# Patient Record
Sex: FEMALE | Race: WHITE | NOT HISPANIC OR LATINO | Employment: OTHER | URBAN - METROPOLITAN AREA
[De-identification: names, ages, dates, MRNs, and addresses within clinical notes are randomized per-mention and may not be internally consistent; named-entity substitution may affect disease eponyms.]

---

## 2017-01-10 ENCOUNTER — OFFICE VISIT (OUTPATIENT)
Dept: OBSTETRICS AND GYNECOLOGY | Age: 42
End: 2017-01-10

## 2017-01-10 VITALS
SYSTOLIC BLOOD PRESSURE: 100 MMHG | HEIGHT: 70 IN | BODY MASS INDEX: 27.2 KG/M2 | WEIGHT: 190 LBS | DIASTOLIC BLOOD PRESSURE: 70 MMHG

## 2017-01-10 DIAGNOSIS — Z01.419 ENCOUNTER FOR GYNECOLOGICAL EXAMINATION WITHOUT ABNORMAL FINDING: Primary | ICD-10-CM

## 2017-01-10 DIAGNOSIS — Z12.4 SCREENING FOR CERVICAL CANCER: ICD-10-CM

## 2017-01-10 DIAGNOSIS — F34.1 DYSTHYMIA: ICD-10-CM

## 2017-01-10 PROCEDURE — 99396 PREV VISIT EST AGE 40-64: CPT | Performed by: OBSTETRICS & GYNECOLOGY

## 2017-01-10 RX ORDER — ESCITALOPRAM OXALATE 10 MG/1
10 TABLET ORAL DAILY
Qty: 30 TABLET | Refills: 2 | Status: SHIPPED | OUTPATIENT
Start: 2017-01-10 | End: 2018-01-10

## 2017-01-10 RX ORDER — SULFAMETHOXAZOLE AND TRIMETHOPRIM 800; 160 MG/1; MG/1
1 TABLET ORAL 2 TIMES DAILY
Qty: 14 TABLET | Refills: 0 | Status: SHIPPED | OUTPATIENT
Start: 2017-01-10 | End: 2017-01-30 | Stop reason: SDUPTHER

## 2017-01-10 NOTE — PROGRESS NOTES
"Routine Annual Visit    1/10/2017    Patient: Eleni Vaughan          MR#:[id number]      Chief Complaint   Patient presents with   • Annual Exam     PT HERE FOR ANNUAL EXAM, MAMMOGRAM TODAY. MOOD A LITTLE \"BUMMED OUT\". LAST PAP 2015 (ASCUS BUT NEG HPV).       History of Present Illness    41 y.o. female No obstetric history on file. who presents for annual exam.   Having problems with depression, apathy and just not herself  Also a skin lesion on trunk worried about  Reg menses , no HF or NS  mammo today  Pap today  Baby is 2  VAS  Works from home  Also some erethematous skin lesions in axilla  Stopped shaving for a few days          Patient's last menstrual period was 12/12/2016 (exact date).  Obstetric History:  OB History     No data available         Menstrual History:     Patient's last menstrual period was 12/12/2016 (exact date).       Sexual History:       ________________________________________  There is no problem list on file for this patient.      No past medical history on file.    No past surgical history on file.    History   Smoking Status   • Not on file   Smokeless Tobacco   • Not on file       has a current medication list which includes the following prescription(s): escitalopram and sulfamethoxazole-trimethoprim.  ________________________________________    Current contraception: vasectomy  History of abnormal Pap smear: yes - ascus, hpv neg last year  Family history of Breast cancer: no  Family history of uterine or ovarian cancer: no  Family History of colon cancer/colon polyps: no  History of abnormal mammogram: no      The following portions of the patient's history were reviewed and updated as appropriate: allergies, current medications, past family history, past medical history, past social history, past surgical history and problem list.    Review of Systems    Pertinent items are noted in HPI.     Objective   Physical Exam    Visit Vitals   • /70   • Ht 70\" (177.8 cm)   • Wt " "190 lb (86.2 kg)   • LMP 12/12/2016 (Exact Date)   • Breastfeeding No   • BMI 27.26 kg/m2      BP Readings from Last 3 Encounters:   01/10/17 100/70      Wt Readings from Last 3 Encounters:   01/10/17 190 lb (86.2 kg)      BMI: Estimated body mass index is 27.26 kg/(m^2) as calculated from the following:    Height as of this encounter: 70\" (177.8 cm).    Weight as of this encounter: 190 lb (86.2 kg).      General:   alert, appears stated age and cooperative   Abdomen: soft, non-tender, without masses or organomegaly, pigmented area of concern on trunk, not raised , non tender, 1 cm or less, dark brown and irreg shape   Breast: inspection negative, no nipple discharge or bleeding, no masses or nodularity palpable, red papules under both arms consistent with a infectious skin lesions   Vulva: normal   Vagina: normal mucosa   Cervix: no cervical motion tenderness and no lesions   Uterus: normal size, mobile or non-tender   Adnexa: normal adnexa and no mass, fullness, tenderness     Assessment:    1. Normal annual exam   Assessment     ICD-10-CM ICD-9-CM   1. Encounter for gynecological examination without abnormal finding Z01.419 V72.31   2. Screening for cervical cancer Z12.4 V76.2   3. Dysthymia F34.1 300.4     Plan:    Plan     [x]  Mammogram request made  [x]  PAP done  []  Labs:   []  GC/Chl/TV  []  DEXA scan   []  Referral for colonoscopy:     lexapro 10  Fu 3 months  Bactrim for lesions in axilla  Referral derm for lesion on trunk  "

## 2017-01-10 NOTE — MR AVS SNAPSHOT
Eleni Vaughan   1/10/2017 9:30 AM   Office Visit    Dept Phone:  [phone]   Encounter #:  [id number]    Provider:  Jaqui Polanco MD   Department:  [facility] OB GYN                Your Full Care Plan              Today's Medication Changes          These changes are accurate as of: 1/10/17 10:46 AM.  If you have any questions, ask your nurse or doctor.               New Medication(s)Ordered:     escitalopram 10 MG tablet   Commonly known as:  LEXAPRO   Take 1 tablet by mouth Daily.   Started by:  Jaqui Polanco MD       sulfamethoxazole-trimethoprim 800-160 MG per tablet   Commonly known as:  BACTRIM DS   Take 1 tablet by mouth 2 (Two) Times a Day for 7 days.   Started by:  Jaqui Polanco MD            Where to Get Your Medications      These medications were sent to [facility] [address] [place] [address] N YULIET GARCIA AT [facility]. & [facility] - [phone] [facility] [phone] [facility] [place] [address]     Phone:  [phone]     escitalopram 10 MG tablet    sulfamethoxazole-trimethoprim 800-160 MG per tablet                  Your Updated Medication List          This list is accurate as of: 1/10/17 10:46 AM.  Always use your most recent med list.                escitalopram 10 MG tablet   Commonly known as:  LEXAPRO   Take 1 tablet by mouth Daily.       sulfamethoxazole-trimethoprim 800-160 MG per tablet   Commonly known as:  BACTRIM DS   Take 1 tablet by mouth 2 (Two) Times a Day for 7 days.               We Performed the Following     PapIG, HPV, Rfx 16 / 18       You Were Diagnosed With        Codes Comments    Encounter for gynecological examination without abnormal finding    -  Primary ICD-10-CM: Z01.419  ICD-9-CM: V72.31     Screening for cervical cancer     ICD-10-CM: Z12.4  ICD-9-CM: V76.2     Dysthymia     ICD-10-CM: F34.1  ICD-9-CM: 300.4       Instructions     None    Patient Instructions History      Upcoming  "Appointments     Visit Type Date Time Department    WELLNESS 1/10/2017  9:30 AM [facility]    MAMMO WIFLREDO 1/10/2017 10:00 AM [facility]    GYN FOLLOW UP 2017  1:45 PM [facility] Isaacwei Signup     [facility] [employer] allows you to send messages to your doctor, view your test results, renew your prescriptions, schedule appointments, and more. To sign up, go to [employer] and click on the Sign Up Now link in the New User? box. Enter your [employer] Activation Code exactly as it appears below along with the last four digits of your Social Security Number and your Date of Birth () to complete the sign-up process. If you do not sign up before the expiration date, you must request a new code.    [employer] Activation Code: [email or web address]  Expires: 2017 10:18 AM    If you have questions, you can email [email or web address] or call [phone] to talk to our [employer] staff. Remember, [employer] is NOT to be used for urgent needs. For medical emergencies, dial 911.               Other Info from Your Visit           Your Appointments     2017  1:45 PM EST   GYN FOLLOW UP with Jaqui Polanco MD   [facility] (--)    [address] [facility] [address]   [phone]              Allergies     Morphine And Related  Nausea And Vomiting      Reason for Visit     Annual Exam PT HERE FOR ANNUAL EXAM, MAMMOGRAM TODAY. MOOD A LITTLE \"BUMMED OUT\". LAST PAP  (ASCUS BUT NEG HPV).      Vital Signs     Blood Pressure Height Weight Last Menstrual Period Breastfeeding? Body Mass Index    100/70 70\" (177.8 cm) 190 lb (86.2 kg) 2016 (Exact Date) No 27.26 kg/m2      Problems and Diagnoses Noted     Encounter for routine gynecological examination    -  Primary    Screening for cervical cancer        Mood problem            "

## 2017-01-17 LAB
CYTOLOGIST CVX/VAG CYTO: NORMAL
CYTOLOGY CVX/VAG DOC THIN PREP: NORMAL
DX ICD CODE: NORMAL
HIV 1 & 2 AB SER-IMP: NORMAL
HPV I/H RISK 1 DNA CVX QL PROBE+SIG AMP: NORMAL
HPV I/H RISK 4 DNA CVX QL PROBE+SIG AMP: NEGATIVE
OTHER STN SPEC: NORMAL
PATH REPORT.FINAL DX SPEC: NORMAL
STAT OF ADQ CVX/VAG CYTO-IMP: NORMAL

## 2017-01-30 ENCOUNTER — OFFICE VISIT (OUTPATIENT)
Dept: OBSTETRICS AND GYNECOLOGY | Age: 42
End: 2017-01-30

## 2017-01-30 VITALS
HEIGHT: 70 IN | BODY MASS INDEX: 27.77 KG/M2 | WEIGHT: 194 LBS | DIASTOLIC BLOOD PRESSURE: 76 MMHG | SYSTOLIC BLOOD PRESSURE: 122 MMHG

## 2017-01-30 DIAGNOSIS — L03.119 CELLULITIS OF AXILLA, UNSPECIFIED LATERALITY: Primary | ICD-10-CM

## 2017-01-30 PROCEDURE — 99213 OFFICE O/P EST LOW 20 MIN: CPT | Performed by: OBSTETRICS & GYNECOLOGY

## 2017-01-30 RX ORDER — SULFAMETHOXAZOLE AND TRIMETHOPRIM 800; 160 MG/1; MG/1
1 TABLET ORAL 2 TIMES DAILY
Qty: 20 TABLET | Refills: 0 | Status: SHIPPED | OUTPATIENT
Start: 2017-01-30 | End: 2017-02-09

## 2017-01-30 NOTE — PROGRESS NOTES
"GYN Visit    2017    Patient: Eleni Vaughan          MR#:[id number]      Chief Complaint   Patient presents with   • Follow-up     FOLLOW UP TO AXILLA NODULES. BETTER BUT STILL ITCHY.       History of Present Illness    41 y.o. female  who presents for follow up axillary nodules  They almost disappeared but now starting to itch again  Started on lexapro but now feeling a little better so stopped  Has appt with Dr Currie next week        Patient's last menstrual period was 2017 (exact date).    ________________________________________  There is no problem list on file for this patient.      Past Medical History   Diagnosis Date   • Breast pain in female    • History of chicken pox    • History of heart murmur in childhood    • History of uterine fibroid    • Threatened miscarriage    • Vaginal delivery        Past Surgical History   Procedure Laterality Date   • Dilatation and curettage     • Knee surgery     • Hernia repair         History   Smoking Status   • Not on file   Smokeless Tobacco   • Not on file       has a current medication list which includes the following prescription(s): escitalopram and sulfamethoxazole-trimethoprim.  ________________________________________    Current contraception: vasectomy      The following portions of the patient's history were reviewed and updated as appropriate: allergies, current medications, past family history, past medical history, past social history, past surgical history and problem list.    Review of Systems    Pertinent items are noted in HPI.     Objective   Physical Exam    Visit Vitals   • /76   • Ht 70\" (177.8 cm)   • Wt 194 lb (88 kg)   • LMP 2017 (Exact Date)   • BMI 27.84 kg/m2      BP Readings from Last 3 Encounters:   17 122/76   01/10/17 100/70      Wt Readings from Last 3 Encounters:   17 194 lb (88 kg)   01/10/17 190 lb (86.2 kg)      BMI: Estimated body mass index is 27.84 kg/(m^2) as calculated from the " "following:    Height as of this encounter: 70\" (177.8 cm).    Weight as of this encounter: 194 lb (88 kg).      General:   alert, appears stated age and cooperative   Abdomen: soft, non-tender, without masses or organomegaly   Breast: inspection negative, no nipple discharge or bleeding, no masses or nodularity palpable and pink splotches bilateral axilla, not raised or tender, much improved appearence   Vulva:    Vagina:    Cervix:    Uterus:    Adnexa:      Assessment:    Assessment       ICD-10-CM ICD-9-CM   1. Cellulitis of axilla, unspecified laterality L03.119 682.3     Plan:      Improvement with antibiotic and now recurring  Will repeat course with longer duration  Also get derm opinion at her appt next week  Follow up 1 month    "

## 2017-01-30 NOTE — MR AVS SNAPSHOT
Eleni Vaughan   2017 1:45 PM   Office Visit    Dept Phone:  [phone]   Encounter #:  [id number]    Provider:  Jaqui Polanco MD   Department:  [facility]                Your Full Care Plan              Where to Get Your Medications      These medications were sent to [address] - [address] [place] [address]. - [phone]  - [phone] [place] [address]     Phone:  [phone]     sulfamethoxazole-trimethoprim 800-160 MG per tablet            Your Updated Medication List          This list is accurate as of: 17  1:51 PM.  Always use your most recent med list.                escitalopram 10 MG tablet   Commonly known as:  LEXAPRO   Take 1 tablet by mouth Daily.       sulfamethoxazole-trimethoprim 800-160 MG per tablet   Commonly known as:  BACTRIM DS   Take 1 tablet by mouth 2 (Two) Times a Day for 10 days.               You Were Diagnosed With        Codes Comments    Cellulitis of axilla, unspecified laterality    -  Primary ICD-10-CM: L03.119  ICD-9-CM: 682.3       Instructions     None    Patient Instructions History      Upcoming Appointments     Visit Type Date Time Department    GYN FOLLOW UP 2017  1:45 PM [facility]    OB FOLLOWUP 3/2/2017  9:45 AM [facility] Signup     [facility] [employer] allows you to send messages to your doctor, view your test results, renew your prescriptions, schedule appointments, and more. To sign up, go to [employer] and click on the Sign Up Now link in the New User? box. Enter your [employer] Activation Code exactly as it appears below along with the last four digits of your Social Security Number and your Date of Birth () to complete the sign-up process. If you do not sign up before the expiration date, you must request a new code.    [employer] Activation Code:  [email or web address]  Expires: 2/13/2017  1:51 PM    If you have questions, you can email [email or web address] or call [phone] to talk to our MyChart staff. Remember, MyChart is NOT to be used for urgent needs. For medical emergencies, dial 911.               Other Info from Your Visit           Your Appointments     Mar 02, 2017  9:45 AM EST   OB FOLLOWUP with Jaqui Polanco MD   [facility] (--)    [address] [place] [id number] [phone]              Allergies     Morphine And Related  Nausea And Vomiting      Reason for Visit     Follow-up FOLLOW UP TO AXILLA NODULES. BETTER BUT STILL ITCHY.      Vital Signs     Blood Pressure Height Weight Last Menstrual Period Body Mass Index       122/76 70\" (177.8 cm) 194 lb (88 kg) 01/16/2017 (Exact Date) 27.84 kg/m2       Problems and Diagnoses Noted     Cellulitis of axilla    -  Primary        "

## 2017-02-02 ENCOUNTER — TELEPHONE (OUTPATIENT)
Dept: OBSTETRICS AND GYNECOLOGY | Age: 42
End: 2017-02-02

## 2017-02-02 RX ORDER — CLINDAMYCIN HYDROCHLORIDE 150 MG/1
150 CAPSULE ORAL 4 TIMES DAILY
Qty: 28 CAPSULE | Refills: 0 | Status: SHIPPED | OUTPATIENT
Start: 2017-02-02 | End: 2017-02-09

## 2018-04-09 ENCOUNTER — OFFICE VISIT (OUTPATIENT)
Dept: SPORTS MEDICINE | Facility: CLINIC | Age: 43
End: 2018-04-09

## 2018-04-09 VITALS
DIASTOLIC BLOOD PRESSURE: 72 MMHG | BODY MASS INDEX: 26.9 KG/M2 | HEIGHT: 72 IN | WEIGHT: 198.6 LBS | SYSTOLIC BLOOD PRESSURE: 124 MMHG

## 2018-04-09 DIAGNOSIS — M25.462 EFFUSION OF LEFT KNEE: ICD-10-CM

## 2018-04-09 DIAGNOSIS — M25.562 ACUTE PAIN OF LEFT KNEE: Primary | ICD-10-CM

## 2018-04-09 DIAGNOSIS — Z87.828 HISTORY OF TEAR OF ACL (ANTERIOR CRUCIATE LIGAMENT): ICD-10-CM

## 2018-04-09 PROCEDURE — 99204 OFFICE O/P NEW MOD 45 MIN: CPT | Performed by: FAMILY MEDICINE

## 2018-04-09 PROCEDURE — 20611 DRAIN/INJ JOINT/BURSA W/US: CPT | Performed by: FAMILY MEDICINE

## 2018-04-09 PROCEDURE — 73562 X-RAY EXAM OF KNEE 3: CPT | Performed by: FAMILY MEDICINE

## 2018-04-09 RX ORDER — TRIAMCINOLONE ACETONIDE 40 MG/ML
80 INJECTION, SUSPENSION INTRA-ARTICULAR; INTRAMUSCULAR ONCE
Status: COMPLETED | OUTPATIENT
Start: 2018-04-09 | End: 2018-04-09

## 2018-04-09 RX ADMIN — TRIAMCINOLONE ACETONIDE 80 MG: 40 INJECTION, SUSPENSION INTRA-ARTICULAR; INTRAMUSCULAR at 17:06

## 2018-04-09 NOTE — PROGRESS NOTES
"Eleni is a 42 y.o. year old female    Chief Complaint   Patient presents with   • Left Knee - Edema       History of Present Illness  HPI     Knee pain: Gradual onset 1 month ago with no known trauma.  History of ACL reconstruction using bone patellar tendon bone in 1992.  From patient report, no meniscal injury or any other injury.  She was playing volleyball in high school of the time and continue to wear brace for the next 2 years.  Has otherwise not had any problems with the knee until one month ago.  Has been limited with her exercise which is high intensity, especially with maneuvers where she goes from standing down to the floor and out.  Pain on outside of the knee is worsened when going from seated to standing position.  Has associated swelling over the past week.  Has iced the knee.    I have reviewed the patient's medical, family, and social history in detail and updated the computerized patient record.    Review of Systems   Constitutional: Negative for fever.   Musculoskeletal:        Per HPI   Skin: Negative for rash.   Neurological: Negative for weakness and numbness.   Psychiatric/Behavioral: Negative for sleep disturbance.   All other systems reviewed and are negative.      /72   Ht 182.9 cm (72\")   Wt 90.1 kg (198 lb 9.6 oz)   BMI 26.94 kg/m²      Physical Exam    Vital signs reviewed.   General: No acute distress.  Eyes: conjunctiva clear; pupils equally round and reactive  ENT: external ears and nose atraumatic; oropharynx clear  CV: no peripheral edema, 2+ distal pulses  Resp: normal respiratory effort, no use of accessory muscles  Skin: no rashes or wounds; normal turgor  Psych: mood and affect appropriate; recent and remote memory intact  Neuro: sensation to light touch intact    MSK Exam:  Ortho Exam    L knee: No tenderness, crepitus or warmth; grade 2 joint effusion; full range of motion; negative Lachman's; negative medial and lateral Paco's; no varus or valgus laxity; - " "ant drawer  R knee: No tenderness, crepitus or warmth; full range of motion; negative Lachman's; negative medial and lateral Paco's; no varus or valgus laxity    Left Knee X-Ray  Indication: Pain    Views: AP, Lateral, and [place]    Findings:  No fracture  No bony lesion  Normal soft tissues  Normal joint spaces  Surgical anchor from prior ACL intact  Lucency on proximal tibia from prior surgery    No prior studies were available for comparison.    Knee Aspiration/Injection Procedure Note    Left knee aspiration/injection was discussed with the patient in detail, including indication, risks, benefits, and alternatives. Verbal consent was given for the procedure. Injection was performed by physician. Ultrasound was used throughout procedure for injection accuracy.  Aspiration/injection site at the superior lateral capsule recess was identified by physical examination then cleaned with Betadine and alcohol swabs.  Sterile technique was used. Local anesthesia was obtained with approximately 3 mL of 1% lidocaine with epinephrine.   An 18-gauge, 1.5\" needle was used to aspirate approximately 31 mL of serosanguenous fluid.   The needle was left in place and syringe was changed for injection. Injectate was passed into the joint space without difficulty. The needle was removed and a simple bandage was applied. The procedure was tolerated well without difficulty.    Injection mixture:  1% lidocaine without epinephrine: 1 mL  40 mg/mL triamcinolone acetonide: 2 mL    Diagnoses and all orders for this visit:    Acute pain of left knee  -     XR Knee 3+ View With Sunrise Left  -     triamcinolone acetonide (KENALOG-40) injection 80 mg; Inject 2 mL into the joint 1 (One) Time.    History of tear of ACL (anterior cruciate ligament)    Effusion of left knee  -     triamcinolone acetonide (KENALOG-40) injection 80 mg; Inject 2 mL into the joint 1 (One) Time.      Discussed diff, possible Dx of effusion. Asp/inj as above. " Post-procedural instructions given. Sample of Pennsaid given. F/up PRN.    EMR Dragon/Transcription disclaimer:    Much of this encounter note is an electronic transcription/translation of spoken language to printed text.  The electronic translation of spoken language may permit erroneous, or at times, nonsensical words or phrases to be inadvertently transcribed.  Although I have reviewed the note for such errors some may still exist.

## 2018-04-26 ENCOUNTER — TELEPHONE (OUTPATIENT)
Dept: OBSTETRICS AND GYNECOLOGY | Age: 43
End: 2018-04-26

## 2018-04-26 NOTE — TELEPHONE ENCOUNTER
Let her know she can try menstrual cups to see if they stay in and are comfortable and useable.  We can schedule an exam to characterize her prolapse if she desires or talk about it at AE next year. On last AE I did not note any severe prolapse so likely mild to moderate as is usual with history of vaginal birth

## 2018-04-26 NOTE — TELEPHONE ENCOUNTER
Patient wants to know to what degree her uterus is prolapsed as well as if mentsral cups are an option and if so, any recommendations. Please advise.

## 2018-04-26 NOTE — TELEPHONE ENCOUNTER
s/w patient, advised her of Dr. Polanco' response. Patient states she is going to continue to research them and she will discuss with Dr. Polanco at upcoming AE 5/25/18. I advised patient to call back with any other questions or concerns.

## 2018-05-09 ENCOUNTER — TELEPHONE (OUTPATIENT)
Dept: SPORTS MEDICINE | Facility: CLINIC | Age: 43
End: 2018-05-09

## 2018-05-09 DIAGNOSIS — Z87.828 HISTORY OF TEAR OF ACL (ANTERIOR CRUCIATE LIGAMENT): ICD-10-CM

## 2018-05-09 DIAGNOSIS — M25.562 ACUTE PAIN OF LEFT KNEE: Primary | ICD-10-CM

## 2018-05-17 DIAGNOSIS — Z87.828 HISTORY OF TEAR OF ACL (ANTERIOR CRUCIATE LIGAMENT): ICD-10-CM

## 2018-05-17 DIAGNOSIS — M25.562 ACUTE PAIN OF LEFT KNEE: ICD-10-CM

## 2018-05-21 ENCOUNTER — OFFICE VISIT (OUTPATIENT)
Dept: SPORTS MEDICINE | Facility: CLINIC | Age: 43
End: 2018-05-21

## 2018-05-21 VITALS
BODY MASS INDEX: 26.03 KG/M2 | HEIGHT: 72 IN | DIASTOLIC BLOOD PRESSURE: 64 MMHG | SYSTOLIC BLOOD PRESSURE: 122 MMHG | WEIGHT: 192.2 LBS

## 2018-05-21 DIAGNOSIS — M25.462 EFFUSION OF LEFT KNEE: ICD-10-CM

## 2018-05-21 DIAGNOSIS — M25.562 ACUTE PAIN OF LEFT KNEE: Primary | ICD-10-CM

## 2018-05-21 DIAGNOSIS — Z87.828 HISTORY OF TEAR OF ACL (ANTERIOR CRUCIATE LIGAMENT): ICD-10-CM

## 2018-05-21 PROCEDURE — 99214 OFFICE O/P EST MOD 30 MIN: CPT | Performed by: FAMILY MEDICINE

## 2018-05-21 NOTE — PROGRESS NOTES
"Eleni is a 42 y.o. year old female    Chief Complaint   Patient presents with   • Results     MRI       History of Present Illness  HPI     L knee pain: Persists.  She had some relief of pain following cortisone injection but she still having mechanical symptoms and instability.  She has continued to try activity modification but is very limited in doing so.  Has been taking ibuprofen.  Here to discuss MRI results.    I have reviewed the patient's medical, family, and social history in detail and updated the computerized patient record.    Review of Systems   Constitutional: Negative for fever.   Musculoskeletal:        Per HPI   Skin: Negative for rash.   Neurological: Negative for weakness and numbness.   Psychiatric/Behavioral: Negative for sleep disturbance.   All other systems reviewed and are negative.      /64   Ht 182.9 cm (72\")   Wt 87.2 kg (192 lb 3.2 oz)   BMI 26.07 kg/m²      Physical Exam    Vital signs reviewed.   General: No acute distress.  Eyes: conjunctiva clear; pupils equally round and reactive  ENT: external ears and nose atraumatic; oropharynx clear  CV: no peripheral edema, 2+ distal pulses  Resp: normal respiratory effort, no use of accessory muscles  Skin: no rashes or wounds; normal turgor  Psych: mood and affect appropriate; recent and remote memory intact  Neuro: sensation to light touch intact    MSK Exam:  Ortho Exam    L knee: no warmth.  Grade 1 joint effusion.  Medial joint line tenderness.  No crepitus.  Positive Lachman's.  Positive anterior drawer.  Negative medial and lateral Paco's. No varus or valgus laxity.   R knee: no joint effusion, warmth or tenderness. No crepitus. Negative Lachman's. Negative medial and lateral Paco's. No varus or valgus laxity.     MRI results for left knee without contrast were reviewed with patient and scanned into chart.  In summation, questionable deficiency of the proximal ACL graft.  Small joint effusion.  " Chondromalacia.    Diagnoses and all orders for this visit:    Acute pain of left knee  -     Ambulatory Referral to Orthopedic Surgery    History of tear of ACL (anterior cruciate ligament)  -     Ambulatory Referral to Orthopedic Surgery    Effusion of left knee  -     Ambulatory Referral to Orthopedic Surgery      Discussed with Eleni that clinically it does appear that she is having failure of her ACL graft.  This does correlate with her MRI findings.  I recommend consultation with Dr. Cooper for further evaluation and management.    EMR Dragon/Transcription disclaimer:    Much of this encounter note is an electronic transcription/translation of spoken language to printed text.  The electronic translation of spoken language may permit erroneous, or at times, nonsensical words or phrases to be inadvertently transcribed.  Although I have reviewed the note for such errors some may still exist.

## 2018-05-29 ENCOUNTER — OFFICE VISIT (OUTPATIENT)
Dept: ORTHOPEDIC SURGERY | Facility: CLINIC | Age: 43
End: 2018-05-29

## 2018-05-29 VITALS — HEIGHT: 72 IN | BODY MASS INDEX: 26.01 KG/M2 | WEIGHT: 192 LBS

## 2018-05-29 DIAGNOSIS — T84.89XA TEAR OF ANTERIOR CRUCIATE LIGAMENT GRAFT, INITIAL ENCOUNTER (HCC): Primary | ICD-10-CM

## 2018-05-29 PROCEDURE — 99204 OFFICE O/P NEW MOD 45 MIN: CPT | Performed by: ORTHOPAEDIC SURGERY

## 2018-05-29 NOTE — PROGRESS NOTES
Subjective:     Patient ID: Eleni Vaughan is a 42 y.o. female.    Chief Complaint:  Left knee instability and pain  History of Present Illness  Eleni Vaughan presents to clinic today for evaluation of left knee instability and pain is been present for approximately the last 2 months, denies any specific injury prior to the onset of the symptoms.  She does have prior history of left knee ACL reconstruction with bone patella tendon bone autograft back in 1992, she subsequently played basketball and did well with minimal to no issues until recently.  Localizes pain that she has experienced in the tibia anterior and lateral aspect of the knee, rates as a 2 out of 10, aching in nature, major issue for her though as her instability and feeling that she cannot trust her knee at this point in time.  Moderate intermittent swelling does wax and wane.  Denies radiation of pain from knee, denies associated numbness or tingling.  No fevers chills or sweats, no issues with her incisions, no issues with prior skin infection.  Denies any left hip or groin pain.     Social History     Occupational History   • Not on file.     Social History Main Topics   • Smoking status: Never Smoker   • Smokeless tobacco: Not on file   • Alcohol use Yes   • Drug use: Unknown   • Sexual activity: Not on file      Past Medical History:   Diagnosis Date   • Breast pain in female    • History of chicken pox    • History of heart murmur in childhood    • History of uterine fibroid    • Threatened miscarriage    • Vaginal delivery      Past Surgical History:   Procedure Laterality Date   • DILATATION AND CURETTAGE     • HERNIA REPAIR  1989   • KNEE SURGERY Left 1992    ACL recon BTB       Family History   Problem Relation Age of Onset   • No Known Problems Mother    • No Known Problems Father    • No Known Problems Sister    • No Known Problems Brother    • No Known Problems Daughter    • No Known Problems Son    • Cancer Maternal Grandmother    •  "No Known Problems Paternal Grandmother    • No Known Problems Maternal Aunt    • No Known Problems Paternal Aunt    • BRCA 1/2 Neg Hx    • Breast cancer Neg Hx    • Colon cancer Neg Hx    • Endometrial cancer Neg Hx    • Ovarian cancer Neg Hx          Review of Systems   Constitutional: Negative for chills, diaphoresis, fever and unexpected weight change.   HENT: Negative for hearing loss, nosebleeds, sore throat and tinnitus.    Eyes: Negative for pain and visual disturbance.   Respiratory: Negative for cough, shortness of breath and wheezing.    Cardiovascular: Negative for chest pain and palpitations.   Gastrointestinal: Negative for abdominal pain, diarrhea, nausea and vomiting.   Endocrine: Negative for cold intolerance, heat intolerance and polydipsia.   Genitourinary: Negative for difficulty urinating, dysuria and hematuria.   Musculoskeletal: Positive for arthralgias. Negative for joint swelling and myalgias.   Skin: Negative for rash and wound.   Allergic/Immunologic: Negative for environmental allergies.   Neurological: Negative for dizziness, syncope and numbness.   Hematological: Does not bruise/bleed easily.   Psychiatric/Behavioral: Negative for dysphoric mood and sleep disturbance. The patient is not nervous/anxious.            Objective:  Vitals:    05/29/18 1045   Weight: 87.1 kg (192 lb)   Height: 182.9 cm (72\")     1    05/29/18  1045   Weight: 87.1 kg (192 lb)     Body mass index is 26.04 kg/m².  Physical Exam    Vital signs reviewed.   General: No acute distress.  Eyes: conjunctiva clear; pupils equally round and reactive  ENT: external ears and nose atraumatic; oropharynx clear  CV: no peripheral edema  Resp: normal respiratory effort  Skin: no rashes or wounds; normal turgor  Psych: mood and affect appropriate; recent and remote memory intact       Ortho Exam    Left knee-prior incisions well-healed, active range of motion 2-140° 4+ out of 5 strength.  Grade 2B Lachman, 2+ anterior drawer with " soft endpoint, negative posterior drawer, negative posterior lateral drawer, negative dial test, positive pivot shift test.  Stable to varus and valgus stress at 2 and 30°.  Moderate effusion noted.  No joint line pain, negative Paco exam, moderately positive active patellar compression test.  Negative patellar apprehension test, midline patellar tracking.  Positive sensation light touch all discretions left foot symmetric to the right, brisk cap refill all digits, 2+ dorsalis is pulse left foot.    Imaging:  Review of outside x-rays left knee as well as radiology report indicate prior graft as well as retained hardware from ACL reconstruction with no significant joint space narrowing, no evidence of fracture, dislocation, or subluxation.    Review of outside MRI from [facility] and open MRI left knee, including review of images and MRI report indicates disruption of the proximal end of prior ACL graft with ossific density in the intercondylar notch noted.  No evidence of medial lateral meniscal tear, moderate chondral malacia the patellofemoral joint appreciated.    Assessment:       1. Tear of anterior cruciate ligament graft, initial encounter          Plan:          Discussed treatment options at length with patient at today's visit. I discussed at length treatment options with patient today including not limited to physical therapy, bracing, anti-inflammatory medications, home exercise program, and revision reconstruction of left knee anterior cruciate ligament.  Patient is still very active this point time with running, jumping, and cutting activities, she feels unstable on her knee, she has failed home exercises and she is very active this point time.  That she would like to proceed with surgical treatment.  Her preference is for use of hamstring autograft but I did discuss with her that given her prior incision she may have had disruption of Her Hamstring Insertions at Time of Her Prior Surgery..  Thus We  Will Have a Bone Patellar Tendon Bone Allograft Available at Time of Surgery.    Plan will be for left knee ACL revision reconstruction with hamstring autograft versus bone patellar tendon bone allograft, hardware removal, meniscal cartilage surgery as indicated. I reviewed details of procedure including pertinent anatomy with the patient as well as risks benefits and alternatives, with the risks including not limited to neurovascular damage, bleeding, infection, re-tear of graft, failure of healing of graft, loss of motion, weakness, stiffness, instability, chondrolysis, DVT, pulmonary embolus, death, stroke, complex regional pain syndrome, myocardial infarction, need for additional procedures.  Patient understood all these had all questions answered before verbally consenting to proceed with surgery.  No guarantees were given regarding results of procedure.    Patient denies any history of DVT or pulmonary embolus, significant cardiac history.      Eleni Vaughan was in agreement with plan and had all questions answered.     Orders:  Orders Placed This Encounter   Procedures   • Basic metabolic panel   • Protime-INR   • APTT   • Follow Anesthesia Guidelines / Standing Orders   • CBC and Differential       Medications:  No orders of the defined types were placed in this encounter.      Followup:  No Follow-up on file.    Eleni was seen today for pain.    Diagnoses and all orders for this visit:    Tear of anterior cruciate ligament graft, initial encounter  -     Case Request; Standing  -     acetaminophen (TYLENOL) tablet 975 mg; Take 3 tablets by mouth 1 (One) Time.  -     meloxicam (MOBIC) tablet 7.5 mg; Take 1 tablet by mouth 1 (One) Time.  -     oxyCODONE (oxyCONTIN) 12 hr tablet 10 mg; Take 1 tablet by mouth 1 (One) Time.  -     CBC and Differential; Future  -     Basic metabolic panel; Future  -     Protime-INR; Future  -     APTT; Future  -     pregabalin (LYRICA) capsule 150 mg; Take 1 capsule by  mouth 1 (One) Time.  -     Case Request    Other orders  -     Follow Anesthesia Guidelines / Standing Orders; Future  -     Follow Anesthesia Guidelines / Standing Orders; Standing  -     Verify NPO Status; Standing  -     SCD (sequential compression device)- to be placed on patient in Pre-op; Standing  -     Clip operative site; Standing  -     Obtain informed consent (if not collected inpatient or PAT); Standing          Dictated utilizing Dragon dictation

## 2018-05-30 ENCOUNTER — TELEPHONE (OUTPATIENT)
Dept: ORTHOPEDIC SURGERY | Facility: CLINIC | Age: 43
End: 2018-05-30

## 2018-05-30 RX ORDER — PREGABALIN 150 MG/1
150 CAPSULE ORAL ONCE
Status: CANCELLED | OUTPATIENT
Start: 2018-05-30 | End: 2018-05-30

## 2018-05-30 RX ORDER — OXYCODONE HCL 10 MG/1
10 TABLET, FILM COATED, EXTENDED RELEASE ORAL ONCE
Status: CANCELLED | OUTPATIENT
Start: 2018-05-30 | End: 2018-05-30

## 2018-05-30 RX ORDER — ACETAMINOPHEN 325 MG/1
1000 TABLET ORAL ONCE
Status: CANCELLED | OUTPATIENT
Start: 2018-05-30 | End: 2018-05-30

## 2018-05-30 RX ORDER — MELOXICAM 7.5 MG/1
7.5 TABLET ORAL ONCE
Status: CANCELLED | OUTPATIENT
Start: 2018-05-30 | End: 2018-05-30

## 2018-05-30 NOTE — TELEPHONE ENCOUNTER
Patient called and is ready to schedule surgery for L knee. Need orders please    Also she would like to attend a conference in [place] around June 21st. Patient would be driving there. Would there be a problem with her having surgery prior to this, or should she wait and have surgery after. She was unsure of what driving/walking restrictions you might have on her at that time.

## 2018-05-31 PROBLEM — T84.89XA TEAR OF ANTERIOR CRUCIATE LIGAMENT GRAFT (HCC): Status: ACTIVE | Noted: 2018-05-31

## 2018-06-01 ENCOUNTER — APPOINTMENT (OUTPATIENT)
Dept: PREADMISSION TESTING | Facility: HOSPITAL | Age: 43
End: 2018-06-01

## 2018-06-01 VITALS
HEIGHT: 72 IN | SYSTOLIC BLOOD PRESSURE: 104 MMHG | OXYGEN SATURATION: 98 % | RESPIRATION RATE: 16 BRPM | HEART RATE: 62 BPM | DIASTOLIC BLOOD PRESSURE: 70 MMHG | BODY MASS INDEX: 25.84 KG/M2 | WEIGHT: 190.8 LBS

## 2018-06-01 DIAGNOSIS — T84.89XA TEAR OF ANTERIOR CRUCIATE LIGAMENT GRAFT, INITIAL ENCOUNTER (HCC): ICD-10-CM

## 2018-06-01 LAB
ANION GAP SERPL CALCULATED.3IONS-SCNC: 9.5 MMOL/L
APTT PPP: 28.2 SECONDS (ref 24.3–38.1)
BASOPHILS # BLD AUTO: 0.03 10*3/MM3 (ref 0–0.2)
BASOPHILS NFR BLD AUTO: 0.6 % (ref 0–2)
BUN BLD-MCNC: 12 MG/DL (ref 6–20)
BUN/CREAT SERPL: 17.1 (ref 7–25)
CALCIUM SPEC-SCNC: 9.4 MG/DL (ref 8.6–10.5)
CHLORIDE SERPL-SCNC: 103 MMOL/L (ref 98–107)
CO2 SERPL-SCNC: 27.5 MMOL/L (ref 22–29)
CREAT BLD-MCNC: 0.7 MG/DL (ref 0.57–1)
DEPRECATED RDW RBC AUTO: 40.6 FL (ref 37–54)
EOSINOPHIL # BLD AUTO: 0.06 10*3/MM3 (ref 0.1–0.3)
EOSINOPHIL NFR BLD AUTO: 1.3 % (ref 0–4)
ERYTHROCYTE [DISTWIDTH] IN BLOOD BY AUTOMATED COUNT: 11.9 % (ref 11.5–14.5)
GFR SERPL CREATININE-BSD FRML MDRD: 91 ML/MIN/1.73
GLUCOSE BLD-MCNC: 88 MG/DL (ref 65–99)
HCG SERPL QL: NEGATIVE
HCT VFR BLD AUTO: 41.9 % (ref 37–47)
HGB BLD-MCNC: 14.3 G/DL (ref 12–16)
IMM GRANULOCYTES # BLD: 0 10*3/MM3 (ref 0–0.03)
IMM GRANULOCYTES NFR BLD: 0 % (ref 0–0.5)
INR PPP: 1 (ref 0.9–1.1)
LYMPHOCYTES # BLD AUTO: 1.55 10*3/MM3 (ref 0.6–4.8)
LYMPHOCYTES NFR BLD AUTO: 33 % (ref 20–45)
MCH RBC QN AUTO: 31.8 PG (ref 27–31)
MCHC RBC AUTO-ENTMCNC: 34.1 G/DL (ref 31–37)
MCV RBC AUTO: 93.3 FL (ref 81–99)
MONOCYTES # BLD AUTO: 0.32 10*3/MM3 (ref 0–1)
MONOCYTES NFR BLD AUTO: 6.8 % (ref 3–8)
NEUTROPHILS # BLD AUTO: 2.73 10*3/MM3 (ref 1.5–8.3)
NEUTROPHILS NFR BLD AUTO: 58.3 % (ref 45–70)
NRBC BLD MANUAL-RTO: 0 /100 WBC (ref 0–0)
PLATELET # BLD AUTO: 203 10*3/MM3 (ref 140–500)
PMV BLD AUTO: 9.6 FL (ref 7.4–10.4)
POTASSIUM BLD-SCNC: 4 MMOL/L (ref 3.5–5.2)
PROTHROMBIN TIME: 13.2 SECONDS (ref 12.1–15)
RBC # BLD AUTO: 4.49 10*6/MM3 (ref 4.2–5.4)
SODIUM BLD-SCNC: 140 MMOL/L (ref 136–145)
WBC NRBC COR # BLD: 4.69 10*3/MM3 (ref 4.8–10.8)

## 2018-06-01 PROCEDURE — 84703 CHORIONIC GONADOTROPIN ASSAY: CPT | Performed by: ORTHOPAEDIC SURGERY

## 2018-06-01 PROCEDURE — 85025 COMPLETE CBC W/AUTO DIFF WBC: CPT | Performed by: ORTHOPAEDIC SURGERY

## 2018-06-01 PROCEDURE — 85610 PROTHROMBIN TIME: CPT | Performed by: ORTHOPAEDIC SURGERY

## 2018-06-01 PROCEDURE — 36415 COLL VENOUS BLD VENIPUNCTURE: CPT

## 2018-06-01 PROCEDURE — 80048 BASIC METABOLIC PNL TOTAL CA: CPT | Performed by: ORTHOPAEDIC SURGERY

## 2018-06-01 PROCEDURE — 85730 THROMBOPLASTIN TIME PARTIAL: CPT | Performed by: ORTHOPAEDIC SURGERY

## 2018-06-01 NOTE — DISCHARGE INSTRUCTIONS
PRE-ADMISSION TESTING INSTRUCTIONS FOR ADULTS    Take these medications the morning of surgery with a small sip of water:      No aspirin, advil, aleve, ibuprofen, naproxen, diet pills, decongestants, or herbal/vitamins for a week prior to surgery.    General Instructions:    • Do not eat solid food after midnight the night before surgery.  No gum, mints, or hard candy after midnight the night before surgery.  • You may drink clear liquids the day of surgery up until 2 hours before your arrival time.  • Clear liquids are liquids you can see through. Nothing RED in color.    Plain water    Sports drinks  Sodas     Gelatin (Jell-O)  Fruit juices without pulp such as white grape juice and apple juice  Popsicles that contain no fruit or yogurt  Tea or coffee (no cream or milk added)    • It is beneficial for you to have a clear drink that contains carbohydrates just before you leave your house and before your fasting time begins.  We suggest a 20 ounce bottle of Gatorade or Powerade for non-diabetic patients or a 20 ounce bottle of G2 or Powerade Zero for diabetic patients.     • Patients who avoid smoking, chewing tobacco and alcohol for 4 weeks prior to surgery have a reduced risk of post-operative complications.  If at all possible, quit smoking as many days before surgery as you can.    • Do not smoke, use chewing tobacco or drink alcohol the day of surgery    • Bring your C-PAP/ BI-PAP machine if you use one.  • Wear clean comfortable clothes and socks.  • Do not wear contact lenses, lotion, deodorant, or make-up.  Bring a case for your glasses if applicable. You may brush your teeth the morning of surgery.  • You may wear dentures/partials, do not put adhesive/glue on them.  • Bring crutches or walker if applicable.  • Leave all other jewelry and valuables at home.      Preventing a Surgical Site Infection:    • Shower the night before and on the morning of surgery using the chlorhexidine soap you were given.  Use  a clean washcloth with the soap.  Place clean sheets on your bed after showering the night before surgery. Do not use the CHG soap on your hair, face, or private areas. Wash your body gently for five (5) minutes. Do not scrub your skin.  Dry with a clean towel and dress in clean clothing.    • Do not shave the surgical area for 10 days-2 weeks prior to surgery  because the razor can irritate skin and make it easier to develop an infection.  • Make sure you, your family, and all healthcare providers clean their hands with soap and water or an alcohol based hand  before caring for you or your wound.      Day of surgery:    Your surgeon’s office will advise you of your arrival time for the day of surgery.    Upon arrival, a Pre-op nurse and Anesthesia provider will review your health history, obtain vital signs, and answer questions you may have.  The only belongings needed at this time will be your home medications and if applicable your C-PAP/BI-PAP machine.  If you are staying overnight your family can leave the rest of your belongings in the car and bring them to your room later.  A Pre-op nurse will start an IV and you may receive medication in preparation for surgery, including something to help you relax.  Your family will be able to see you in the Pre-op area.  While you are in surgery your family should notify the waiting room  if they leave the waiting room area and provide a contact phone number.    IF you have any questions, you can call the Pre-Admission Department at [phone] or your surgeon's office.  Notify your surgeon if  you become sick, have a fever, productive cough, or cannot be here the day of surgery    Please be aware that surgery does come with discomfort.  We want to make every effort to control your discomfort so please discuss any uncontrolled symptoms with your nurse.   Your doctor will most likely have prescribed pain medications.      If you are going home  after surgery, you will receive individualized written care instructions before being discharged.  A responsible adult (over the age of 18) must drive you to and from the hospital on the day of your surgery and stay with you for 24 hours after anesthesia.    If you are staying overnight following surgery, you will be transported to your hospital room following the recovery period.  [facility] has all private rooms.    Deductibles and co-payments are collected on the day of service. Please be prepared to pay the required co-pay, deductible or deposit on the day of service as defined by your plan.

## 2018-06-03 NOTE — H&P
Orthopedic H&P    Patient ID: Eleni Vaughan is a 42 y.o. female.     Chief Complaint:  Left knee instability and pain    History of Present Illness  Eleni Vaughan presents for surgical treatment of left knee instability and pain has been present for approximately the last 2 months, denies any specific injury prior to the onset of the symptoms.  She does have prior history of left knee ACL reconstruction with bone patella tendon bone autograft back in 1992, she subsequently played basketball and did well with minimal to no issues until recently.  Localizes pain that she has experienced in the tibia anterior and lateral aspect of the knee, rates as a 2 out of 10, aching in nature, major issue for her though as her instability and feeling that she cannot trust her knee at this point in time.  Moderate intermittent swelling does wax and wane.  Denies radiation of pain from knee, denies associated numbness or tingling.  No fevers chills or sweats, no issues with her incisions, no issues with prior skin infection.  Denies any left hip or groin pain.     No current facility-administered medications on file prior to encounter.      No current outpatient prescriptions on file prior to encounter.     Allergies   Allergen Reactions   • Morphine And Related Nausea And Vomiting   • Sulfa Antibiotics Hives       Social History          Occupational History   • Not on file.           Social History Main Topics   • Smoking status: Never Smoker   • Smokeless tobacco: Not on file   • Alcohol use Yes   • Drug use: Unknown   • Sexual activity: Not on file      Medical History        Past Medical History:   Diagnosis Date   • Breast pain in female     • History of chicken pox     • History of heart murmur in childhood     • History of uterine fibroid     • Threatened miscarriage     • Vaginal delivery           Surgical History         Past Surgical History:   Procedure Laterality Date   • DILATATION AND CURETTAGE       •  "HERNIA REPAIR   1989   • KNEE SURGERY Left 1992     ACL recon BTB                  Family History   Problem Relation Age of Onset   • No Known Problems Mother     • No Known Problems Father     • No Known Problems Sister     • No Known Problems Brother     • No Known Problems Daughter     • No Known Problems Son     • Cancer Maternal Grandmother     • No Known Problems Paternal Grandmother     • No Known Problems Maternal Aunt     • No Known Problems Paternal Aunt     • BRCA 1/2 Neg Hx     • Breast cancer Neg Hx     • Colon cancer Neg Hx     • Endometrial cancer Neg Hx     • Ovarian cancer Neg Hx              Review of Systems   Constitutional: Negative for chills, diaphoresis, fever and unexpected weight change.   HENT: Negative for hearing loss, nosebleeds, sore throat and tinnitus.    Eyes: Negative for pain and visual disturbance.   Respiratory: Negative for cough, shortness of breath and wheezing.    Cardiovascular: Negative for chest pain and palpitations.   Gastrointestinal: Negative for abdominal pain, diarrhea, nausea and vomiting.   Endocrine: Negative for cold intolerance, heat intolerance and polydipsia.   Genitourinary: Negative for difficulty urinating, dysuria and hematuria.   Musculoskeletal: Positive for arthralgias. Negative for joint swelling and myalgias.   Skin: Negative for rash and wound.   Allergic/Immunologic: Negative for environmental allergies.   Neurological: Negative for dizziness, syncope and numbness.   Hematological: Does not bruise/bleed easily.   Psychiatric/Behavioral: Negative for dysphoric mood and sleep disturbance. The patient is not nervous/anxious.                Objective:  Vitals       Vitals:     05/29/18 1045   Weight: 87.1 kg (192 lb)   Height: 182.9 cm (72\")         Vitals       1     05/29/18  1045   Weight: 87.1 kg (192 lb)         Vitals:    06/18/18 0655 06/18/18 0700 06/18/18 0705 06/18/18 0710   BP: 104/62 103/65 105/62 100/60   BP Location: Left arm Left arm " Left arm Left arm   Patient Position: Lying Lying Lying Lying   Pulse: 51 56 52 67   Resp: 16 16 16 16   Temp:       TempSrc:       SpO2: 98% 98% 98% 98%   Weight:             Body mass index is 26.04 kg/m².  Physical Exam     Vital signs reviewed.   General: No acute distress.  Eyes: conjunctiva clear; pupils equally round and reactive  ENT: external ears and nose atraumatic; oropharynx clear  CV: no peripheral edema  Resp: normal respiratory effort  Skin: no rashes or wounds; normal turgor  Psych: mood and affect appropriate; recent and remote memory intact           Objective    Ortho Exam     Left knee-prior incisions well-healed, active range of motion 2-140° 4+ out of 5 strength.  Grade 2B Lachman, 2+ anterior drawer with soft endpoint, negative posterior drawer, negative posterior lateral drawer, negative dial test, positive pivot shift test.  Stable to varus and valgus stress at 2 and 30°.  Moderate effusion noted.  No joint line pain, negative Paco exam, moderately positive active patellar compression test.  Negative patellar apprehension test, midline patellar tracking.  Positive sensation light touch all discretions left foot symmetric to the right, brisk cap refill all digits, 2+ dorsalis is pulse left foot.     Imaging:  Review of outside x-rays left knee as well as radiology report indicate prior graft as well as retained hardware from ACL reconstruction with no significant joint space narrowing, no evidence of fracture, dislocation, or subluxation.     Review of outside MRI from [facility] and open MRI left knee, including review of images and MRI report indicates disruption of the proximal end of prior ACL graft with ossific density in the intercondylar notch noted.  No evidence of medial lateral meniscal tear, moderate chondral malacia the patellofemoral joint appreciated.     Assessment:        Assessment      1. Tear of anterior cruciate ligament graft, initial encounter              Plan:        Plan          Discussed treatment options at length with patient- she is still very active this point time with running, jumping, and cutting activities, she feels unstable on her knee, she has failed home exercises and she is very active this point time. She would like to proceed with surgical treatment.  Her preference is for use of hamstring autograft but I did discuss with her that given her prior incision she may have had disruption of Her Hamstring Insertions at Time of Her Prior Surgery..  Thus We Will Have a Bone Patellar Tendon Bone Allograft Available at Time of Surgery.     Plan will be for left knee ACL revision reconstruction with hamstring autograft versus bone patellar tendon bone allograft, hardware removal, meniscal cartilage surgery as indicated. I reviewed details of procedure including pertinent anatomy with the patient as well as risks benefits and alternatives, with the risks including not limited to neurovascular damage, bleeding, infection, re-tear of graft, failure of healing of graft, loss of motion, weakness, stiffness, instability, chondrolysis, DVT, pulmonary embolus, death, stroke, complex regional pain syndrome, myocardial infarction, need for additional procedures.  Patient understood all these had all questions answered before consenting to proceed with surgery.  No guarantees were given regarding results of procedure.     Patient denies any history of DVT or pulmonary embolus, significant cardiac history.        Eleni Vaughan was in agreement with plan and had all questions answered.

## 2018-06-05 ENCOUNTER — ANESTHESIA EVENT (OUTPATIENT)
Dept: PERIOP | Facility: HOSPITAL | Age: 43
End: 2018-06-05

## 2018-06-06 ENCOUNTER — ANESTHESIA (OUTPATIENT)
Dept: PERIOP | Facility: HOSPITAL | Age: 43
End: 2018-06-06

## 2018-06-08 ENCOUNTER — PREP FOR SURGERY (OUTPATIENT)
Dept: OTHER | Facility: HOSPITAL | Age: 43
End: 2018-06-08

## 2018-06-08 DIAGNOSIS — T84.89XA TEAR OF ANTERIOR CRUCIATE LIGAMENT GRAFT, INITIAL ENCOUNTER (HCC): Primary | ICD-10-CM

## 2018-06-14 NOTE — PAT
Called pt to verity surgery and no change in history/meds since PAT visit 6/1/18.  Instructed clears until 2 hrs prior to arrival time, voiced understanding.

## 2018-06-15 ENCOUNTER — TELEPHONE (OUTPATIENT)
Dept: ORTHOPEDIC SURGERY | Facility: CLINIC | Age: 43
End: 2018-06-15

## 2018-06-15 NOTE — TELEPHONE ENCOUNTER
Patient called wanting to let you know she has a few spots of poison oak on her forearm. Will that affect her having surgery on Monday?

## 2018-06-15 NOTE — TELEPHONE ENCOUNTER
NOT FROM MY PERSPECTIVE. AS LONG AS SHE DOES NOT DEVELOP ANY SPOTS OVER THE LEG. SHE NEEDS TO KEEP THEM COVERED AND TRY TO AVOID ITCHING AND CAUSING ANY BREAKS IN THE SKIN

## 2018-06-18 ENCOUNTER — HOSPITAL ENCOUNTER (OUTPATIENT)
Facility: HOSPITAL | Age: 43
Setting detail: HOSPITAL OUTPATIENT SURGERY
Discharge: HOME | End: 2018-06-18
Attending: ORTHOPAEDIC SURGERY | Admitting: ORTHOPAEDIC SURGERY

## 2018-06-18 VITALS
TEMPERATURE: 96.8 F | OXYGEN SATURATION: 95 % | HEART RATE: 53 BPM | WEIGHT: 189 LBS | DIASTOLIC BLOOD PRESSURE: 62 MMHG | RESPIRATION RATE: 16 BRPM | SYSTOLIC BLOOD PRESSURE: 106 MMHG | BODY MASS INDEX: 25.63 KG/M2

## 2018-06-18 DIAGNOSIS — T84.89XA TEAR OF ANTERIOR CRUCIATE LIGAMENT GRAFT, INITIAL ENCOUNTER (HCC): ICD-10-CM

## 2018-06-18 LAB — HCG SERPL QL: NEGATIVE

## 2018-06-18 PROCEDURE — C1713 ANCHOR/SCREW BN/BN,TIS/BN: HCPCS | Performed by: ORTHOPAEDIC SURGERY

## 2018-06-18 PROCEDURE — 25010000002 HYDROMORPHONE PER 4 MG: Performed by: NURSE ANESTHETIST, CERTIFIED REGISTERED

## 2018-06-18 PROCEDURE — C1769 GUIDE WIRE: HCPCS | Performed by: ORTHOPAEDIC SURGERY

## 2018-06-18 PROCEDURE — 29888 ARTHRS AID ACL RPR/AGMNTJ: CPT | Performed by: ORTHOPAEDIC SURGERY

## 2018-06-18 PROCEDURE — 25010000002 ONDANSETRON PER 1 MG: Performed by: NURSE ANESTHETIST, CERTIFIED REGISTERED

## 2018-06-18 PROCEDURE — 84703 CHORIONIC GONADOTROPIN ASSAY: CPT | Performed by: ORTHOPAEDIC SURGERY

## 2018-06-18 PROCEDURE — 20680 REMOVAL OF IMPLANT DEEP: CPT | Performed by: ORTHOPAEDIC SURGERY

## 2018-06-18 PROCEDURE — 25010000003 CEFAZOLIN PER 500 MG: Performed by: ORTHOPAEDIC SURGERY

## 2018-06-18 PROCEDURE — 29881 ARTHRS KNE SRG MNISECTMY M/L: CPT | Performed by: ORTHOPAEDIC SURGERY

## 2018-06-18 PROCEDURE — 25010000002 MIDAZOLAM PER 1 MG: Performed by: NURSE ANESTHETIST, CERTIFIED REGISTERED

## 2018-06-18 PROCEDURE — 25010000002 PROPOFOL 10 MG/ML EMULSION: Performed by: NURSE ANESTHETIST, CERTIFIED REGISTERED

## 2018-06-18 PROCEDURE — 25010000002 FENTANYL CITRATE (PF) 100 MCG/2ML SOLUTION: Performed by: NURSE ANESTHETIST, CERTIFIED REGISTERED

## 2018-06-18 PROCEDURE — 25010000002 DEXAMETHASONE PER 1 MG: Performed by: NURSE ANESTHETIST, CERTIFIED REGISTERED

## 2018-06-18 DEVICE — TNDN SEMITENDENOSIS FZ MIN4MM: Type: IMPLANTABLE DEVICE | Site: KNEE | Status: FUNCTIONAL

## 2018-06-18 DEVICE — 10 MM ENDOBUTTON CL ULTRA
Type: IMPLANTABLE DEVICE | Site: KNEE | Status: FUNCTIONAL
Brand: ENDOBUTTON

## 2018-06-18 DEVICE — BIOSURE REGENSORB INTERFERENCE                                    SCREW 9 MM X 25MM
Type: IMPLANTABLE DEVICE | Site: KNEE | Status: FUNCTIONAL
Brand: BIOSURE

## 2018-06-18 DEVICE — MULTIFIX S-ULTRA 5.5MM KNOTLESS ANCHOR
Type: IMPLANTABLE DEVICE | Site: KNEE | Status: FUNCTIONAL
Brand: MULTIFIX

## 2018-06-18 DEVICE — XTENDOBUTTON FIXATION DEVICE
Type: IMPLANTABLE DEVICE | Site: KNEE | Status: FUNCTIONAL
Brand: XTENDOBUTTON

## 2018-06-18 RX ORDER — ONDANSETRON 2 MG/ML
4 INJECTION INTRAMUSCULAR; INTRAVENOUS ONCE AS NEEDED
Status: COMPLETED | OUTPATIENT
Start: 2018-06-18 | End: 2018-06-18

## 2018-06-18 RX ORDER — LIDOCAINE HYDROCHLORIDE 20 MG/ML
INJECTION, SOLUTION INFILTRATION; PERINEURAL AS NEEDED
Status: DISCONTINUED | OUTPATIENT
Start: 2018-06-18 | End: 2018-06-18 | Stop reason: SURG

## 2018-06-18 RX ORDER — OXYCODONE HCL 10 MG/1
10 TABLET, FILM COATED, EXTENDED RELEASE ORAL ONCE
Status: COMPLETED | OUTPATIENT
Start: 2018-06-18 | End: 2018-06-18

## 2018-06-18 RX ORDER — ASPIRIN 325 MG
325 TABLET, DELAYED RELEASE (ENTERIC COATED) ORAL DAILY
Qty: 14 TABLET | Refills: 0 | Status: SHIPPED | OUTPATIENT
Start: 2018-06-18 | End: 2018-07-24

## 2018-06-18 RX ORDER — SODIUM CHLORIDE 0.9 % (FLUSH) 0.9 %
1-10 SYRINGE (ML) INJECTION AS NEEDED
Status: DISCONTINUED | OUTPATIENT
Start: 2018-06-18 | End: 2018-06-18 | Stop reason: HOSPADM

## 2018-06-18 RX ORDER — SODIUM CHLORIDE 9 MG/ML
40 INJECTION, SOLUTION INTRAVENOUS AS NEEDED
Status: DISCONTINUED | OUTPATIENT
Start: 2018-06-18 | End: 2018-06-18 | Stop reason: HOSPADM

## 2018-06-18 RX ORDER — LIDOCAINE HYDROCHLORIDE 10 MG/ML
0.5 INJECTION, SOLUTION EPIDURAL; INFILTRATION; INTRACAUDAL; PERINEURAL ONCE AS NEEDED
Status: COMPLETED | OUTPATIENT
Start: 2018-06-18 | End: 2018-06-18

## 2018-06-18 RX ORDER — PROPOFOL 10 MG/ML
VIAL (ML) INTRAVENOUS AS NEEDED
Status: DISCONTINUED | OUTPATIENT
Start: 2018-06-18 | End: 2018-06-18 | Stop reason: SURG

## 2018-06-18 RX ORDER — SODIUM CHLORIDE, SODIUM LACTATE, POTASSIUM CHLORIDE, CALCIUM CHLORIDE 600; 310; 30; 20 MG/100ML; MG/100ML; MG/100ML; MG/100ML
100 INJECTION, SOLUTION INTRAVENOUS CONTINUOUS
Status: DISCONTINUED | OUTPATIENT
Start: 2018-06-18 | End: 2018-06-18 | Stop reason: HOSPADM

## 2018-06-18 RX ORDER — ONDANSETRON 2 MG/ML
4 INJECTION INTRAMUSCULAR; INTRAVENOUS ONCE AS NEEDED
Status: DISCONTINUED | OUTPATIENT
Start: 2018-06-18 | End: 2018-06-18 | Stop reason: HOSPADM

## 2018-06-18 RX ORDER — SODIUM CHLORIDE, SODIUM LACTATE, POTASSIUM CHLORIDE, CALCIUM CHLORIDE 600; 310; 30; 20 MG/100ML; MG/100ML; MG/100ML; MG/100ML
9 INJECTION, SOLUTION INTRAVENOUS CONTINUOUS
Status: DISCONTINUED | OUTPATIENT
Start: 2018-06-18 | End: 2018-06-18 | Stop reason: HOSPADM

## 2018-06-18 RX ORDER — OXYCODONE HYDROCHLORIDE AND ACETAMINOPHEN 5; 325 MG/1; MG/1
1-2 TABLET ORAL EVERY 4 HOURS PRN
Qty: 60 TABLET | Refills: 0 | Status: SHIPPED | OUTPATIENT
Start: 2018-06-18 | End: 2018-06-25 | Stop reason: SDUPTHER

## 2018-06-18 RX ORDER — MINERAL OIL 471.99 G/472ML
OIL TOPICAL AS NEEDED
Status: DISCONTINUED | OUTPATIENT
Start: 2018-06-18 | End: 2018-06-18 | Stop reason: HOSPADM

## 2018-06-18 RX ORDER — MIDAZOLAM HYDROCHLORIDE 1 MG/ML
2 INJECTION INTRAMUSCULAR; INTRAVENOUS
Status: DISCONTINUED | OUTPATIENT
Start: 2018-06-18 | End: 2018-06-18 | Stop reason: HOSPADM

## 2018-06-18 RX ORDER — SENNOSIDES 8.6 MG
1 TABLET ORAL NIGHTLY
Qty: 30 EACH | Refills: 0 | Status: SHIPPED | OUTPATIENT
Start: 2018-06-18 | End: 2018-07-24

## 2018-06-18 RX ORDER — ACETAMINOPHEN 500 MG
1000 TABLET ORAL ONCE
Status: COMPLETED | OUTPATIENT
Start: 2018-06-18 | End: 2018-06-18

## 2018-06-18 RX ORDER — HYDROCODONE BITARTRATE AND ACETAMINOPHEN 5; 325 MG/1; MG/1
1 TABLET ORAL ONCE AS NEEDED
Status: COMPLETED | OUTPATIENT
Start: 2018-06-18 | End: 2018-06-18

## 2018-06-18 RX ORDER — ONDANSETRON 4 MG/1
4 TABLET, FILM COATED ORAL EVERY 8 HOURS PRN
Qty: 30 TABLET | Refills: 0 | Status: SHIPPED | OUTPATIENT
Start: 2018-06-18 | End: 2018-07-24

## 2018-06-18 RX ORDER — PREGABALIN 75 MG/1
150 CAPSULE ORAL ONCE
Status: COMPLETED | OUTPATIENT
Start: 2018-06-18 | End: 2018-06-18

## 2018-06-18 RX ORDER — MAGNESIUM HYDROXIDE 1200 MG/15ML
LIQUID ORAL AS NEEDED
Status: DISCONTINUED | OUTPATIENT
Start: 2018-06-18 | End: 2018-06-18 | Stop reason: HOSPADM

## 2018-06-18 RX ORDER — FENTANYL CITRATE 50 UG/ML
INJECTION, SOLUTION INTRAMUSCULAR; INTRAVENOUS AS NEEDED
Status: DISCONTINUED | OUTPATIENT
Start: 2018-06-18 | End: 2018-06-18 | Stop reason: SURG

## 2018-06-18 RX ORDER — DEXAMETHASONE SODIUM PHOSPHATE 4 MG/ML
8 INJECTION, SOLUTION INTRA-ARTICULAR; INTRALESIONAL; INTRAMUSCULAR; INTRAVENOUS; SOFT TISSUE ONCE
Status: COMPLETED | OUTPATIENT
Start: 2018-06-18 | End: 2018-06-18

## 2018-06-18 RX ORDER — MELOXICAM 7.5 MG/1
7.5 TABLET ORAL ONCE
Status: COMPLETED | OUTPATIENT
Start: 2018-06-18 | End: 2018-06-18

## 2018-06-18 RX ORDER — MIDAZOLAM HYDROCHLORIDE 1 MG/ML
1 INJECTION INTRAMUSCULAR; INTRAVENOUS
Status: DISCONTINUED | OUTPATIENT
Start: 2018-06-18 | End: 2018-06-18 | Stop reason: HOSPADM

## 2018-06-18 RX ADMIN — HYDROMORPHONE HYDROCHLORIDE 0.5 MG: 1 INJECTION, SOLUTION INTRAMUSCULAR; INTRAVENOUS; SUBCUTANEOUS at 10:34

## 2018-06-18 RX ADMIN — OXYCODONE HYDROCHLORIDE 10 MG: 10 TABLET, FILM COATED, EXTENDED RELEASE ORAL at 05:55

## 2018-06-18 RX ADMIN — ONDANSETRON 4 MG: 2 INJECTION, SOLUTION INTRAMUSCULAR; INTRAVENOUS at 06:45

## 2018-06-18 RX ADMIN — FENTANYL CITRATE 25 MCG: 50 INJECTION, SOLUTION INTRAMUSCULAR; INTRAVENOUS at 09:14

## 2018-06-18 RX ADMIN — HYDROMORPHONE HYDROCHLORIDE 0.5 MG: 1 INJECTION, SOLUTION INTRAMUSCULAR; INTRAVENOUS; SUBCUTANEOUS at 10:59

## 2018-06-18 RX ADMIN — LIDOCAINE HYDROCHLORIDE 0.1 ML: 10 INJECTION, SOLUTION EPIDURAL; INFILTRATION; INTRACAUDAL; PERINEURAL at 06:05

## 2018-06-18 RX ADMIN — ACETAMINOPHEN 1000 MG: 500 TABLET, FILM COATED ORAL at 05:55

## 2018-06-18 RX ADMIN — SODIUM CHLORIDE, POTASSIUM CHLORIDE, SODIUM LACTATE AND CALCIUM CHLORIDE 9 ML/HR: 600; 310; 30; 20 INJECTION, SOLUTION INTRAVENOUS at 06:05

## 2018-06-18 RX ADMIN — MELOXICAM 7.5 MG: 7.5 TABLET ORAL at 05:56

## 2018-06-18 RX ADMIN — FENTANYL CITRATE 25 MCG: 50 INJECTION, SOLUTION INTRAMUSCULAR; INTRAVENOUS at 08:48

## 2018-06-18 RX ADMIN — PROPOFOL 200 MG: 10 INJECTION, EMULSION INTRAVENOUS at 07:27

## 2018-06-18 RX ADMIN — MIDAZOLAM HYDROCHLORIDE 1 MG: 1 INJECTION, SOLUTION INTRAMUSCULAR; INTRAVENOUS at 06:54

## 2018-06-18 RX ADMIN — HYDROCODONE BITARTRATE AND ACETAMINOPHEN 1 TABLET: 5; 325 TABLET ORAL at 11:40

## 2018-06-18 RX ADMIN — FAMOTIDINE 20 MG: 10 INJECTION, SOLUTION INTRAVENOUS at 06:46

## 2018-06-18 RX ADMIN — FENTANYL CITRATE 25 MCG: 50 INJECTION, SOLUTION INTRAMUSCULAR; INTRAVENOUS at 09:45

## 2018-06-18 RX ADMIN — PREGABALIN 150 MG: 75 CAPSULE ORAL at 05:55

## 2018-06-18 RX ADMIN — FENTANYL CITRATE 25 MCG: 50 INJECTION, SOLUTION INTRAMUSCULAR; INTRAVENOUS at 10:07

## 2018-06-18 RX ADMIN — MIDAZOLAM HYDROCHLORIDE 1 MG: 1 INJECTION, SOLUTION INTRAMUSCULAR; INTRAVENOUS at 06:52

## 2018-06-18 RX ADMIN — CEFAZOLIN SODIUM 2 G: 2 SOLUTION INTRAVENOUS at 07:32

## 2018-06-18 RX ADMIN — DEXAMETHASONE SODIUM PHOSPHATE 8 MG: 4 INJECTION, SOLUTION INTRAMUSCULAR; INTRAVENOUS at 06:47

## 2018-06-18 RX ADMIN — LIDOCAINE HYDROCHLORIDE 100 MG: 20 INJECTION, SOLUTION INFILTRATION; PERINEURAL at 07:27

## 2018-06-18 RX ADMIN — LIDOCAINE HYDROCHLORIDE 100 MG: 20 INJECTION, SOLUTION INFILTRATION; PERINEURAL at 10:11

## 2018-06-18 NOTE — ANESTHESIA PREPROCEDURE EVALUATION
Anesthesia Evaluation     Patient summary reviewed and Nursing notes reviewed   no history of anesthetic complications:  NPO Solid Status: > 8 hours  NPO Liquid Status: > 2 hours           Airway   Mallampati: II  TM distance: >3 FB  Neck ROM: full  No difficulty expected  Dental      Pulmonary     breath sounds clear to auscultation  (+) a smoker (quit 2008) Former,   Cardiovascular   Exercise tolerance: good (4-7 METS)    Rhythm: regular  Rate: normal    (+) valvular problems/murmurs (as a child) murmur,       Neuro/Psych  (+) dizziness/light headedness,     GI/Hepatic/Renal/Endo      Musculoskeletal (-) negative ROS    Abdominal    Substance History   (+) alcohol use (2 drinks per day ),      OB/GYN          Other - negative ROS       ROS/Med Hx Other: 0515 clear liquids                   Anesthesia Plan    ASA 2     general     intravenous induction   Anesthetic plan and risks discussed with patient.  Use of blood products discussed with patient  Consented to blood products.

## 2018-06-18 NOTE — NURSING NOTE
Patient complains of  intermittent numbness in left foot (heel up to middle of arch).  Dr. Cooper notified. Instructed to inform patient to follow current discharge orders to ice and elevate extremity.

## 2018-06-18 NOTE — OP NOTE
Date of Surgery: 6/18/18    PREOPERATIVE DIAGNOSES:  1. Left knee anterior cruciate ligament graft tear.    2.  Retained hardware left knee     POSTOPERATIVE DIAGNOSES:  1. Left knee anterior cruciate ligament graft tear.    2. Left knee lateral meniscal tear.   3.  Retained hardware left knee    PROCEDURES PERFORMED:    1. Left knee anterior cruciate ligament revision reconstruction with hamstring autograft and allograft supplement.    2. Left knee partial lateral meniscectomy.   3.  Removal hardware left    SURGEON: Matthew Cooper MD     ASSISTANT: ST Sushila    ANESTHESIA: General endotracheal anesthesia with a regional block.     ESTIMATED BLOOD LOSS: minimal    URINE OUTPUT: Not recorded.     FLUIDS: Per Anesthesia.     COMPLICATION: None.     SPECIMEN: None.     DRAIN: None.     Tourniquet Times:     Inflated: 6/18/2018  7:58 AM     Deflated: 6/18/2018 10:06 AM    IMPLANT: Hamstring autograft, 6-strand, 9.5 mm in size on tibial and femoral sides with semitendinosus allograft, Smith and Nephew 10 mm continuous loop Endobutton with Xtendobutton attachment,  9 x 25 mm biointerference screw, 5.5 mm multi-fix anchor ×2     EXAMINATION UNDER ANESTHESIA: Passive range of motion of left knee is 0° to  140°, minimal effusion, stable to varus and valgus stress 0° and 30°. Positive pivot shift, grade 2B Lachman, 2+ anterior drawer. No endpoint, negative posterior drawer, negative posterolateral drawer, negative dial test.  Prior incisions well-healed.  Midline patellar tracking and 2+ dorsalis pedis pulse left foot.     ARTHROSCOPIC FINDINGS:    1. Suprapatellar pouch: Free of loose bodies.    2. Medial and lateral gutters: 2 small loose bodies in mediolateral gutter noted    3. Patellofemoral joint: Grade 4 chondral wear central trochlea, grade 2/3 chondral wear medial patellar facet, midline patellar tracking.    4. Medial compartment: No evidence of meniscal tear, cartilage intact.    5. Lateral compartment:  Interim meniscal tear of body of lateral meniscus involving approximately 10% total meniscal volume, cartilage intact.    6. Notch: ACL graft rupture, PCL intact.     INDICATIONS: The patient is a pleasant 42 y.o. female with significant history of buckling and pain to the left knee with prior history of left ACL reconstruction greater than 20 years ago. MRI indicated a complete rupture of anterior cruciate ligament graft. Given buckling episodes as well as associated pain and the patient's desire to continue to participate in cutting and pivoting activities, patient wished to proceed with above-mentioned surgery.     INFORMED CONSENT: Patient was explained details of the procedure, as well as risks, benefits, and alternatives as documented on the History and Physical, and had all questions answered prior to signing the operative consent form. No guarantees were given in regard to results of the surgery.     DESCRIPTION OF PROCEDURE: The patient was seen, evaluated, and cleared for surgery by Anesthesia. Met in the preoperative holding area and the operative site marked, consent was reviewed, History and Physical was completed, and preoperative labs were reviewed. A regional block was then placed per Anesthesia. The patient was then taken to the operating room and placed in the supine position on a regular OR table. After successful intubation per Anesthesia examination under anesthesia was carried out at this point in time. A nonsterile tourniquet was applied to the left lower extremity. The left leg was placed in a leg silvestre and the contralateral leg was placed in a stirrup. The patient was secured to the table with a waist strap. All bony prominences were well-padded. The left leg was then sterilely prepped and draped in a standard fashion.     Formal timeout was completed, including confirmation of History and Physical, operative consent, surgical site, patient identification number, and preoperative antibiotic  administration. The left leg was then exsanguinated and the tourniquet was inflated to 250 mmHg.     Procedures then begun with standard anterolateral portal being made with a #11 blade followed by insertion of blunt trocar and a cannula. The scope was then inserted at this point in time. Standard anteromedial and far medial portal were then made with a spinal needle using outside-in technique. A probe was then inserted and diagnostic arthroscopic exam was carried out at this point in time with findings noted above.     Attention was initially turned to debridement of ACL graft and minimal notchplasty being carried out this point time.    Attention was then turned to partial lateral meniscectomy with the combination of handheld biter, shaver, and ablation wand used to complete the partial lateral meniscectomy. Approximately 10% of total meniscal volume was resected from the body of the lateral meniscus, and the lateral meniscal root was noted to be intact at this point in time.     Given the tear of the ACL graft, attention was then turned to exposure of the tibial hardware for removal.  Distal 6 cm portion of prior anterior incision was followed through skin and subcutaneous tissues with 15 blade.  Deep dissection was carried out bluntly as well as with Metzenbaum scissor.  Staple and screw were identified at this point in time, screw and washer removed without difficulty with screwdriver.  Staple insertion site was opened, impaction device was passed into the staple and was back slapped at this time with successful removal of the staple.      Attention was then turned to harvesting of hamstring tendons.  They were noted to be detached from the anteromedial face of the tibia but were still viable is a were scarred into local tissue. Gracilis and semitendinosus tendons were then isolated from the sartorial fascia and distal end was whipstitched with a running locking suture. Adhesions were removed under direct  visualization with the Metzenbaum scissors. Tendon stripper was then used to harvest each of these tendons in a standard fashion. Tendons were then taken to the back table and muscular tissue was stripped from the tissue. Proximal end of the tendons was whipstitched with a running locking stitch and sized at this point in time noted to be 7.5 mm in diameter.  Thus a semitendinosus allograft was thawed and opened on the back table, proximal and distal ends were whipstitched in standard fashion.  It was looped through the Endobutton and sized, completing a 6 strand hamstring graft, with size 9.5 mm in diameter. They were assembled over a 10 mm continuous loop Endobutton on the back table under 15 pounds of tension. Proximal end of the graft was whipstitched with a 0 Vicryl to tubularize the graft at this time. Moist saline-soaked gauze was then placed over the graft.     Attention was then returned to the ACL revision reconstruction. The femoral attachment site on the lateral wall was identified at this time.  Prior hardware was identified and the femoral notch but was felt to be out of the way of our proposed tunnel at the anatomic insertion site onto the femoral wall. The outside-in Velez and Nephew pinpoint guide was then placed. Graft had been sized as 9.5 mm and the 9 mm guide was used to at this time. A 4 cm incision was made over the lateral femoral condyle proximal to the epicondyle at this time through skin only. Trocar guide was advanced to the lateral cortex. Guide pin was fired but was noted to be obstructed by the femoral metal screw.  Thus a screwdriver was inserted through the anterior portal, impacted into the screw, and screws removed at this point time.  Guide pin was once again fired, this time entering the knee joint and noted to be in acceptable position. Longitudinal split was made in the IT band with a #11 blade and Gelpi retractor was placed. A 6 mm reamer was used at first, guide pin was  repositioned and tunnel adjusted posteriorly and inferiorly on the lateral wall to achieve an improved position. An 9.5 mm reamer was then passed over the guide pin at this time completing the femoral tunnel. A shaver was then used to chamfer smooth the edges of the tunnel as well as remove bony debris.   Attention was then turned to the tibial tunnel with the tibial guide set at 55°. Tibial guide was inserted through the far medial portal. The tip of the guide was placed to the anterior horn of the lateral meniscus and just lateral to the medial tibial spine. Guide pin was fired into the joint at this point in time through the prior hamstring incision. A 6 mm reamer was used followed by 9.5 mm reamer in standard fashion creating the tibial tunnel. Edges of the tunnel were chamfered smooth with a shaver.   A passing suture was then passed from the lateral thigh through the outside-in tunnel on the femur into the notch and retrieved through the distal end of the tibial tunnel at this time. Graft was then passed through mineral oil and brought to the operative site. Lead sutures from the Xtendobutton were passed with a shuttling suture brought up through the lateral soft tissues. Xtendobutton and graft were then pulled into the notch and subsequently into the femoral tunnel. The Xtendobutton was then visualized on the lateral cortex and flipped in standard fashion achieving proximal fixation. Traction was pulled aggressively across the tibial side of the graft and there was noted to be successful deployment of the Xtendobutton as well as appropriate fixation on the femoral side of the graft. The knee was then cycled from 0° to 130° 30 times to reduce creep in the graft.   Arthroscopic images were then taken with the knee in full extension with no evidence of anterior, medial, or lateral impingement noted.   Attention was then turned to tibial-sided fixation with the knee brought to 30° of flexion with traction pulled  across the tibial side of the graft as well as a gentle posterior drawer. An 9 x 25 mm bio-interference screw was placed over a flexible guide pin, tension was applied to suture strands and screw was advanced until it was flush with the face of the tibia. Arthroscope was re-inserted, graft examined and noted to have good tension with a probe as well as on Lachman exam with direct visualization. Extraneous sutures were then additionally secured with backup fixation using 5.5 mm multi-fix anchor ×2 over the anterior medial face of the tibia.  The knee was then thoroughly irrigated with normal saline through the scope and with an open cannula. Fluid was then evacuated from the knee with suction. Open incisions were thoroughly irrigated at this time as well. Attention was then turned to closure of the wounds with IT band closed with 0 Vicryl in interrupted fashion, subcutaneous layer was closed with 2-0 Vicryl, and skin closure with 3-0 Monocryl and Prineo mesh and glue. The wounds were dressed with Xeroform gauze, 4 x 4's, ABD pad, Webril, ACE wrap, ice pack, and a knee brace locked in extension.   At the end of the procedure all lap, needle, and sponge counts were correct x2. The patient had brisk capillary refill to all digits of the left lower extremity. Compartments were soft and easily compressible at the end of the procedure.     DISPOSITION: The patient was extubated per Anesthesia and taken to the recovery room in stable condition. Patient will be discharged home in the care of family and follow up in 1 week for wound check. Will start physical therapy on postoperative day 3 or 4, weight-bear as tolerated, and brace locked in extension when upright. I discussed results of the procedure as well as postoperative precautions with the family after the surgery and they had all questions answered at that time.  mg daily for DVT prophylaxis.

## 2018-06-18 NOTE — ANESTHESIA PROCEDURE NOTES
Airway  Urgency: elective    Date/Time: 6/18/2018 7:28 AM  End Time:6/18/2018 7:28 AM  Airway not difficult    General Information and Staff    Patient location during procedure: OR  CRNA: LASHAUN MCKEON    Indications and Patient Condition    Preoxygenated: yes  MILS maintained throughout  Mask difficulty assessment: 0 - not attempted    Final Airway Details  Final airway type: supraglottic airway      Successful airway: unique  Size 3    Number of attempts at approach: 1    Additional Comments  BEBS, +ETCO2, VSS. TEETH AND GUMS AS PRE-OP.

## 2018-06-18 NOTE — ANESTHESIA PROCEDURE NOTES
Peripheral Block    Patient location during procedure: OB  Start time: 6/18/2018 6:55 AM  Stop time: 6/18/2018 7:08 AM  Performed by  CRNA: LASHAUN MCKEON  Preanesthetic Checklist  Completed: patient identified, site marked, surgical consent, pre-op evaluation, timeout performed, IV checked, risks and benefits discussed and monitors and equipment checked  Prep:  Pt Position: supine  Sterile barriers:cap and gloves  Prep: ChloraPrep  Patient monitoring: blood pressure monitoring, continuous pulse oximetry and EKG  Procedure  Sedation:yes    Guidance:ultrasound guided  Images:still images obtained    Laterality:left  Block Type:adductor canal block  Injection Technique:single-shot  Needle Type:echogenic  Resistance on Injection: none  Medications  Local Injected:bupivacaine 0.5% with epinephrine Local Amount Injected:20mL  Post Assessment  Injection Assessment: no paresthesia on injection and incremental injection (initial +heme, repositione needle with heme cleared, no other heme noted continued with block)  Patient Tolerance:comfortable throughout block  Complications:no  Additional Notes  Lidocaine 2% 1ml skin infiltration

## 2018-06-18 NOTE — ANESTHESIA POSTPROCEDURE EVALUATION
Patient: Eleni Vaughan    Procedure Summary     Date:  06/18/18 Room / Location:   [facility] OR 3 / [facility]    Anesthesia Start:  0726 Anesthesia Stop:  1020    Procedure:  REVISION KNEE ANTERIOR CRUCIATE LIGAMENT RECONSTRUCTION WITH HAMSTRING AUTOGRAFT, possible bone patella tendon bone allograft, hardware removal. meniscal and cartilage surgery as indicated (Left Knee) Diagnosis:       Tear of anterior cruciate ligament graft, initial encounter      (Tear of anterior cruciate ligament graft, initial encounter [T84.89XA])    Surgeon:  Matthew Cooper MD Provider:  Cheri Young CRNA    Anesthesia Type:  general ASA Status:  2          Anesthesia Type: general  Last vitals  BP   105/60 (06/18/18 1112)   Temp   96.8 °F (36 °C) (06/18/18 1112)   Pulse   78 (06/18/18 1112)   Resp   14 (06/18/18 1112)     SpO2   96 % (06/18/18 1112)     Post Anesthesia Care and Evaluation    Patient location during evaluation: PHASE II  Patient participation: complete - patient participated  Level of consciousness: awake and alert  Pain score: 5  Pain management: adequate  Airway patency: patent  Anesthetic complications: No anesthetic complications  PONV Status: none  Cardiovascular status: acceptable  Respiratory status: acceptable  Hydration status: acceptable    Comments: Patient reports tingling bottom of foot, Dr. Cooper informed.

## 2018-06-21 ENCOUNTER — TELEPHONE (OUTPATIENT)
Dept: ORTHOPEDIC SURGERY | Facility: CLINIC | Age: 43
End: 2018-06-21

## 2018-06-21 ENCOUNTER — TREATMENT (OUTPATIENT)
Dept: PHYSICAL THERAPY | Facility: CLINIC | Age: 43
End: 2018-06-21

## 2018-06-21 DIAGNOSIS — Z98.890 S/P RECONSTRUCTION OF ANTERIOR CRUCIATE LIGAMENT: Primary | ICD-10-CM

## 2018-06-21 PROCEDURE — 97014 ELECTRIC STIMULATION THERAPY: CPT | Performed by: PHYSICAL THERAPIST

## 2018-06-21 PROCEDURE — 97161 PT EVAL LOW COMPLEX 20 MIN: CPT | Performed by: PHYSICAL THERAPIST

## 2018-06-21 PROCEDURE — 97112 NEUROMUSCULAR REEDUCATION: CPT | Performed by: PHYSICAL THERAPIST

## 2018-06-21 PROCEDURE — 97110 THERAPEUTIC EXERCISES: CPT | Performed by: PHYSICAL THERAPIST

## 2018-06-21 NOTE — PROGRESS NOTES
Physical Therapy Initial Evaluation and Plan of Care    TIME IN 15:30 TIME OUT 17:02  Patient: Eleni Vaughan   : 1975  Diagnosis/ICD-10 Code:  S/P reconstruction of anterior cruciate ligament [Z98.890]  Referring practitioner: Matthew Cooper MD    Subjective Evaluation    History of Present Illness  Onset date: Feb-2018.  Date of surgery: 2018  Mechanism of injury: Pt is a 43 y/o WF who reports to the clinic s/p left ACL reconstruction on 2018.  Pt originally injured left knee playing B-Ball and had ACL reconstruction with a patellar tendon graft in .   Pt had full recovery and played [employer] basketball in a brace for 2 yrs-no problems.  Pt states in 2018-she started a new work-out with legs that involved more twisting and sliders and her knee was becoming more stiff and swelling.  Pt states with rest and ice the symptoms would improve-return to work-out and the symptoms would return, so went to [facility]-x-ray-drained knee with bloody drainage-MRI-returned to [facility]-referred to Dr. Moore-scheduled surgery.        Patient Occupation: work from home  Quality of life: good    Pain  Current pain ratin  At worst pain rating: 3  Location: left medial knee and posterior knee   Quality: dull ache and knife-like  Relieving factors: medications and ice  Aggravating factors: standing, ambulation and lifting    Social Support  Lives with: spouse and young children (6 and 4 yr old at home)    Hand dominance: right    Diagnostic Tests  X-ray: normal  MRI studies: abnormal (mild to moderate degenerative changes, disruption of the proximal graft )    Treatments  Current treatment: medication  Patient Goals  Patient goals for therapy: decreased pain, increased motion, increased strength, return to sport/leisure activities and decreased edema             Objective       Observations   Left Knee   Positive for effusion and incision.     Additional Observation Details  Moderate  effusion left knee  Incisions covered with bandage  No drainage on dressing, besides one pin drop of dried blood over left lateral and inferior knee site.    Removed ace wrap and top layer of dressing and rewrapped left leg with 2 ace wraps after treatment.      Palpation   Left   Tenderness of the distal biceps femoris and distal semitendinosus.     Tenderness   Left Knee   Tenderness in the medial joint line.     Neurological Testing     Sensation     Knee   Left Knee   Diminished: light touch     Comments   Left light touch: left L4 dermatome lower tibia .     Active Range of Motion     Right Knee   Flexion: 148 degrees     Additional Active Range of Motion Details  Right knee +3 HE     Passive Range of Motion   Left Knee   Flexion: 62 degrees   Extension: 5 degrees     Patellar Mobility   Left Knee Patellar tendons within functional limits include the medial, lateral, superior and inferior.     Strength/Myotome Testing     Left Hip   Planes of Motion   Flexion: 2+  Abduction: 3  Adduction: 3    Right Hip   Planes of Motion   Flexion: 4+  Abduction: 5  Adduction: 5    Left Knee   Flexion: 2  Extension: 1  Quadriceps contraction: poor    Right Knee   Flexion: 5  Extension: 5  Quadriceps contraction: good    Left Ankle/Foot   Dorsiflexion: 5  Plantar flexion: 5    Right Ankle/Foot   Dorsiflexion: 5  Plantar flexion: 5  Inversion: 5  Eversion: 5    Tests     Left Hip   SLR: Positive.     Right Hip   90/90 SLR: Negative.  SLR: Negative.     Swelling     Left Knee Girth Measurement (cm)   Joint line: MJ 42.5.  10 cm above joint line: SP 43.5.  10 cm below joint line: 40.5.    Right Knee Girth Measurement (cm)   Joint line: MJ 39.3.  10 cm above joint line: SP 42.9.  10 cm below joint line: IP 38.3.    Ambulation     Observational Gait   Decreased walking speed.     Additional Observational Gait Details  Pt enters department NWB LLE in post-op locked hinged knee brace with 2 ace wraps around knee.           Assessment  & Plan     Assessment  Impairments: abnormal gait, abnormal muscle firing, abnormal muscle tone, abnormal or restricted ROM, activity intolerance, impaired balance, impaired physical strength, lacks appropriate home exercise program, pain with function and weight-bearing intolerance  Assessment details: Pt is a 41 y/o WF who reports to the clinic with c/o left knee pain, decreased flexibility, tenderness, knee effusion, and decreased functional mobility secondary to being S/P left ACL reconstruction.        Prognosis: good  Functional Limitations: lifting, walking, uncomfortable because of pain, standing and stooping  Goals  Plan Goals: STGs: 4-6 weeks  1. Decrease left  knee pain 1/10 at it's worst  2. Increase left knee AROM 0-125 degrees   3.  Decrease left medial knee tenderness to none with palpation   4.  Increase left hamstring flexibility to WNL   5.  Pt ambulates into clinic without an AD minimal antalgic gait LLE   6.  Decrease left knee effusion at girth measurements by 1-2 cm.    LTGs: 6-10 weeks  1.  Pt Independent with HEP.  2.  Increase left knee AROM 0-140 degrees   3.  Increase left knee and hip strength to 5/5   4.  Pt ambulates left LE without an antalgic gait.  5.  Pt without left knee effusion.        Plan  Therapy options: will be seen for skilled physical therapy services  Planned modality interventions: cryotherapy, electrical stimulation/Russian stimulation and thermotherapy (hydrocollator packs)  Planned therapy interventions: joint mobilization, home exercise program, gait training, flexibility, strengthening, stretching, functional ROM exercises and therapeutic activities  Frequency: 3x week  Duration in weeks: 12  Treatment plan discussed with: patient        Manual Therapy:         mins  29978;  Therapeutic Exercise:   20      mins  10887;     Neuromuscular Shlomo:  10      mins  15564;    Therapeutic Activity:          mins  03474;     Gait Training:           mins  29695;      Ultrasound:          mins  09559;    Electrical Stimulation:    15     mins  96436 ( );  Dry Needling          mins self-pay    Timed Treatment:  30    mins   Total Treatment:     92   mins    PT SIGNATURE: Cheri Granado, PT   DATE TREATMENT INITIATED: 6/21/2018    Initial Certification  Certification Period: 9/19/2018  I certify that the therapy services are furnished while this patient is under my care.  The services outlined above are required by this patient, and will be reviewed every 90 days.     PHYSICIAN: Matthew Cooper MD      DATE:     Please sign and return via fax to [phone].. Thank you, [facility].

## 2018-06-21 NOTE — TELEPHONE ENCOUNTER
"Paulina Guevara PT calling in regards to Mrs. Vaughan dressing on her knee per Dr. Asencio's OP note \"Patient will be discharged home in the care of family and follow up in 1 week for wound check. Will start physical therapy on postoperative day 3 or 4, weight-bear as tolerated, and brace locked in extension when upright. I discussed results of the procedure as well as postoperative precautions with the family after the surgery and they had all questions answered at that time.  mg daily for DVT prophylaxis.\" Ok was given to remove the post op wrap down the the clear  Tegaderm dressing which should stay in place until seen back in the office with Dr. Cooper.    Thanks.     "

## 2018-06-25 ENCOUNTER — TREATMENT (OUTPATIENT)
Dept: PHYSICAL THERAPY | Facility: CLINIC | Age: 43
End: 2018-06-25

## 2018-06-25 DIAGNOSIS — Z98.890 S/P RECONSTRUCTION OF ANTERIOR CRUCIATE LIGAMENT: Primary | ICD-10-CM

## 2018-06-25 DIAGNOSIS — T84.89XD TEAR OF ANTERIOR CRUCIATE LIGAMENT GRAFT, SUBSEQUENT ENCOUNTER: ICD-10-CM

## 2018-06-25 PROCEDURE — 97530 THERAPEUTIC ACTIVITIES: CPT | Performed by: PHYSICAL THERAPIST

## 2018-06-25 PROCEDURE — 97014 ELECTRIC STIMULATION THERAPY: CPT | Performed by: PHYSICAL THERAPIST

## 2018-06-25 PROCEDURE — 97110 THERAPEUTIC EXERCISES: CPT | Performed by: PHYSICAL THERAPIST

## 2018-06-25 RX ORDER — OXYCODONE HYDROCHLORIDE AND ACETAMINOPHEN 5; 325 MG/1; MG/1
1-2 TABLET ORAL EVERY 4 HOURS PRN
Qty: 60 TABLET | Refills: 0 | Status: SHIPPED | OUTPATIENT
Start: 2018-06-25 | End: 2018-07-24

## 2018-06-25 NOTE — PROGRESS NOTES
Re-Assessment / Re-Certification      Patient: Eleni Vaughan   : 1975  Diagnosis/ICD-10 Code:  S/P reconstruction of anterior cruciate ligament [Z98.890]  Referring practitioner: Matthew Cooper MD  Date of Initial Visit: 2018  Today's Date: 2018  Patient seen for 2 sessions      Subjective:   Eleni Vaughan reports: Overall feeling better - still feels really tight and pain end-range    Clinical Progress: improved  Home Program Compliance: Yes  Treatment has included: therapeutic exercise, neuromuscular re-education, therapeutic activity, gait training, electrical stimulation and cryotherapy    Subjective   Objective       Active Range of Motion   Left Knee   Flexion: 73 degrees with pain  Extension: 6 (lacking ) degrees with pain     Assessment & Plan     Assessment  Assessment details: Patient presents with improved knee flexion ROM. Deficits persist in decreased ROM, strength, and tolerance to weight-bearing. Patient would benefit from continued PT 3x per week for mobility, strength and stabilization to return to full functional activity.       Progress toward previous goals: Partially Met    New Goals   Plan Goals: STGs: 4-6 weeks  1. Decrease left  knee pain 1/10 at it's worst  2. Increase left knee AROM 0-125 degrees   3.  Decrease left medial knee tenderness to none with palpation   4.  Increase left hamstring flexibility to WNL   5.  Pt ambulates into clinic without an AD minimal antalgic gait LLE   6.  Decrease left knee effusion at girth measurements by 1-2 cm.  LTGs: 6-10 weeks  1.  Pt Independent with HEP.  2.  Increase left knee AROM 0-140 degrees   3.  Increase left knee and hip strength to 5/5   4.  Pt ambulates left LE without an antalgic gait.  5.  Pt without left knee effusion      Recommendations: Continue with recommendations ROM, strength, and stabilization  Timeframe: 2 months  Prognosis to achieve goals: good    PT Signature: Annetta Julian, PT      Based upon  review of the patient's progress and continued therapy plan, it is my medical opinion that Eleni Vaughan should continue physical therapy treatment at [facility]  [address] [place] [id number] [phone].    Signature: __________________________________  Matthew Cooper MD    Manual Therapy:         mins  94009;  Therapeutic Exercise:    25     mins  27806;     Neuromuscular Shlomo:        mins  39127;    Therapeutic Activity:     10     mins  09960;     Gait Training:           mins  88537;     Ultrasound:          mins  37387;    Electrical Stimulation:    25     mins  49981 ( );  Dry Needling          mins self-pay    Timed Treatment:   35   mins   Total Treatment:     60   mins

## 2018-06-26 PROBLEM — T84.89XA TEAR OF ANTERIOR CRUCIATE LIGAMENT GRAFT (HCC): Status: RESOLVED | Noted: 2018-05-31 | Resolved: 2018-06-26

## 2018-06-26 PROBLEM — Z98.890 S/P ACL RECONSTRUCTION: Status: ACTIVE | Noted: 2018-06-26

## 2018-06-27 ENCOUNTER — TREATMENT (OUTPATIENT)
Dept: PHYSICAL THERAPY | Facility: CLINIC | Age: 43
End: 2018-06-27

## 2018-06-27 DIAGNOSIS — Z98.890 S/P RECONSTRUCTION OF ANTERIOR CRUCIATE LIGAMENT: Primary | ICD-10-CM

## 2018-06-27 DIAGNOSIS — T84.89XD TEAR OF ANTERIOR CRUCIATE LIGAMENT GRAFT, SUBSEQUENT ENCOUNTER: ICD-10-CM

## 2018-06-27 PROCEDURE — 97014 ELECTRIC STIMULATION THERAPY: CPT | Performed by: PHYSICAL THERAPIST

## 2018-06-27 PROCEDURE — 97112 NEUROMUSCULAR REEDUCATION: CPT | Performed by: PHYSICAL THERAPIST

## 2018-06-27 PROCEDURE — 97110 THERAPEUTIC EXERCISES: CPT | Performed by: PHYSICAL THERAPIST

## 2018-06-27 NOTE — PROGRESS NOTES
"Physical Therapy Daily Progress Note    Time In 9:37  Time Out 10:46    Visit # : 3  Eleni Vaughan reports: her knee has been aching more the last two days.  Pt states she has only taken her ibuprofen the last two days.  Reports her arms are feeling really \"itchy and anxious\" the last few nights and not sure.  Having trouble going to sleep at night.      Subjective     Objective   See Exercise, Manual, and Modality Logs for complete treatment.       Assessment & Plan     Assessment  Assessment details: Pt is tolerating treatment fairly well, but still very limited in flexion and quad control during SLR and QS.  Pt is progressing well with gait.  Pt enters department WBAT with B crutches.  Will continue to see pt 3x/week for ROM, strengthening, gait training, and modalities PRN.          Progress per Plan of Care           Manual Therapy:         mins  52626;  Therapeutic Exercise:   30      mins  95974;     Neuromuscular Shlomo: 10       mins  95039;    Therapeutic Activity:          mins  56642;     Gait Training:           mins  41761;     Ultrasound:          mins  35861;    Electrical Stimulation:   15      mins  66250 ( );  Dry Needling          mins self-pay    Timed Treatment:  40    mins   Total Treatment:    69   mins    Cheri Granado, PT  Physical Therapist  "

## 2018-06-28 ENCOUNTER — TREATMENT (OUTPATIENT)
Dept: PHYSICAL THERAPY | Facility: CLINIC | Age: 43
End: 2018-06-28

## 2018-06-28 DIAGNOSIS — Z98.890 S/P RECONSTRUCTION OF ANTERIOR CRUCIATE LIGAMENT: Primary | ICD-10-CM

## 2018-06-28 PROCEDURE — 97530 THERAPEUTIC ACTIVITIES: CPT | Performed by: PHYSICAL THERAPIST

## 2018-06-28 PROCEDURE — 97014 ELECTRIC STIMULATION THERAPY: CPT | Performed by: PHYSICAL THERAPIST

## 2018-06-28 PROCEDURE — 97112 NEUROMUSCULAR REEDUCATION: CPT | Performed by: PHYSICAL THERAPIST

## 2018-06-28 PROCEDURE — 97110 THERAPEUTIC EXERCISES: CPT | Performed by: PHYSICAL THERAPIST

## 2018-06-28 NOTE — PROGRESS NOTES
Physical Therapy Daily Progress Note    Time In 9:52  Time Out 11:12    Visit # : 4  Eleni Vaughan reports: knee is doing okay, but still having trouble sleeping at night.     Subjective     Objective       Active Range of Motion   Left Knee   Flexion: 83 degrees   Extension: 0 degrees      See Exercise, Manual, and Modality Logs for complete treatment.       Assessment & Plan     Assessment  Assessment details: Pt is tolerating treatment fairly well, but still very limited in flexion.  Pt has improved quad control and is able to complete 40 reps of SLRs with only assist to initiate leg raise.  Pt enters department WBAT with B crutches.   Will continue to see pt 3x/week for ROM, strengthening, gait training, and modalities PRN.          Progress per Plan of Care           Manual Therapy:         mins  56394;  Therapeutic Exercise:  30       mins  55260;     Neuromuscular Shlomo:  10      mins  77569;    Therapeutic Activity:     15     mins  80820;     Gait Training:           mins  82957;     Ultrasound:          mins  59519;    Electrical Stimulation:    15     mins  55686 ( );  Dry Needling          mins self-pay    Timed Treatment:  55    mins   Total Treatment:    80    mins    Cheri Granado, PT  Physical Therapist

## 2018-06-29 ENCOUNTER — OFFICE VISIT (OUTPATIENT)
Dept: ORTHOPEDIC SURGERY | Facility: CLINIC | Age: 43
End: 2018-06-29

## 2018-06-29 DIAGNOSIS — Z98.890 S/P ARTHROSCOPIC PARTIAL LATERAL MENISCECTOMY: ICD-10-CM

## 2018-06-29 DIAGNOSIS — Z98.890 S/P ACL RECONSTRUCTION: Primary | ICD-10-CM

## 2018-06-29 PROCEDURE — 99024 POSTOP FOLLOW-UP VISIT: CPT | Performed by: ORTHOPAEDIC SURGERY

## 2018-06-29 RX ORDER — ACETAMINOPHEN,DIPHENHYDRAMINE HCL 500; 25 MG/1; MG/1
1 TABLET, FILM COATED ORAL NIGHTLY PRN
COMMUNITY
End: 2018-07-24

## 2018-06-29 NOTE — PROGRESS NOTES
CC:Status post left knee ACL revision reconstruction with hamstring autograft and allograft supplement, partial lateral meniscectomy, DOS 6/18/2018     Interval history: Patient returns to clinic today, 1 week from surgery, doing well with physical therapy in regards to strengthening, range of motion, and ambulation.  Denies any locking, catching, or giving way of left knee.  Has continued to wear the brace as instructed.  Denies any numbness, tingling, or issues with incisions over the knee.     Physical exam:               Left knee:              Incisions- clean dry, and intact, healing well              Effusion moderate              ROM- 2- 85 degrees              Strength-  4/5              Positive sensation light touch               Brisk cap refill              No calf pain, negative Josee's sign bilateral lower extremities     Impression: Status post left knee ACL reconstruction, partial lateral meniscectomy      Plan:  1.  Continue with physical therapy 3 times per week for work on range of motion and strengthening.  2.  Keep hinge knee brace locked during any weightbearing activities, may unlock for PT and while seated or supine.  3. Will wean off crutches as gait pattern normalizes under the direction of physical therapist  4.  Will follow-up in 3 weeks for reevaluation of motion and strength and xrays.

## 2018-07-02 ENCOUNTER — TREATMENT (OUTPATIENT)
Dept: PHYSICAL THERAPY | Facility: CLINIC | Age: 43
End: 2018-07-02

## 2018-07-02 DIAGNOSIS — Z98.890 S/P RECONSTRUCTION OF ANTERIOR CRUCIATE LIGAMENT: Primary | ICD-10-CM

## 2018-07-02 DIAGNOSIS — T84.89XD TEAR OF ANTERIOR CRUCIATE LIGAMENT GRAFT, SUBSEQUENT ENCOUNTER: ICD-10-CM

## 2018-07-02 PROCEDURE — 97530 THERAPEUTIC ACTIVITIES: CPT | Performed by: PHYSICAL THERAPIST

## 2018-07-02 PROCEDURE — 97110 THERAPEUTIC EXERCISES: CPT | Performed by: PHYSICAL THERAPIST

## 2018-07-02 PROCEDURE — 97112 NEUROMUSCULAR REEDUCATION: CPT | Performed by: PHYSICAL THERAPIST

## 2018-07-02 PROCEDURE — 97014 ELECTRIC STIMULATION THERAPY: CPT | Performed by: PHYSICAL THERAPIST

## 2018-07-02 NOTE — PROGRESS NOTES
Physical Therapy Daily Progress Note        Visit # : 5  Eleni Vaughan reports: My leg feels a lot more strong today - able to shower and drive now- walking around some without crutches     Subjective     Objective       Active Range of Motion   Left Knee   Flexion: 95 degrees with pain  Extension: 6 (lacking) degrees with pain     See Exercise, Manual, and Modality Logs for complete treatment.       Assessment & Plan     Assessment  Assessment details: Patient presents with improved knee flexion and extension ROM. Deficits persist in decreased ROM, strength, and tolerance to weight-bearing. Improving quad contraction. Continued PT 3x per week for mobility, strength and stabilization to return to full functional activity- next visit attempt standing Baps if tolerated.         Progress strengthening /stabilization /functional activity           Manual Therapy:        mins  50310;  Therapeutic Exercise:    30     mins  97564;     Neuromuscular Shlomo:    10    mins  71557;    Therapeutic Activity:     15     mins  21758;     Gait Training:           mins  57250;     Ultrasound:          mins  15608;    Electrical Stimulation:    15     mins  99737 ( );  Dry Needling          mins self-pay    Timed Treatment:   55   mins   Total Treatment:     70   mins    Annetta Julian PT  Physical Therapist  [place] License # [id number]

## 2018-07-03 ENCOUNTER — TREATMENT (OUTPATIENT)
Dept: PHYSICAL THERAPY | Facility: CLINIC | Age: 43
End: 2018-07-03

## 2018-07-03 DIAGNOSIS — T84.89XD TEAR OF ANTERIOR CRUCIATE LIGAMENT GRAFT, SUBSEQUENT ENCOUNTER: ICD-10-CM

## 2018-07-03 DIAGNOSIS — Z98.890 S/P RECONSTRUCTION OF ANTERIOR CRUCIATE LIGAMENT: Primary | ICD-10-CM

## 2018-07-03 PROCEDURE — 97530 THERAPEUTIC ACTIVITIES: CPT | Performed by: PHYSICAL THERAPIST

## 2018-07-03 PROCEDURE — 97110 THERAPEUTIC EXERCISES: CPT | Performed by: PHYSICAL THERAPIST

## 2018-07-03 PROCEDURE — 97014 ELECTRIC STIMULATION THERAPY: CPT | Performed by: PHYSICAL THERAPIST

## 2018-07-03 PROCEDURE — 97112 NEUROMUSCULAR REEDUCATION: CPT | Performed by: PHYSICAL THERAPIST

## 2018-07-03 NOTE — PROGRESS NOTES
Physical Therapy Daily Progress Note        Visit # : 6  Eleni Vaughan reports: My knee is really sore today - walked around more yesterday without any crutch and ran a couple errands     Subjective     Objective       Active Range of Motion   Left Knee   Flexion: 100 degrees with pain     See Exercise, Manual, and Modality Logs for complete treatment.       Assessment & Plan     Assessment  Assessment details: Patient continued to demonstrate improved knee flexion and extension ROM, and quad strength. Deficits persist in decreased ROM, strength, and tolerance to weight-bearing. Continued PT 3x per week for mobility, strength and stabilization to return to full functional activity- next visit attempt hip rotation in side-lying if tolerated.         Progress strengthening /stabilization /functional activity           Manual Therapy:        mins  96149;  Therapeutic Exercise:    25     mins  88706;     Neuromuscular Shlomo:    10    mins  65615;    Therapeutic Activity:     15     mins  97405;     Gait Training:           mins  14531;     Ultrasound:          mins  20174;    Electrical Stimulation:    25     mins  87686 ( );  Dry Needling          mins self-pay    Timed Treatment:   50   mins   Total Treatment:     75   mins    Annetta Julian PT  Physical Therapist  [place] License # [id number]

## 2018-07-05 ENCOUNTER — TREATMENT (OUTPATIENT)
Dept: PHYSICAL THERAPY | Facility: CLINIC | Age: 43
End: 2018-07-05

## 2018-07-05 DIAGNOSIS — Z98.890 S/P RECONSTRUCTION OF ANTERIOR CRUCIATE LIGAMENT: Primary | ICD-10-CM

## 2018-07-05 PROCEDURE — 97110 THERAPEUTIC EXERCISES: CPT | Performed by: PHYSICAL THERAPIST

## 2018-07-05 PROCEDURE — 97530 THERAPEUTIC ACTIVITIES: CPT | Performed by: PHYSICAL THERAPIST

## 2018-07-05 PROCEDURE — 97014 ELECTRIC STIMULATION THERAPY: CPT | Performed by: PHYSICAL THERAPIST

## 2018-07-05 PROCEDURE — 97112 NEUROMUSCULAR REEDUCATION: CPT | Performed by: PHYSICAL THERAPIST

## 2018-07-05 NOTE — PROGRESS NOTES
Physical Therapy Daily Progress Note    Time In 15:31  Time Out 17:02    Visit # : 7  Eleni Vaughan reports: the knee is doing great and is feeling stronger.  Pt states she is starting to sleep better.  Has a little pain when she steps wrong and it feels like her knee hyperextends.      Subjective     Objective       Active Range of Motion   Left Knee   Flexion: 120 degrees      See Exercise, Manual, and Modality Logs for complete treatment.       Assessment & Plan     Assessment  Assessment details: Pt is progressing well with knee AROM and strength.  Pt tolerated new CK strengthening exercises without pain.  Pt ambulating into clinic WBAT with one crutch.  Will continue to see pt 3x/week for ROM, strengthening, gait training, and modalities PRN.          Progress per Plan of Care           Manual Therapy:         mins  87580;  Therapeutic Exercise:   30      mins  10134;     Neuromuscular hSlomo:  10      mins  06550;    Therapeutic Activity:    15      mins  63398;     Gait Training:           mins  83489;     Ultrasound:          mins  58265;    Electrical Stimulation:    15     mins  38741 ( );  Dry Needling          mins self-pay    Timed Treatment:  55    mins   Total Treatment:     91   mins    Cheri Granado, PT  Physical Therapist

## 2018-07-10 ENCOUNTER — TREATMENT (OUTPATIENT)
Dept: PHYSICAL THERAPY | Facility: CLINIC | Age: 43
End: 2018-07-10

## 2018-07-10 DIAGNOSIS — Z98.890 S/P RECONSTRUCTION OF ANTERIOR CRUCIATE LIGAMENT: Primary | ICD-10-CM

## 2018-07-10 PROCEDURE — 97530 THERAPEUTIC ACTIVITIES: CPT | Performed by: PHYSICAL THERAPIST

## 2018-07-10 PROCEDURE — 97112 NEUROMUSCULAR REEDUCATION: CPT | Performed by: PHYSICAL THERAPIST

## 2018-07-10 PROCEDURE — 97110 THERAPEUTIC EXERCISES: CPT | Performed by: PHYSICAL THERAPIST

## 2018-07-10 PROCEDURE — 97014 ELECTRIC STIMULATION THERAPY: CPT | Performed by: PHYSICAL THERAPIST

## 2018-07-10 NOTE — PROGRESS NOTES
Physical Therapy Daily Progress Note    Time In 15:35  Time Out 17:14    Visit # : 8  Eleni Vaughan reports: she pulled her left hamstring last night bending too far forward.  States she is still having a lot of swelling and that makes it feel so tight.      Subjective     Objective       Active Range of Motion   Left Knee   Flexion: 130 degrees   Extension: 0 degrees      See Exercise, Manual, and Modality Logs for complete treatment.     Kinesiotaping L LE 2 Fan strips over upper thigh to proximal ankle for swelling.      Assessment & Plan     Assessment  Assessment details: Pt is progressing well with knee AROM and strength.  Pt tolerated new CK strengthening exercises without pain.  Pt ambulating into clinic WBAT with one crutch, but instructed pt in ambulating with brace without crutch.    Will continue to see pt 3x/week for ROM, strengthening, gait training, and modalities PRN.          Progress per Plan of Care           Manual Therapy:         mins  83736;  Therapeutic Exercise:   35      mins  03158;     Neuromuscular Shlomo:    10    mins  06366;    Therapeutic Activity:    15      mins  99975;     Gait Training:           mins  97214;     Ultrasound:          mins  49992;    Electrical Stimulation:  15       mins  62629 ( );  Dry Needling          mins self-pay    Timed Treatment:  60    mins   Total Treatment:     99   mins    Cheri Granado, PT  Physical Therapist

## 2018-07-11 ENCOUNTER — TREATMENT (OUTPATIENT)
Dept: PHYSICAL THERAPY | Facility: CLINIC | Age: 43
End: 2018-07-11

## 2018-07-11 DIAGNOSIS — Z98.890 S/P RECONSTRUCTION OF ANTERIOR CRUCIATE LIGAMENT: Primary | ICD-10-CM

## 2018-07-11 PROCEDURE — 97014 ELECTRIC STIMULATION THERAPY: CPT | Performed by: PHYSICAL THERAPIST

## 2018-07-11 PROCEDURE — 97110 THERAPEUTIC EXERCISES: CPT | Performed by: PHYSICAL THERAPIST

## 2018-07-11 PROCEDURE — 97112 NEUROMUSCULAR REEDUCATION: CPT | Performed by: PHYSICAL THERAPIST

## 2018-07-11 NOTE — PROGRESS NOTES
Physical Therapy Daily Progress Note    Time In 14:00  Time Out 15:34    Visit # : 9  Eleni Vaughan reports: the tape stayed on pretty well.  Pt denies any increased soreness from yesterday's treatment.      Subjective     Objective       Active Range of Motion   Left Knee   Flexion: 130 degrees   Extension: 0 degrees      See Exercise, Manual, and Modality Logs for complete treatment.     kinesiotaping 2 Fan techniques over left upper thigh to ankle for swelling.        Assessment & Plan     Assessment  Assessment details: Pt is progressing well with knee AROM and strength.  Pt tolerated the increases with strengthening exercises without pain.  Will continue to see pt 3x/week for ROM, strengthening, gait training, and modalities PRN per protocol.          Progress per Plan of Care           Manual Therapy:         mins  57647;  Therapeutic Exercise:  50       mins  70992;     Neuromuscular Shlomo:  10      mins  66464;    Therapeutic Activity:          mins  85840;     Gait Training:           mins  04438;     Ultrasound:          mins  34181;    Electrical Stimulation:    15     mins  89365 ( );  Dry Needling          mins self-pay    Timed Treatment:   60   mins   Total Treatment:     94  mins    Cheri Granado, PT  Physical Therapist

## 2018-07-12 ENCOUNTER — TREATMENT (OUTPATIENT)
Dept: PHYSICAL THERAPY | Facility: CLINIC | Age: 43
End: 2018-07-12

## 2018-07-12 DIAGNOSIS — Z98.890 S/P RECONSTRUCTION OF ANTERIOR CRUCIATE LIGAMENT: Primary | ICD-10-CM

## 2018-07-12 DIAGNOSIS — T84.89XD TEAR OF ANTERIOR CRUCIATE LIGAMENT GRAFT, SUBSEQUENT ENCOUNTER: ICD-10-CM

## 2018-07-12 PROCEDURE — 97112 NEUROMUSCULAR REEDUCATION: CPT | Performed by: PHYSICAL THERAPIST

## 2018-07-12 PROCEDURE — 97110 THERAPEUTIC EXERCISES: CPT | Performed by: PHYSICAL THERAPIST

## 2018-07-12 PROCEDURE — 97014 ELECTRIC STIMULATION THERAPY: CPT | Performed by: PHYSICAL THERAPIST

## 2018-07-12 NOTE — PROGRESS NOTES
Physical Therapy Daily Progress Note    Visit # : 10  Eleni Vaughan reports: having some mild anterior knee pain.    Subjective     Objective   See Exercise, Manual, and Modality Logs for complete treatment.   L knee flex PROM 130 degrees during wall slides  L knee ext 0 degrees with HS stretch/ankle on towel roll    Assessment/Plan  Pt is making excellent progress with ROM and motor control activities.  SLR performed without extensor lag.  Good tolerance to exercise progression.  Progress strengthening /stabilization /functional activity           Manual Therapy:    -     mins  67017;  Therapeutic Exercise:   50      mins  71028;     Neuromuscular Shlomo:    13    mins  60150;    Therapeutic Activity:     -     mins  57844;     Gait Training:      -     mins  74830;     Ultrasound:     -     mins  13363;    Electrical Stimulation:    15 (10)     mins  22388 ( );  Iontophoresis                 -     mins 67355      Timed Treatment:   63   mins   Total Treatment:     90   mins        Magdalena Lyons PT  Physical Therapist  [place] License # [id number]

## 2018-07-16 ENCOUNTER — TREATMENT (OUTPATIENT)
Dept: PHYSICAL THERAPY | Facility: CLINIC | Age: 43
End: 2018-07-16

## 2018-07-16 DIAGNOSIS — Z98.890 S/P RECONSTRUCTION OF ANTERIOR CRUCIATE LIGAMENT: Primary | ICD-10-CM

## 2018-07-16 DIAGNOSIS — T84.89XD TEAR OF ANTERIOR CRUCIATE LIGAMENT GRAFT, SUBSEQUENT ENCOUNTER: ICD-10-CM

## 2018-07-16 PROCEDURE — 97112 NEUROMUSCULAR REEDUCATION: CPT | Performed by: PHYSICAL THERAPIST

## 2018-07-16 PROCEDURE — 97110 THERAPEUTIC EXERCISES: CPT | Performed by: PHYSICAL THERAPIST

## 2018-07-16 PROCEDURE — 97014 ELECTRIC STIMULATION THERAPY: CPT | Performed by: PHYSICAL THERAPIST

## 2018-07-16 PROCEDURE — 97530 THERAPEUTIC ACTIVITIES: CPT | Performed by: PHYSICAL THERAPIST

## 2018-07-16 NOTE — PROGRESS NOTES
Physical Therapy Daily Progress Note        Visit # : 11  Eleni Vaughan reports: Just frustrated with the swelling, causing tightness and stiffness.    Subjective     Objective       Active Range of Motion   Left Knee   Flexion: 133 degrees with pain     See Exercise, Manual, and Modality Logs for complete treatment.       Assessment & Plan     Assessment  Assessment details: Patient continued to demonstrate improved quad strength - no extensor lag with strength activity. Deficits persist in decreased ROM, strength, and tolerance to weight-bearing. Continued PT 3x per week for mobility, strength and stabilization to return to full functional activity- next visit attempt cup walks if tolerated.         Progress strengthening /stabilization /functional activity           Manual Therapy:         mins  72965;  Therapeutic Exercise:    30     mins  71282;     Neuromuscular Shlomo:    10    mins  27792;    Therapeutic Activity:     15     mins  67948;     Gait Training:           mins  71188;     Ultrasound:          mins  84083;    Electrical Stimulation:    15 (10)     mins  36227 ( );  Dry Needling          mins self-pay    Timed Treatment:   55   mins   Total Treatment:     80   mins    Annetta Julian, PT  Physical Therapist  [place] License # [id number]

## 2018-07-17 ENCOUNTER — TREATMENT (OUTPATIENT)
Dept: PHYSICAL THERAPY | Facility: CLINIC | Age: 43
End: 2018-07-17

## 2018-07-17 DIAGNOSIS — Z98.890 S/P RECONSTRUCTION OF ANTERIOR CRUCIATE LIGAMENT: Primary | ICD-10-CM

## 2018-07-17 PROCEDURE — 97014 ELECTRIC STIMULATION THERAPY: CPT | Performed by: PHYSICAL THERAPIST

## 2018-07-17 PROCEDURE — 97112 NEUROMUSCULAR REEDUCATION: CPT | Performed by: PHYSICAL THERAPIST

## 2018-07-17 PROCEDURE — 97110 THERAPEUTIC EXERCISES: CPT | Performed by: PHYSICAL THERAPIST

## 2018-07-17 NOTE — PROGRESS NOTES
Physical Therapy Daily Progress Note    Time In 9:31  Time Out 11:16    Visit # : 12  Eleni Vaughan reports: knee is feeling good. The knee did great over the weekend, still continuing doing her exercises.     Subjective     Objective       Active Range of Motion   Left Knee   Extension: 0 degrees      See Exercise, Manual, and Modality Logs for complete treatment.       Assessment & Plan     Assessment  Assessment details: Pt is progressing well with knee AROM and strength.  Pt ambulated into clinic with knee brace without crutch.  Pt continues to have moderate weakness in quad, but improving in open and closed chain activities and exercises.  Will continue to see pt 3x/week for ROM, strengthening, gait training, and modalities PRN per protocol.          Progress per Plan of Care           Manual Therapy:         mins  89058;  Therapeutic Exercise:    50     mins  87836;     Neuromuscular Shlomo:  15      mins  88019;    Therapeutic Activity:          mins  17794;     Gait Training:           mins  80606;     Ultrasound:          mins  95748;    Electrical Stimulation:    15     mins  29573 ( );  Dry Needling          mins self-pay    Timed Treatment:   65   mins   Total Treatment:     105   mins    Cheri Granado, PT  Physical Therapist

## 2018-07-19 ENCOUNTER — TREATMENT (OUTPATIENT)
Dept: PHYSICAL THERAPY | Facility: CLINIC | Age: 43
End: 2018-07-19

## 2018-07-19 DIAGNOSIS — Z98.890 S/P RECONSTRUCTION OF ANTERIOR CRUCIATE LIGAMENT: Primary | ICD-10-CM

## 2018-07-19 PROCEDURE — 97014 ELECTRIC STIMULATION THERAPY: CPT | Performed by: PHYSICAL THERAPIST

## 2018-07-19 PROCEDURE — 97112 NEUROMUSCULAR REEDUCATION: CPT | Performed by: PHYSICAL THERAPIST

## 2018-07-19 PROCEDURE — 97110 THERAPEUTIC EXERCISES: CPT | Performed by: PHYSICAL THERAPIST

## 2018-07-19 NOTE — PROGRESS NOTES
Physical Therapy Daily Progress Note    Time In 15:00  Time Out 16:50  Visit # : 13  Eleni Vaughan reports: the knee is feeling stronger and she has noticed that the knee does not feel like it is going to buckle anymore.  Pt goes to the MD on Tuesday.      Subjective     Objective       Active Range of Motion   Left Knee   Flexion: 137 degrees      See Exercise, Manual, and Modality Logs for complete treatment.       Assessment & Plan     Assessment  Assessment details: Pt is progressing well with knee AROM and strength.  Pt continues to have moderate weakness in quad, but improving in open and closed chain activities and exercises.  Pt was able to maintain single leg balance today for one minute without LOB and tolerated all increases with strengthening exercises.  Will continue to see pt 3x/week for ROM, strengthening, gait training, and modalities PRN per protocol.          Progress per Plan of Care           Manual Therapy:         mins  95057;  Therapeutic Exercise:    60     mins  97339;     Neuromuscular Shlomo:   15     mins  21621;    Therapeutic Activity:          mins  32627;     Gait Training:           mins  57668;     Ultrasound:          mins  83097;    Electrical Stimulation:   15      mins  39862 ( );  Dry Needling          mins self-pay    Timed Treatment:  75    mins   Total Treatment:     110   mins    Cheri Granado, PT  Physical Therapist

## 2018-07-23 ENCOUNTER — TREATMENT (OUTPATIENT)
Dept: PHYSICAL THERAPY | Facility: CLINIC | Age: 43
End: 2018-07-23

## 2018-07-23 DIAGNOSIS — T84.89XD TEAR OF ANTERIOR CRUCIATE LIGAMENT GRAFT, SUBSEQUENT ENCOUNTER: ICD-10-CM

## 2018-07-23 DIAGNOSIS — Z98.890 S/P RECONSTRUCTION OF ANTERIOR CRUCIATE LIGAMENT: Primary | ICD-10-CM

## 2018-07-23 PROCEDURE — 97530 THERAPEUTIC ACTIVITIES: CPT | Performed by: PHYSICAL THERAPIST

## 2018-07-23 PROCEDURE — 97116 GAIT TRAINING THERAPY: CPT | Performed by: PHYSICAL THERAPIST

## 2018-07-23 PROCEDURE — 97014 ELECTRIC STIMULATION THERAPY: CPT | Performed by: PHYSICAL THERAPIST

## 2018-07-23 PROCEDURE — 97110 THERAPEUTIC EXERCISES: CPT | Performed by: PHYSICAL THERAPIST

## 2018-07-23 PROCEDURE — 97112 NEUROMUSCULAR REEDUCATION: CPT | Performed by: PHYSICAL THERAPIST

## 2018-07-23 NOTE — PROGRESS NOTES
Re-Assessment / Re-Certification        Patient: Eleni Vaughan   : 1975  Diagnosis/ICD-10 Code:  S/P reconstruction of anterior cruciate ligament [Z98.890]  Referring practitioner: Matthew Cooper MD  Date of Initial Visit: 2018  Today's Date: 2018  Patient seen for 14 sessions      Subjective:   Eleni Vaughan reports: Still have tightness and swelling - 0/10 current pain     Clinical Progress: improved  Home Program Compliance: Yes  Treatment has included: therapeutic exercise, neuromuscular re-education, manual therapy, therapeutic activity, gait training, electrical stimulation and cryotherapy    Subjective   Objective       Tenderness   Left Knee   Tenderness in the patellar tendon and tibial tubercle.     Active Range of Motion   Left Knee   Flexion: 135 degrees   Extension: 0 degrees     Strength/Myotome Testing     Left Knee   Left knee flexion strength: 5-/5.  Extension: 4+     Assessment & Plan     Assessment  Assessment details: Patient is progressing with knee AROM and strength activities. Moderate quad weakness persists, and tightness with prolonged positioning. Improving proprioception and tolerance to weight-bearing activity. Cont PT 2-3x per week for strength, and functional stabilization.       Progress toward previous goals: Partially Met    New Goals  Short-term goals (STG): all MET  Long-term goals (LTG):  6-10 weeks  1.  Pt Independent with HEP.  2.  Increase left knee AROM 0-140 degrees   3.  Increase left knee and hip strength to 5/5   4.  Pt ambulates left LE without an antalgic gait.  5.  Pt without left knee effusion.      Recommendations: Continue as planned  Timeframe: 1 month  Prognosis to achieve goals: good    PT Signature: Annetat Julian, PT      Based upon review of the patient's progress and continued therapy plan, it is my medical opinion that Eleni Vaughan should continue physical therapy treatment at [facility]  PHYSICAL THERAPY  [address] [facility] [address]  [phone].    Signature: __________________________________  Matthew Cooper MD    Manual Therapy:         mins  82832;  Therapeutic Exercise:    30     mins  70697;     Neuromuscular Shlomo:    15    mins  94928;    Therapeutic Activity:     15     mins  57532;     Gait Training:      10     mins  83377;     Ultrasound:          mins  71038;    Electrical Stimulation:         mins  63666 ( );  Dry Needling          mins self-pay    Timed Treatment:   70   mins   Total Treatment:     95   mins

## 2018-07-24 ENCOUNTER — TREATMENT (OUTPATIENT)
Dept: PHYSICAL THERAPY | Facility: CLINIC | Age: 43
End: 2018-07-24

## 2018-07-24 ENCOUNTER — OFFICE VISIT (OUTPATIENT)
Dept: ORTHOPEDIC SURGERY | Facility: CLINIC | Age: 43
End: 2018-07-24

## 2018-07-24 VITALS — HEIGHT: 72 IN | WEIGHT: 185 LBS | BODY MASS INDEX: 25.06 KG/M2

## 2018-07-24 DIAGNOSIS — T84.89XD TEAR OF ANTERIOR CRUCIATE LIGAMENT GRAFT, SUBSEQUENT ENCOUNTER: ICD-10-CM

## 2018-07-24 DIAGNOSIS — Z98.890 S/P RECONSTRUCTION OF ANTERIOR CRUCIATE LIGAMENT: Primary | ICD-10-CM

## 2018-07-24 DIAGNOSIS — Z98.890 S/P ACL RECONSTRUCTION: ICD-10-CM

## 2018-07-24 DIAGNOSIS — Z98.890 S/P ARTHROSCOPIC PARTIAL LATERAL MENISCECTOMY: Primary | ICD-10-CM

## 2018-07-24 PROCEDURE — 97112 NEUROMUSCULAR REEDUCATION: CPT | Performed by: PHYSICAL THERAPIST

## 2018-07-24 PROCEDURE — 97116 GAIT TRAINING THERAPY: CPT | Performed by: PHYSICAL THERAPIST

## 2018-07-24 PROCEDURE — 73562 X-RAY EXAM OF KNEE 3: CPT | Performed by: ORTHOPAEDIC SURGERY

## 2018-07-24 PROCEDURE — 99024 POSTOP FOLLOW-UP VISIT: CPT | Performed by: ORTHOPAEDIC SURGERY

## 2018-07-24 PROCEDURE — 97014 ELECTRIC STIMULATION THERAPY: CPT | Performed by: PHYSICAL THERAPIST

## 2018-07-24 PROCEDURE — 97530 THERAPEUTIC ACTIVITIES: CPT | Performed by: PHYSICAL THERAPIST

## 2018-07-24 PROCEDURE — 97110 THERAPEUTIC EXERCISES: CPT | Performed by: PHYSICAL THERAPIST

## 2018-07-24 NOTE — PROGRESS NOTES
Physical Therapy Daily Progress Note        Visit # : 15  Eleni Vaughan reports: Doctor gave me okay to unlock my brace - start doing gentle water exercises - and decrease PT 2x per week     Subjective     Objective       Active Range of Motion   Left Knee   Flexion: 140 degrees     Strength/Myotome Testing     Left Knee   Quadriceps contraction: fair     See Exercise, Manual, and Modality Logs for complete treatment.       Assessment & Plan     Assessment  Assessment details: Discussed with patient fair quad contraction - probably only require Turkmen Stim for quad contraction for another visit or two. Tolerated all strength and stabilization progressions well. Decreased strength, endurance and proprioception persists. Cont PT 2x per week for functional strength and stability.         Progress strengthening /stabilization /functional activity           Manual Therapy:        mins  16821;  Therapeutic Exercise:    25     mins  45544;     Neuromuscular Shlomo:    10    mins  64538;    Therapeutic Activity:     15     mins  68650;     Gait Training:      10     mins  81167;     Ultrasound:          mins  79527;    Electrical Stimulation:    25     mins  73420 ( );  Dry Needling          mins self-pay    Timed Treatment:   60   mins   Total Treatment:     85   mins    Annetta Julian, PT  Physical Therapist  [place] License # [id number]

## 2018-07-26 ENCOUNTER — TREATMENT (OUTPATIENT)
Dept: PHYSICAL THERAPY | Facility: CLINIC | Age: 43
End: 2018-07-26

## 2018-07-26 DIAGNOSIS — Z98.890 S/P RECONSTRUCTION OF ANTERIOR CRUCIATE LIGAMENT: Primary | ICD-10-CM

## 2018-07-26 PROCEDURE — 97014 ELECTRIC STIMULATION THERAPY: CPT | Performed by: PHYSICAL THERAPIST

## 2018-07-26 PROCEDURE — 97112 NEUROMUSCULAR REEDUCATION: CPT | Performed by: PHYSICAL THERAPIST

## 2018-07-26 PROCEDURE — 97110 THERAPEUTIC EXERCISES: CPT | Performed by: PHYSICAL THERAPIST

## 2018-07-26 NOTE — PROGRESS NOTES
Physical Therapy Daily Progress Note    Time In 13:30  Time Out 15:05    Visit # : 16  Eleni Vaughan reports: she is feeling good.  States she has difficulty going down the steps still secondary to not trusting her leg to hold her, so she goes down the steps one at a time.      Subjective     Objective       Active Range of Motion   Left Knee   Flexion: 146 degrees      See Exercise, Manual, and Modality Logs for complete treatment.       Assessment & Plan     Assessment  Assessment details: Pt is progressing well with knee AROM and strength.  Pt continues to have moderate weakness in quad, but improving in open and closed chain activities and exercises.  Pt is having some PF pain during the Nustep, so adjusted seat to prevent PF irritation at TKE until she gains more quad control.  Will see pt 2x/week for ROM, strengthening, gait training, and modalities PRN per protocol.          Progress per Plan of Care           Manual Therapy:         mins  23305;  Therapeutic Exercise:   60      mins  52361;     Neuromuscular Shlomo:  15      mins  04415;    Therapeutic Activity:          mins  73361;     Gait Training:           mins  95047;     Ultrasound:          mins  42322;    Electrical Stimulation:   15      mins  45231 ( );  Dry Needling          mins self-pay    Timed Treatment:  75    mins   Total Treatment:    95    mins    Cheri Granado, PT  Physical Therapist

## 2018-07-27 ENCOUNTER — TELEPHONE (OUTPATIENT)
Dept: ORTHOPEDIC SURGERY | Facility: CLINIC | Age: 43
End: 2018-07-27

## 2018-07-27 ENCOUNTER — OFFICE VISIT (OUTPATIENT)
Dept: ORTHOPEDIC SURGERY | Facility: CLINIC | Age: 43
End: 2018-07-27

## 2018-07-27 DIAGNOSIS — Z98.890 S/P ARTHROSCOPIC PARTIAL LATERAL MENISCECTOMY: Primary | ICD-10-CM

## 2018-07-27 DIAGNOSIS — Z98.890 S/P ACL RECONSTRUCTION: ICD-10-CM

## 2018-07-27 PROCEDURE — 99024 POSTOP FOLLOW-UP VISIT: CPT | Performed by: NURSE PRACTITIONER

## 2018-07-27 NOTE — TELEPHONE ENCOUNTER
Patient left a message stating that one of her incision has become red and itchy, looks like it has slightly opened and has pus coming out of it.  Spoke with Dr. Cooper he said to have her come in and have Lyssa look at it.  I left a message for patient to call back ASAP.

## 2018-07-27 NOTE — PROGRESS NOTES
Subjective:     Patient ID: Eleni Vaughan is a 42 y.o. female.    Chief Complaint:    History of Present Illness         Social History     Occupational History   • Not on file.     Social History Main Topics   • Smoking status: Former Smoker     Years: 5.00     Types: Cigarettes     Quit date: 2008   • Smokeless tobacco: Never Used      Comment: OCCASIONAL   • Alcohol use 3.0 oz/week     5 Glasses of wine per week      Comment: daily   • Drug use: Yes     Types: Marijuana, Cocaine, LSD      Comment: ACID, H/O OCCASIONAL USE  10 YRS AGO   • Sexual activity: Defer      Past Medical History:   Diagnosis Date   • Allergic    • Breast pain in female    • History of chicken pox    • History of heart murmur in childhood    • History of uterine fibroid    • Left knee pain     SCHED SURGERY   • Threatened miscarriage    • Vaginal delivery      Past Surgical History:   Procedure Laterality Date   • DILATATION AND CURETTAGE     • HERNIA REPAIR Right 1989    INGUINAL   • KNEE ACL RECONSTRUCTION Left 6/18/2018    Procedure: REVISION KNEE ANTERIOR CRUCIATE LIGAMENT RECONSTRUCTION WITH HAMSTRING AUTOGRAFT, possible bone patella tendon bone allograft, hardware removal. meniscal and cartilage surgery as indicated;  Surgeon: Matthew Cooper MD;  Location: [facility];  Service: Orthopedics   • KNEE SURGERY Left 1992    ACL recon BTB       Family History   Problem Relation Age of Onset   • No Known Problems Mother    • No Known Problems Father    • No Known Problems Sister    • No Known Problems Brother    • No Known Problems Daughter    • No Known Problems Son    • Cancer Maternal Grandmother    • No Known Problems Paternal Grandmother    • No Known Problems Maternal Aunt    • No Known Problems Paternal Aunt    • BRCA 1/2 Neg Hx    • Breast cancer Neg Hx    • Colon cancer Neg Hx    • Endometrial cancer Neg Hx    • Ovarian cancer Neg Hx    • Malig Hyperthermia Neg Hx          Review of Systems   Musculoskeletal: Positive for  myalgias.           Objective:  There were no vitals filed for this visit.  There were no vitals filed for this visit.  There is no height or weight on file to calculate BMI.     Ortho Exam      Imaging:    Assessment:     No diagnosis found.      Plan:    Orders:  No orders of the defined types were placed in this encounter.      I ordered and reviewed the MANUEL today.     Dictated utilizing Dragon dictation

## 2018-07-30 ENCOUNTER — TREATMENT (OUTPATIENT)
Dept: PHYSICAL THERAPY | Facility: CLINIC | Age: 43
End: 2018-07-30

## 2018-07-30 ENCOUNTER — OFFICE VISIT (OUTPATIENT)
Dept: OBSTETRICS AND GYNECOLOGY | Age: 43
End: 2018-07-30

## 2018-07-30 ENCOUNTER — APPOINTMENT (OUTPATIENT)
Dept: WOMENS IMAGING | Facility: HOSPITAL | Age: 43
End: 2018-07-30

## 2018-07-30 VITALS
DIASTOLIC BLOOD PRESSURE: 80 MMHG | BODY MASS INDEX: 25.6 KG/M2 | WEIGHT: 189 LBS | HEIGHT: 72 IN | SYSTOLIC BLOOD PRESSURE: 118 MMHG

## 2018-07-30 DIAGNOSIS — T84.89XD TEAR OF ANTERIOR CRUCIATE LIGAMENT GRAFT, SUBSEQUENT ENCOUNTER: ICD-10-CM

## 2018-07-30 DIAGNOSIS — Z98.890 S/P RECONSTRUCTION OF ANTERIOR CRUCIATE LIGAMENT: Primary | ICD-10-CM

## 2018-07-30 DIAGNOSIS — Z01.419 ENCOUNTER FOR GYNECOLOGICAL EXAMINATION WITHOUT ABNORMAL FINDING: Primary | ICD-10-CM

## 2018-07-30 DIAGNOSIS — Z98.890 S/P ACL RECONSTRUCTION: ICD-10-CM

## 2018-07-30 DIAGNOSIS — Z98.890 S/P ARTHROSCOPIC PARTIAL LATERAL MENISCECTOMY: ICD-10-CM

## 2018-07-30 PROCEDURE — 97112 NEUROMUSCULAR REEDUCATION: CPT | Performed by: PHYSICAL THERAPIST

## 2018-07-30 PROCEDURE — 97014 ELECTRIC STIMULATION THERAPY: CPT | Performed by: PHYSICAL THERAPIST

## 2018-07-30 PROCEDURE — 97116 GAIT TRAINING THERAPY: CPT | Performed by: PHYSICAL THERAPIST

## 2018-07-30 PROCEDURE — 77067 SCR MAMMO BI INCL CAD: CPT | Performed by: RADIOLOGY

## 2018-07-30 PROCEDURE — 97530 THERAPEUTIC ACTIVITIES: CPT | Performed by: PHYSICAL THERAPIST

## 2018-07-30 PROCEDURE — 99396 PREV VISIT EST AGE 40-64: CPT | Performed by: OBSTETRICS & GYNECOLOGY

## 2018-07-30 PROCEDURE — 97110 THERAPEUTIC EXERCISES: CPT | Performed by: PHYSICAL THERAPIST

## 2018-07-30 RX ORDER — IBUPROFEN 800 MG/1
800 TABLET ORAL EVERY 6 HOURS PRN
COMMUNITY
End: 2020-04-03

## 2018-07-30 NOTE — PROGRESS NOTES
Routine Annual Visit    2018    Patient: Eleni Vaughan          MR#:[id number]      Chief Complaint   Patient presents with   • Annual Exam     PT HERE FOR ROUTINE AE AND MG. SHE IS WELL. LAST PAP 2017 (NEG AND NEG HPV).  HAS VAS.       History of Present Illness    42 y.o. female  who presents for annual exam.   Menses fairly regular  Works from home  Had knee surgery - recovering  mammo today  PAP UTD  VAS  Kids 4,6  Moved to [place]          Patient's last menstrual period was 2018 (exact date).  Obstetric History:  OB History      Para Term  AB Living    4 2 2   2 2    SAB TAB Ectopic Molar Multiple Live Births    2         2         Menstrual History:     Patient's last menstrual period was 2018 (exact date).       Sexual History:       ________________________________________  Patient Active Problem List   Diagnosis   • S/P arthroscopic partial lateral meniscectomy   • S/P ACL reconstruction       Past Medical History:   Diagnosis Date   • Allergic    • Breast pain in female    • History of chicken pox    • History of heart murmur in childhood    • History of uterine fibroid    • Left knee pain     SCHED SURGERY   • Threatened miscarriage    • Vaginal delivery        Past Surgical History:   Procedure Laterality Date   • DILATATION AND CURETTAGE     • HERNIA REPAIR Right     INGUINAL   • KNEE ACL RECONSTRUCTION Left 2018    Procedure: REVISION KNEE ANTERIOR CRUCIATE LIGAMENT RECONSTRUCTION WITH HAMSTRING AUTOGRAFT, possible bone patella tendon bone allograft, hardware removal. meniscal and cartilage surgery as indicated;  Surgeon: Matthew Cooper MD;  Location: [facility];  Service: Orthopedics   • KNEE SURGERY Left     ACL recon BTB       History   Smoking Status   • Former Smoker   • Years: 5.00   • Types: Cigarettes   • Quit date:    Smokeless Tobacco   • Never Used     Comment: OCCASIONAL       has a current medication list which includes  "the following prescription(s): ibuprofen and unable to find.  ________________________________________    Current contraception: vasectomy  History of abnormal Pap smear: no  Family history of Breast cancer: no  Family history of uterine or ovarian cancer: no  Family History of colon cancer/colon polyps: no  History of abnormal mammogram: no      The following portions of the patient's history were reviewed and updated as appropriate: allergies, current medications, past family history, past medical history, past social history, past surgical history and problem list.    Review of Systems    Pertinent items are noted in HPI.     Objective   Physical Exam    /80   Ht 182.9 cm (72\")   Wt 85.7 kg (189 lb)   LMP 07/24/2018 (Exact Date)   BMI 25.63 kg/m²    BP Readings from Last 3 Encounters:   07/30/18 118/80   06/18/18 106/62   06/01/18 104/70      Wt Readings from Last 3 Encounters:   07/30/18 85.7 kg (189 lb)   07/24/18 83.9 kg (185 lb)   06/18/18 85.7 kg (189 lb)      BMI: Estimated body mass index is 25.63 kg/m² as calculated from the following:    Height as of this encounter: 182.9 cm (72\").    Weight as of this encounter: 85.7 kg (189 lb).      General:   alert, appears stated age and cooperative   Abdomen: soft, non-tender, without masses or organomegaly   Breast: inspection negative, no nipple discharge or bleeding, no masses or nodularity palpable   Vulva: normal   Vagina: normal mucosa   Cervix: no cervical motion tenderness and no lesions   Uterus: normal size, mobile or non-tender   Adnexa: no mass, fullness, tenderness     Assessment:    1. Normal annual exam   Assessment     ICD-10-CM ICD-9-CM   1. Encounter for gynecological examination without abnormal finding Z01.419 V72.31     Plan:    Plan     [x]  Mammogram request made  []  PAP done  []  Labs:   []  GC/Chl/TV  []  DEXA scan   []  Referral for colonoscopy:       Eleni was seen today for annual exam.    Diagnoses and all orders for this " visit:    Encounter for gynecological examination without abnormal finding            Counseling:  --Nutrition: Stressed importance of moderation and caloric balance, stressed fresh fruit and vegetables  --Exercise: Stressed the importance of regular exercise. 3-5 times weekly   - Discussed screening mammogram recommendations.   --Discussed benefits of screening colonoscopy- age 45 unless FH  --Discussed pap smear screening recommendations

## 2018-07-30 NOTE — PROGRESS NOTES
Physical Therapy Daily Progress Note        Visit # : 17  Eleni Vaughan reports: My knee is still so tight and swollen - think I would like to try the tape again today     Subjective     Objective       Active Range of Motion   Left Knee   Flexion: 146 degrees with pain  Extension: 0 degrees with pain     See Exercise, Manual, and Modality Logs for complete treatment.       Assessment & Plan     Assessment  Assessment details: Discussed with patient ways to continue to decrease swelling, K Tape, ice, and elevation. Tolerated all strength and stabilization progressions well. Decreased strength, endurance and proprioception persists. Pain with lateral step down activity in quad tendon - held exercise. Cont PT 2x per week for functional strength and stability.         Progress per Plan of Care           Manual Therapy:         mins  71162;  Therapeutic Exercise:    25     mins  66966;     Neuromuscular Shlomo:    10    mins  02923;    Therapeutic Activity:     15     mins  40505;     Gait Training:      10     mins  45632;     Ultrasound:          mins  27364;    Electrical Stimulation:    25     mins  18769 ( );  Dry Needling          mins self-pay    Timed Treatment:   60   mins   Total Treatment:     85   mins    Annetta Julian, PT  Physical Therapist  [place] License # [id number]

## 2018-07-30 NOTE — PROGRESS NOTES
CC: Status post left knee ACL reconstruction with hamstring autograft and allograft supplement, partial lateral meniscectomy, DOS 6/18/2018    Interval history: Ms. Vaughan presents back to clinic with concerns of fluid draining from portal site. She last saw Dr. Cooper on 07/24/2018 for follow-up and was outdoors, scratched site and began noticing drainage from portal site as well as some redness. Denies fevers, sweats, chills. Does report that the drainage has decreased at this time however is concerned. Denies all other concerns at this time.     Physical exam:               Left knee:              Incisions- clean dry, and intact, healing well              mild effusion                    ROM- 0 - 130 degrees   Minimal erythema portal sites              Strength- 4 of 5              stable to testing              Positive sensation light touch               Brisk cap refill    Impression: Status post left knee ACL reconstruction, PARTIAL lateral meniscectomy    Plan:  1.  Instructed patient to avoid applying any topical lotions or creams to this site until it closes.  Encouraged to draw cervical around the erythema make sure it's not going beyond the margins if so encouraged to call immediately.  We'll plan to see her back if symptoms are getting worse.  Patient verbalized understanding of all information agrees with plan of care.  Denies other concerns present this time.

## 2018-07-31 ENCOUNTER — TELEPHONE (OUTPATIENT)
Dept: MAMMOGRAPHY | Age: 43
End: 2018-07-31

## 2018-07-31 ENCOUNTER — TREATMENT (OUTPATIENT)
Dept: PHYSICAL THERAPY | Facility: CLINIC | Age: 43
End: 2018-07-31

## 2018-07-31 DIAGNOSIS — Z98.890 S/P RECONSTRUCTION OF ANTERIOR CRUCIATE LIGAMENT: Primary | ICD-10-CM

## 2018-07-31 DIAGNOSIS — T84.89XD TEAR OF ANTERIOR CRUCIATE LIGAMENT GRAFT, SUBSEQUENT ENCOUNTER: ICD-10-CM

## 2018-07-31 DIAGNOSIS — Z98.890 S/P ARTHROSCOPIC PARTIAL LATERAL MENISCECTOMY: ICD-10-CM

## 2018-07-31 DIAGNOSIS — Z98.890 S/P ACL RECONSTRUCTION: ICD-10-CM

## 2018-07-31 PROCEDURE — 97116 GAIT TRAINING THERAPY: CPT | Performed by: PHYSICAL THERAPIST

## 2018-07-31 PROCEDURE — 97530 THERAPEUTIC ACTIVITIES: CPT | Performed by: PHYSICAL THERAPIST

## 2018-07-31 PROCEDURE — 97110 THERAPEUTIC EXERCISES: CPT | Performed by: PHYSICAL THERAPIST

## 2018-07-31 PROCEDURE — 97112 NEUROMUSCULAR REEDUCATION: CPT | Performed by: PHYSICAL THERAPIST

## 2018-07-31 PROCEDURE — 97014 ELECTRIC STIMULATION THERAPY: CPT | Performed by: PHYSICAL THERAPIST

## 2018-07-31 NOTE — PROGRESS NOTES
"Physical Therapy Daily Progress Note        Visit # : 18  Eleni Vaughan reports: Driving to [place] today - knee still feels a little achy - have been more active and wearing brace less     Subjective     Objective       Observations   Left Knee   Positive for edema.     Additional Observation Details  Antalgic gait persists - decreased left LE heel strike and stance itme     Tenderness   Left Knee   Tenderness in the lateral joint line, medial joint line, patellar tendon and quadriceps tendon.      See Exercise, Manual, and Modality Logs for complete treatment.       Assessment & Plan     Assessment  Assessment details: Patient c/o consistent ache in knee pain, tender lateral and medial joint lines, and decreased HS strength persists Tolerated all strength and stabilization progressions well. Able to complete lateral step down activity without increased quad tendon pain. Cont PT 2x per week for functional strength and stability- next visit attempt retro TM and/or 4\" step up and over.         Progress strengthening /stabilization /functional activity           Manual Therapy:         mins  30090;  Therapeutic Exercise:    30     mins  11247;     Neuromuscular Shlomo:    10    mins  84130;    Therapeutic Activity:     10     mins  04911;     Gait Training:      15     mins  75162;     Ultrasound:          mins  36152;    Electrical Stimulation:    25     mins  26491 ( );  Dry Needling          mins self-pay    Timed Treatment:   65   mins   Total Treatment:     90   mins    Annetta Julian, PT  Physical Therapist  [place] License # [id number]  "

## 2018-07-31 NOTE — TELEPHONE ENCOUNTER
Informed pt her mammogram was abnormal .  Recommends LT spot and ultrasound of the breast. Patient is scheduled at [facility] on 8.3.18@830.   Order in [facility].

## 2018-07-31 NOTE — TELEPHONE ENCOUNTER
Dr Polanco,    Please see Nubia regarding this.  The fax que has been taken away from [facility] medical records and [facility] has a group that scan in results.

## 2018-08-06 ENCOUNTER — TELEPHONE (OUTPATIENT)
Dept: MAMMOGRAPHY | Age: 43
End: 2018-08-06

## 2018-08-06 ENCOUNTER — TREATMENT (OUTPATIENT)
Dept: PHYSICAL THERAPY | Facility: CLINIC | Age: 43
End: 2018-08-06

## 2018-08-06 DIAGNOSIS — T84.89XD TEAR OF ANTERIOR CRUCIATE LIGAMENT GRAFT, SUBSEQUENT ENCOUNTER: ICD-10-CM

## 2018-08-06 DIAGNOSIS — Z98.890 S/P RECONSTRUCTION OF ANTERIOR CRUCIATE LIGAMENT: Primary | ICD-10-CM

## 2018-08-06 DIAGNOSIS — Z98.890 S/P ARTHROSCOPIC PARTIAL LATERAL MENISCECTOMY: ICD-10-CM

## 2018-08-06 DIAGNOSIS — Z98.890 S/P ACL RECONSTRUCTION: ICD-10-CM

## 2018-08-06 PROCEDURE — 97116 GAIT TRAINING THERAPY: CPT | Performed by: PHYSICAL THERAPIST

## 2018-08-06 PROCEDURE — 97112 NEUROMUSCULAR REEDUCATION: CPT | Performed by: PHYSICAL THERAPIST

## 2018-08-06 PROCEDURE — 97530 THERAPEUTIC ACTIVITIES: CPT | Performed by: PHYSICAL THERAPIST

## 2018-08-06 PROCEDURE — 97110 THERAPEUTIC EXERCISES: CPT | Performed by: PHYSICAL THERAPIST

## 2018-08-06 PROCEDURE — 97014 ELECTRIC STIMULATION THERAPY: CPT | Performed by: PHYSICAL THERAPIST

## 2018-08-06 NOTE — TELEPHONE ENCOUNTER
Informed pt she needed to reschedule appt at [facility] for follow up appt.  Requested [facility] number to schedule herself.    Will call [facility] to check on appt.

## 2018-08-06 NOTE — PROGRESS NOTES
"Physical Therapy Daily Progress Note        Visit # : 19  Eleni Vaughan reports: My knee felt really good during our trip - lots of walking - did okay in the car     Subjective     Objective       Active Range of Motion   Left Knee   Flexion: 148 degrees with pain     See Exercise, Manual, and Modality Logs for complete treatment.       Assessment & Plan     Assessment  Assessment details: Patient c/o consistent ache in knee pain, tender lateral and medial joint lines, and decreased HS strength persists Tolerated all strength and stabilization progressions well. Able to complete lateral step down activity without increased quad tendon pain. Cont PT 2x per week for functional strength and stability- next visit attempt retro TM and/or 4\" step up and over.         Progress strengthening /stabilization /functional activity           Manual Therapy:         mins  31680;  Therapeutic Exercise:    25      mins  76351;     Neuromuscular Shlomo:    15    mins  17250;    Therapeutic Activity:     15     mins  54618;     Gait Training:      10     mins  24705;     Ultrasound:          mins  24294;    Electrical Stimulation:    25     mins  60989 ( );  Dry Needling          mins self-pay    Timed Treatment:   65   mins   Total Treatment:     90   mins    Annetta Julian PT  Physical Therapist  [place] License # [id number]  "

## 2018-08-07 ENCOUNTER — APPOINTMENT (OUTPATIENT)
Dept: WOMENS IMAGING | Facility: HOSPITAL | Age: 43
End: 2018-08-07

## 2018-08-07 PROCEDURE — 77065 DX MAMMO INCL CAD UNI: CPT | Performed by: RADIOLOGY

## 2018-08-07 PROCEDURE — 76641 ULTRASOUND BREAST COMPLETE: CPT | Performed by: RADIOLOGY

## 2018-08-09 ENCOUNTER — TREATMENT (OUTPATIENT)
Dept: PHYSICAL THERAPY | Facility: CLINIC | Age: 43
End: 2018-08-09

## 2018-08-09 DIAGNOSIS — Z98.890 S/P RECONSTRUCTION OF ANTERIOR CRUCIATE LIGAMENT: Primary | ICD-10-CM

## 2018-08-09 PROCEDURE — 97014 ELECTRIC STIMULATION THERAPY: CPT | Performed by: PHYSICAL THERAPIST

## 2018-08-09 PROCEDURE — 97110 THERAPEUTIC EXERCISES: CPT | Performed by: PHYSICAL THERAPIST

## 2018-08-09 PROCEDURE — 97112 NEUROMUSCULAR REEDUCATION: CPT | Performed by: PHYSICAL THERAPIST

## 2018-08-09 NOTE — PROGRESS NOTES
Physical Therapy Daily Progress Note    Time In 13:05  Time Out 14:49    Visit # : 20  Eleni Vaughan reports: her knee has been good.    Subjective     Objective       Active Range of Motion   Left Knee   Flexion: 147 degrees      See Exercise, Manual, and Modality Logs for complete treatment.     Left knee quad lag with SLR 1 degree  LAQ 3 degrees       Assessment & Plan     Assessment  Assessment details: Pt is doing well with all exercises and continues to show improvements in strength.  Pt tolerated the increases with strengthening and proprioception exercises well.  Will continue progressing pt's strength, endurance, and proprioception per protocol.          Progress per Plan of Care           Manual Therapy:         mins  67564;  Therapeutic Exercise:   60      mins  61111;     Neuromuscular Shlomo:  15      mins  72336;    Therapeutic Activity:          mins  44068;     Gait Training:           mins  23664;     Ultrasound:          mins  89696;    Electrical Stimulation:   15      mins  92482 ( );  Dry Needling          mins self-pay    Timed Treatment:  75    mins   Total Treatment:    104    mins    Cheri Granado, PT  Physical Therapist

## 2018-08-13 ENCOUNTER — TREATMENT (OUTPATIENT)
Dept: PHYSICAL THERAPY | Facility: CLINIC | Age: 43
End: 2018-08-13

## 2018-08-13 ENCOUNTER — OFFICE VISIT (OUTPATIENT)
Dept: ORTHOPEDIC SURGERY | Facility: CLINIC | Age: 43
End: 2018-08-13

## 2018-08-13 ENCOUNTER — TELEPHONE (OUTPATIENT)
Dept: ORTHOPEDIC SURGERY | Facility: CLINIC | Age: 43
End: 2018-08-13

## 2018-08-13 DIAGNOSIS — Z98.890 S/P RECONSTRUCTION OF ANTERIOR CRUCIATE LIGAMENT: Primary | ICD-10-CM

## 2018-08-13 DIAGNOSIS — Z98.890 S/P ARTHROSCOPIC PARTIAL LATERAL MENISCECTOMY: ICD-10-CM

## 2018-08-13 DIAGNOSIS — Z98.890 S/P ACL RECONSTRUCTION: Primary | ICD-10-CM

## 2018-08-13 DIAGNOSIS — T84.89XD TEAR OF ANTERIOR CRUCIATE LIGAMENT GRAFT, SUBSEQUENT ENCOUNTER: ICD-10-CM

## 2018-08-13 DIAGNOSIS — Z98.890 S/P ACL RECONSTRUCTION: ICD-10-CM

## 2018-08-13 PROCEDURE — 97116 GAIT TRAINING THERAPY: CPT | Performed by: PHYSICAL THERAPIST

## 2018-08-13 PROCEDURE — 97014 ELECTRIC STIMULATION THERAPY: CPT | Performed by: PHYSICAL THERAPIST

## 2018-08-13 PROCEDURE — 97110 THERAPEUTIC EXERCISES: CPT | Performed by: PHYSICAL THERAPIST

## 2018-08-13 PROCEDURE — 99024 POSTOP FOLLOW-UP VISIT: CPT | Performed by: ORTHOPAEDIC SURGERY

## 2018-08-13 PROCEDURE — 97530 THERAPEUTIC ACTIVITIES: CPT | Performed by: PHYSICAL THERAPIST

## 2018-08-13 PROCEDURE — 97112 NEUROMUSCULAR REEDUCATION: CPT | Performed by: PHYSICAL THERAPIST

## 2018-08-13 RX ORDER — DOXYCYCLINE HYCLATE 100 MG/1
100 TABLET, DELAYED RELEASE ORAL 2 TIMES DAILY
Qty: 14 TABLET | Refills: 0 | Status: SHIPPED | OUTPATIENT
Start: 2018-08-13 | End: 2018-08-20

## 2018-08-13 NOTE — TELEPHONE ENCOUNTER
Eleni called this morning because she is still having some drainage out of two of her incision sites.  Says that there isn't any increase in redness or heat, denies any fever or chills.  She saw you for this on 7/24.  Asking if she should be put on antibiotic as precaution or if there is anything else she should do.

## 2018-08-13 NOTE — PROGRESS NOTES
CC:Status post left knee ACL reconstruction with hamstring autograft and allograft supplement, partial lateral meniscectomy, DOS 6/18/2018    Interval history: Patient returns to clinic today, 8 weeks from surgery, doing well with physical therapy in regards to strengthening, range of motion, and return to normal gait.  She did come in today because she has had some drainage on one episode from her lateral portal with a small purulent region of drainage,  a similar episode over medial portal.  That is scabbed over now.  Denies any redness swelling and warmth or fevers chills or sweats.  Denies any locking, catching, or giving way of left knee.  Has continued to wear the brace as instructed.    Physical exam:              Left knee:   Incisions- clean dry, and intact, healing well.  Lateral portal was small opening from what appears to be a recent extrusion of stitch abscess, no surrounding erythema, no lower extremity sore subcutaneous fluid collection   Effusion minimal      ROM- 0 - 145 degrees   Strength- 4 of 5   stable to testing   Positive sensation light touch    Brisk cap refill    Impression: Status post left knee ACL reconstruction, PARTIAL lateral meniscectomy    Plan:  1.  Discussed with patient that she can discontinue her brace at this point in time and transition to knee sleeve.  Given the fact that she is now had a stitch abscess from each portal site, I will place her on a seven-day course of doxycycline, otherwise her wound appears benign.  Recommended cleaning daily with 50% peroxide and 50% water until the wound closes, keeping covered went out, and no submerging in water until wound is completely closed.  Follow-up in 2 months for repeat assessment.  Continue to progress therapy as tolerated.  Patient is happy with her progress at this time.

## 2018-08-13 NOTE — PROGRESS NOTES
Physical Therapy Daily Progress Note        Visit # : 21  Eleni Vaughan reports: Doctor released me from brace - said I can start light jogging and progressing work-out/ strengthening     Subjective     Objective       Observations     Additional Observation Details  Decreased left LE proprioception and endurance.      See Exercise, Manual, and Modality Logs for complete treatment.       Assessment & Plan     Assessment  Assessment details: Patient presents ambulating without brace. Tolerated all strength and stabilization progressions well. Able to complete functional strength progressions with visible fatigue. Cont PT 2x per week for functional strength and stability- next visit attempt light jogging and/or unilateral leg press if tolerated.         Progress strengthening /stabilization /functional activity           Manual Therapy:         mins  89322;  Therapeutic Exercise:    25     mins  68563;     Neuromuscular Shlomo:    10    mins  03743;    Therapeutic Activity:     15     mins  70352;     Gait Training:      15     mins  80976;     Ultrasound:          mins  13179;    Electrical Stimulation:    25     mins  13832 ( );  Dry Needling          mins self-pay    Timed Treatment:   65   mins   Total Treatment:     90   mins    Annetta Julian, PT  Physical Therapist  [place] License # [id number]

## 2018-08-15 ENCOUNTER — TELEPHONE (OUTPATIENT)
Dept: MAMMOGRAPHY | Age: 43
End: 2018-08-15

## 2018-08-15 NOTE — TELEPHONE ENCOUNTER
Informed pt her follow up mammogram recommends LT ultrasound guided vacuum assisted biopsy  of the breast. Patient is scheduled at [facility] on 8.16.18@1.  Order in [facility].

## 2018-08-16 ENCOUNTER — TREATMENT (OUTPATIENT)
Dept: PHYSICAL THERAPY | Facility: CLINIC | Age: 43
End: 2018-08-16

## 2018-08-16 ENCOUNTER — APPOINTMENT (OUTPATIENT)
Dept: WOMENS IMAGING | Facility: HOSPITAL | Age: 43
End: 2018-08-16

## 2018-08-16 DIAGNOSIS — Z98.890 S/P ACL RECONSTRUCTION: ICD-10-CM

## 2018-08-16 DIAGNOSIS — T84.89XD TEAR OF ANTERIOR CRUCIATE LIGAMENT GRAFT, SUBSEQUENT ENCOUNTER: ICD-10-CM

## 2018-08-16 DIAGNOSIS — Z98.890 S/P RECONSTRUCTION OF ANTERIOR CRUCIATE LIGAMENT: Primary | ICD-10-CM

## 2018-08-16 DIAGNOSIS — Z98.890 S/P ARTHROSCOPIC PARTIAL LATERAL MENISCECTOMY: ICD-10-CM

## 2018-08-16 PROCEDURE — 19083 BX BREAST 1ST LESION US IMAG: CPT | Performed by: RADIOLOGY

## 2018-08-16 PROCEDURE — 97014 ELECTRIC STIMULATION THERAPY: CPT | Performed by: PHYSICAL THERAPIST

## 2018-08-16 PROCEDURE — 97110 THERAPEUTIC EXERCISES: CPT | Performed by: PHYSICAL THERAPIST

## 2018-08-16 PROCEDURE — 97112 NEUROMUSCULAR REEDUCATION: CPT | Performed by: PHYSICAL THERAPIST

## 2018-08-16 PROCEDURE — 97530 THERAPEUTIC ACTIVITIES: CPT | Performed by: PHYSICAL THERAPIST

## 2018-08-16 NOTE — PROGRESS NOTES
Physical Therapy Daily Progress Note        Visit # : 22  Eleni Vaughan reports: I was a little sore after last visit when we increased a lot of exercise - feel great today no problems    Subjective     Objective       Strength/Myotome Testing     Left Knee   Left knee flexion strength: 5-/5.  Left knee extension strength: 5-/5.     See Exercise, Manual, and Modality Logs for complete treatment.       Assessment & Plan     Assessment  Assessment details: Patient presents with improved heel-toe gait mechanics. Tolerated all strength and stabilization progressions well. Visible fatigue persists with strength progressions. Cont PT 2x per week for functional strength and stability- next visit attempt bousa marches and/or unilateral leg press if tolerated.         Progress strengthening /stabilization /functional activity           Manual Therapy:         mins  83967;  Therapeutic Exercise:    25     mins  07419;     Neuromuscular Shlomo:    10    mins  28395;    Therapeutic Activity:     10     mins  51171;     Gait Training:           mins  72751;     Ultrasound:          mins  93535;    Electrical Stimulation:    25     mins  24458 ( );  Dry Needling          mins self-pay    Timed Treatment:   45   mins   Total Treatment:     70   mins    Annetta Julian PT  Physical Therapist  [place] License # [id number]

## 2018-08-20 ENCOUNTER — TREATMENT (OUTPATIENT)
Dept: PHYSICAL THERAPY | Facility: CLINIC | Age: 43
End: 2018-08-20

## 2018-08-20 DIAGNOSIS — Z98.890 S/P RECONSTRUCTION OF ANTERIOR CRUCIATE LIGAMENT: Primary | ICD-10-CM

## 2018-08-20 PROCEDURE — 97112 NEUROMUSCULAR REEDUCATION: CPT | Performed by: PHYSICAL THERAPIST

## 2018-08-20 PROCEDURE — 97014 ELECTRIC STIMULATION THERAPY: CPT | Performed by: PHYSICAL THERAPIST

## 2018-08-20 PROCEDURE — 97530 THERAPEUTIC ACTIVITIES: CPT | Performed by: PHYSICAL THERAPIST

## 2018-08-20 PROCEDURE — 97110 THERAPEUTIC EXERCISES: CPT | Performed by: PHYSICAL THERAPIST

## 2018-08-20 NOTE — PROGRESS NOTES
Physical Therapy Daily Progress Note    Time In 8:37  Time Out 10:17    Visit # : 23  Eleni Vaughan reports: she has been doing good and denies having any increased soreness since last visit.      Subjective     Objective   See Exercise, Manual, and Modality Logs for complete treatment.       Assessment & Plan     Assessment  Assessment details: Pt is doing well with all exercises and continues to show improvements in strength.  Pt tolerated the increases with strengthening and proprioception exercises well, but still remains weak in quad and hip.  Will continue progressing pt's strength, endurance, and proprioception per protocol.          Progress per Plan of Care           Manual Therapy:         mins  14258;  Therapeutic Exercise:   50      mins  78470;     Neuromuscular Shlomo:  15      mins  12052;    Therapeutic Activity:    10      mins  75996;     Gait Training:           mins  10849;     Ultrasound:          mins  49758;    Electrical Stimulation:   15      mins  20391 ( );  Dry Needling          mins self-pay    Timed Treatment:   75   mins   Total Treatment:     100   mins    Cheri Granado, PT  Physical Therapist

## 2018-08-23 ENCOUNTER — TREATMENT (OUTPATIENT)
Dept: PHYSICAL THERAPY | Facility: CLINIC | Age: 43
End: 2018-08-23

## 2018-08-23 DIAGNOSIS — Z98.890 S/P ACL RECONSTRUCTION: ICD-10-CM

## 2018-08-23 DIAGNOSIS — Z98.890 S/P ARTHROSCOPIC PARTIAL LATERAL MENISCECTOMY: ICD-10-CM

## 2018-08-23 DIAGNOSIS — Z98.890 S/P RECONSTRUCTION OF ANTERIOR CRUCIATE LIGAMENT: Primary | ICD-10-CM

## 2018-08-23 DIAGNOSIS — T84.89XD TEAR OF ANTERIOR CRUCIATE LIGAMENT GRAFT, SUBSEQUENT ENCOUNTER: ICD-10-CM

## 2018-08-23 PROCEDURE — 97110 THERAPEUTIC EXERCISES: CPT | Performed by: PHYSICAL THERAPIST

## 2018-08-23 PROCEDURE — 97112 NEUROMUSCULAR REEDUCATION: CPT | Performed by: PHYSICAL THERAPIST

## 2018-08-23 PROCEDURE — 97530 THERAPEUTIC ACTIVITIES: CPT | Performed by: PHYSICAL THERAPIST

## 2018-08-23 PROCEDURE — 97014 ELECTRIC STIMULATION THERAPY: CPT | Performed by: PHYSICAL THERAPIST

## 2018-08-23 NOTE — PROGRESS NOTES
Physical Therapy Daily Progress Note        Visit # : 24  Eleni Vaughan reports: Knee is a little more achy this morning - I felt a small pop yesterday running down the stairs - no residual pain or swelling     Subjective     Objective       Strength/Myotome Testing     Left Knee   Flexion: 4+  Left knee extension strength: 5-/5.     See Exercise, Manual, and Modality Logs for complete treatment.       Assessment & Plan     Assessment  Assessment details: Patient presents with improved proprioception and strength.Visible fatigue persists with strength progressions. Slight discomfort with jump rope attempts today. Cont PT 2x per week for functional strength and stability- next visit attempt lateral hops and/or lunges if tolerated.         Progress strengthening /stabilization /functional activity           Manual Therapy:         mins  65236;  Therapeutic Exercise:    25     mins  78410;     Neuromuscular Shlomo:    10    mins  05595;    Therapeutic Activity:     15     mins  40129;     Gait Training:           mins  38549;     Ultrasound:          mins  53728;    Electrical Stimulation:    25     mins  72808 ( );  Dry Needling          mins self-pay    Timed Treatment:   50   mins   Total Treatment:     75  mins    Annetta Julian PT  Physical Therapist  [place] License # [id number]

## 2018-08-27 ENCOUNTER — TREATMENT (OUTPATIENT)
Dept: PHYSICAL THERAPY | Facility: CLINIC | Age: 43
End: 2018-08-27

## 2018-08-27 DIAGNOSIS — Z98.890 S/P RECONSTRUCTION OF ANTERIOR CRUCIATE LIGAMENT: Primary | ICD-10-CM

## 2018-08-27 PROCEDURE — 97014 ELECTRIC STIMULATION THERAPY: CPT | Performed by: PHYSICAL THERAPIST

## 2018-08-27 PROCEDURE — 97112 NEUROMUSCULAR REEDUCATION: CPT | Performed by: PHYSICAL THERAPIST

## 2018-08-27 PROCEDURE — 97110 THERAPEUTIC EXERCISES: CPT | Performed by: PHYSICAL THERAPIST

## 2018-08-27 NOTE — PROGRESS NOTES
"Physical Therapy Daily Progress Note    Time In 9:33  Time Out 11:14    Visit # : 25  Eleni Vaughan reports: her knee is more \"achy\" this weekend after hurting it last week.  States it is aching under the knee cap, so I'm scared I may have done something to it last week?\"      Subjective     Objective       Observations     Additional Observation Details  No increase in left knee effusion by gross observation     Tests     Left Knee   Negative anterior drawer, anterior Lachman, valgus stress test at 0 degrees, valgus stress test at 30 degrees, varus stress test at 0 degrees and varus stress test at 30 degrees.     Right Knee   Negative anterior drawer, anterior Lachman, valgus stress test at 0 degrees, valgus stress test at 30 degrees, varus stress test at 0 degrees and varus stress test at 30 degrees.      See Exercise, Manual, and Modality Logs for complete treatment.       Assessment & Plan     Assessment  Assessment details: Pt is doing well with all exercises and continues to show improvements in strength.  Pt with negative drawer and stress test on left knee.  Pt was able to perform all strengthening exercises without increased pain.  Feel pt's discomfort is coming from PF pain from quad and hip weakness.  Will continue progressing pt's strength, endurance, and proprioception per protocol.          Progress per Plan of Care           Manual Therapy:         mins  68438;  Therapeutic Exercise:   60      mins  11589;     Neuromuscular Shlomo:  15      mins  55481;    Therapeutic Activity:          mins  78924;     Gait Training:           mins  35111;     Ultrasound:          mins  11722;    Electrical Stimulation:   15      mins  09324 ( );  Dry Needling          mins self-pay    Timed Treatment:  75    mins   Total Treatment:     101   mins    Cheri Granado, PT  Physical Therapist  "

## 2018-08-30 ENCOUNTER — TREATMENT (OUTPATIENT)
Dept: PHYSICAL THERAPY | Facility: CLINIC | Age: 43
End: 2018-08-30

## 2018-08-30 DIAGNOSIS — Z98.890 S/P ARTHROSCOPIC PARTIAL LATERAL MENISCECTOMY: ICD-10-CM

## 2018-08-30 DIAGNOSIS — T84.89XD TEAR OF ANTERIOR CRUCIATE LIGAMENT GRAFT, SUBSEQUENT ENCOUNTER: ICD-10-CM

## 2018-08-30 DIAGNOSIS — Z98.890 S/P RECONSTRUCTION OF ANTERIOR CRUCIATE LIGAMENT: Primary | ICD-10-CM

## 2018-08-30 DIAGNOSIS — Z98.890 S/P ACL RECONSTRUCTION: ICD-10-CM

## 2018-08-30 PROCEDURE — 97112 NEUROMUSCULAR REEDUCATION: CPT | Performed by: PHYSICAL THERAPIST

## 2018-08-30 PROCEDURE — 97110 THERAPEUTIC EXERCISES: CPT | Performed by: PHYSICAL THERAPIST

## 2018-08-30 PROCEDURE — 97530 THERAPEUTIC ACTIVITIES: CPT | Performed by: PHYSICAL THERAPIST

## 2018-08-30 PROCEDURE — 97014 ELECTRIC STIMULATION THERAPY: CPT | Performed by: PHYSICAL THERAPIST

## 2018-08-30 NOTE — PROGRESS NOTES
Physical Therapy Daily Progress Note        Visit # : 26  Eleni Vaughan reports: The past 2 days have been the best days since surgery - less pain and overall ache     Subjective     Objective       Strength/Myotome Testing     Left Knee   Left knee flexion strength: 5-/5.  Extension: 5     See Exercise, Manual, and Modality Logs for complete treatment.       Assessment & Plan     Assessment  Assessment details: Patient presents with improved proprioception and strength.Visible fatigue persists with strength progressions. No reports of discomfort or instability with lateral jump attempts. Cont PT 2x per week for functional strength and stability- next visit attempt lunges if tolerated.         Progress strengthening /stabilization /functional activity           Manual Therapy:         mins  78931;  Therapeutic Exercise:    15     mins  12368;     Neuromuscular Shlomo:    10    mins  29509;    Therapeutic Activity:     10     mins  28925;     Gait Training:           mins  38014;     Ultrasound:          mins  04301;    Electrical Stimulation:    25     mins  32830 ( );  Dry Needling          mins self-pay    Timed Treatment:   35   mins   Total Treatment:     60   mins    Annetta Julian, PT  Physical Therapist  [place] License # [id number]

## 2018-09-05 ENCOUNTER — TREATMENT (OUTPATIENT)
Dept: PHYSICAL THERAPY | Facility: CLINIC | Age: 43
End: 2018-09-05

## 2018-09-05 DIAGNOSIS — Z98.890 S/P ACL RECONSTRUCTION: ICD-10-CM

## 2018-09-05 DIAGNOSIS — Z98.890 S/P ARTHROSCOPIC PARTIAL LATERAL MENISCECTOMY: ICD-10-CM

## 2018-09-05 DIAGNOSIS — Z98.890 S/P RECONSTRUCTION OF ANTERIOR CRUCIATE LIGAMENT: Primary | ICD-10-CM

## 2018-09-05 DIAGNOSIS — T84.89XD TEAR OF ANTERIOR CRUCIATE LIGAMENT GRAFT, SUBSEQUENT ENCOUNTER: ICD-10-CM

## 2018-09-05 PROCEDURE — 97110 THERAPEUTIC EXERCISES: CPT | Performed by: PHYSICAL THERAPIST

## 2018-09-05 PROCEDURE — 97530 THERAPEUTIC ACTIVITIES: CPT | Performed by: PHYSICAL THERAPIST

## 2018-09-05 PROCEDURE — 97116 GAIT TRAINING THERAPY: CPT | Performed by: PHYSICAL THERAPIST

## 2018-09-05 PROCEDURE — 97014 ELECTRIC STIMULATION THERAPY: CPT | Performed by: PHYSICAL THERAPIST

## 2018-09-05 PROCEDURE — 97112 NEUROMUSCULAR REEDUCATION: CPT | Performed by: PHYSICAL THERAPIST

## 2018-09-05 NOTE — PROGRESS NOTES
Physical Therapy Daily Progress Note        Visit # : 27  Eleni Vaughan reports: My knee felt really good over the weekend - still stiff in the morning - still have decreased endurance and anxious with stair descent     Subjective     Objective       Tenderness   Left Knee   No tenderness in the lateral joint line and medial joint line.     Patellar Mobility   Left Knee Patellar tendons within functional limits include the medial, lateral, superior and inferior.      See Exercise, Manual, and Modality Logs for complete treatment.       Assessment & Plan     Assessment  Assessment details: Patient presents with improved proprioception and strength.Visible fatigue persists with strength progressions. Verbal and tactile cues required for proper technique once fatigue begins. No reports of discomfort or instability with lunge attempts. Cont PT 2x per week for functional strength and stability- next visit attempt increased step height if tolerated.         Progress strengthening /stabilization /functional activity           Manual Therapy:         mins  68560;  Therapeutic Exercise:    25     mins  39278;     Neuromuscular Shlomo:    15    mins  53142;    Therapeutic Activity:     10     mins  03106;     Gait Training:      10     mins  27307;     Ultrasound:          mins  44421;    Electrical Stimulation:    25     mins  32635 ( );  Dry Needling          mins self-pay    Timed Treatment:   60   mins   Total Treatment:     85   mins    Annetta Julian, PT  Physical Therapist  [place] License # [id number]

## 2018-09-06 ENCOUNTER — TREATMENT (OUTPATIENT)
Dept: PHYSICAL THERAPY | Facility: CLINIC | Age: 43
End: 2018-09-06

## 2018-09-06 DIAGNOSIS — Z98.890 S/P RECONSTRUCTION OF ANTERIOR CRUCIATE LIGAMENT: Primary | ICD-10-CM

## 2018-09-06 PROCEDURE — 97014 ELECTRIC STIMULATION THERAPY: CPT | Performed by: PHYSICAL THERAPIST

## 2018-09-06 PROCEDURE — 97112 NEUROMUSCULAR REEDUCATION: CPT | Performed by: PHYSICAL THERAPIST

## 2018-09-06 PROCEDURE — 97530 THERAPEUTIC ACTIVITIES: CPT | Performed by: PHYSICAL THERAPIST

## 2018-09-06 PROCEDURE — 97110 THERAPEUTIC EXERCISES: CPT | Performed by: PHYSICAL THERAPIST

## 2018-09-06 NOTE — PROGRESS NOTES
Physical Therapy Daily Progress Note    Visit # : 28  Eleni Vaughan reports: her knee is feeling good, but it was a little sore and stiff this morning.      Subjective     Objective       Active Range of Motion     Additional Active Range of Motion Details  Left knee extenson during sitting LAQ 0 degrees, so discharged Neuro-muscular e-stim.        See Exercise, Manual, and Modality Logs for complete treatment.       Assessment & Plan     Assessment  Assessment details: Pt is doing well with all exercises and continues to show improvements in strength.  Pt tolerated all increases with strengthening and proprioception exercises.  Pt was able to jump through agility ladder double landing without valgus deformity on LLE and equal weight through B LE.  Will continue progressing pt's strength, endurance, and proprioception per protocol.          Progress per Plan of Care           Manual Therapy:         mins  36466;  Therapeutic Exercise:   45      mins  07048;     Neuromuscular Shlomo:  15      mins  96609;    Therapeutic Activity:   15       mins  34656;     Gait Training:           mins  20647;     Ultrasound:          mins  27883;    Electrical Stimulation:   15      mins  48935 ( );  Dry Needling          mins self-pay    Timed Treatment:  75    mins   Total Treatment:     102   mins    Cheri Granado, PT  Physical Therapist

## 2018-09-11 ENCOUNTER — TREATMENT (OUTPATIENT)
Dept: PHYSICAL THERAPY | Facility: CLINIC | Age: 43
End: 2018-09-11

## 2018-09-11 DIAGNOSIS — Z98.890 S/P RECONSTRUCTION OF ANTERIOR CRUCIATE LIGAMENT: Primary | ICD-10-CM

## 2018-09-11 PROCEDURE — 97110 THERAPEUTIC EXERCISES: CPT | Performed by: PHYSICAL THERAPIST

## 2018-09-11 PROCEDURE — 97014 ELECTRIC STIMULATION THERAPY: CPT | Performed by: PHYSICAL THERAPIST

## 2018-09-11 PROCEDURE — 97112 NEUROMUSCULAR REEDUCATION: CPT | Performed by: PHYSICAL THERAPIST

## 2018-09-11 PROCEDURE — 97530 THERAPEUTIC ACTIVITIES: CPT | Performed by: PHYSICAL THERAPIST

## 2018-09-11 NOTE — PROGRESS NOTES
Physical Therapy Daily Progress Note    Visit # : 29  Eleni Vaughan reports: she was sore through Saturday after last visit, but she is feeling good.  Pt states she can go up and down steps normally again without pain.      Subjective     Objective   See Exercise, Manual, and Modality Logs for complete treatment.       Assessment & Plan     Assessment  Assessment details: Pt is doing well with all exercises and continues to show improvements in strength.  Pt tolerated all increases with strengthening and proprioception exercises.  Added TB with squatting exercises secondary to having a valgus stress at knee with deeper knee bends.  Will continue progressing pt's strength, endurance, and proprioception per protocol.  Add lateral hops over box.          Progress per Plan of Care           Manual Therapy:         mins  87811;  Therapeutic Exercise:   45      mins  44429;     Neuromuscular Shlomo:  15      mins  82968;    Therapeutic Activity:   15       mins  61001;     Gait Training:           mins  07476;     Ultrasound:          mins  89621;    Electrical Stimulation:    15     mins  22830 ( );  Dry Needling          mins self-pay    Timed Treatment:  75    mins   Total Treatment:     90   mins    Cheri Granado, PT  Physical Therapist

## 2018-09-18 ENCOUNTER — TREATMENT (OUTPATIENT)
Dept: PHYSICAL THERAPY | Facility: CLINIC | Age: 43
End: 2018-09-18

## 2018-09-18 DIAGNOSIS — Z98.890 S/P RECONSTRUCTION OF ANTERIOR CRUCIATE LIGAMENT: Primary | ICD-10-CM

## 2018-09-18 PROCEDURE — 97110 THERAPEUTIC EXERCISES: CPT | Performed by: PHYSICAL THERAPIST

## 2018-09-18 PROCEDURE — 97530 THERAPEUTIC ACTIVITIES: CPT | Performed by: PHYSICAL THERAPIST

## 2018-09-18 PROCEDURE — 97112 NEUROMUSCULAR REEDUCATION: CPT | Performed by: PHYSICAL THERAPIST

## 2018-09-18 NOTE — PROGRESS NOTES
Physical Therapy Daily Progress Note    Visit # : 30  Eelni Vaughan reports: her knee is doing good and denies having any increased pain or soreness from last visit.  Pt states her knee just feels stiff today.      Subjective     Objective   See Exercise, Manual, and Modality Logs for complete treatment.       Assessment & Plan     Assessment  Assessment details: Pt is doing well with all exercises and continues to show improvements in strength.  Pt tolerated all increases with strengthening, proprioception, and plyometrics.  Pt fatigues easily during the plyometrics and agility ladder exercises, but has good B LE landings.  Will continue progressing pt's strength, endurance, and proprioception per protocol.  Pt will need a MD note/reassessment next visit.          Progress per Plan of Care           Manual Therapy:         mins  65403;  Therapeutic Exercise:    50     mins  54728;     Neuromuscular Shlomo:  15      mins  87666;    Therapeutic Activity:   15       mins  11307;     Gait Training:           mins  06054;     Ultrasound:          mins  63716;    Electrical Stimulation:         mins  64469 ( );  Dry Needling          mins self-pay    Timed Treatment:  80    mins   Total Treatment:     90   mins    Cheri Granaod, PT  Physical Therapist

## 2018-09-25 ENCOUNTER — TREATMENT (OUTPATIENT)
Dept: PHYSICAL THERAPY | Facility: CLINIC | Age: 43
End: 2018-09-25

## 2018-09-25 DIAGNOSIS — Z98.890 S/P RECONSTRUCTION OF ANTERIOR CRUCIATE LIGAMENT: Primary | ICD-10-CM

## 2018-09-25 PROCEDURE — 97530 THERAPEUTIC ACTIVITIES: CPT | Performed by: PHYSICAL THERAPIST

## 2018-09-25 PROCEDURE — 97110 THERAPEUTIC EXERCISES: CPT | Performed by: PHYSICAL THERAPIST

## 2018-09-25 PROCEDURE — 97112 NEUROMUSCULAR REEDUCATION: CPT | Performed by: PHYSICAL THERAPIST

## 2018-09-25 NOTE — PROGRESS NOTES
Physical Therapy Progress Note  9/25/2018  Matthew Cooper MD    Re: Eleni Vaughan  ________________________________________________________________    Mrs. Eleni Vaughan, Patient seen for 31 sessions.  Treatment has consisted of: AAROM, AROM, RROM, proprioception, gait training, Nustep, fast walking on the treadmill, low intensity vertical plyometrics, pt performing a HEP, and pt has been ending treatment with IFC and cold packs.       S: Mrs. Eleni Vaughan states: her left knee pain is a 0/10 on a daily bases.  Pt does get some muscle soreness and aching pain rating it a 0-2/10 after work-outs or PT.      Subjective     Objective       Active Range of Motion   Left Knee   Flexion: 150 degrees   Extension: 0 degrees     Strength/Myotome Testing     Left Hip   Planes of Motion   Flexion: 5  Extension: 4+  Abduction: 5  Adduction: 5    Left Knee   Flexion: 4+  Extension: 5     See Exercise, Manual, and Modality Logs for complete treatment.       Assessment & Plan     Assessment  Assessment details: Pt is doing well with all exercises and continues to show improvements in strength, ROM, proprioception, and decreased pain.  Pt tolerated all increases with strengthening exercises and plyometrics.  Pt fatigues easily during the plyometrics and agility ladder exercises, but has good B LE landings.  Pt has accomplished all STGs and 4/5 LTGs.  Pt still has some hamstring weakness, but continues to progress strengthening exercises without pain.  Feel pt could continue progressing her strength as a HEP, but please advise.          Awaiting MD orders           Manual Therapy:         mins  19585;  Therapeutic Exercise:   50      mins  69767;     Neuromuscular Shlomo:  15      mins  41374;    Therapeutic Activity:   15       mins  42918;     Gait Training:           mins  44601;     Ultrasound:          mins  02205;    Electrical Stimulation:         mins  25684 ( );  Dry Needling          mins  self-pay    Timed Treatment:  80    mins   Total Treatment:     90   mins    Cheri Granado, PT  Physical Therapist

## 2018-10-02 ENCOUNTER — TREATMENT (OUTPATIENT)
Dept: PHYSICAL THERAPY | Facility: CLINIC | Age: 43
End: 2018-10-02

## 2018-10-02 DIAGNOSIS — Z98.890 S/P ARTHROSCOPIC PARTIAL LATERAL MENISCECTOMY: ICD-10-CM

## 2018-10-02 DIAGNOSIS — Z98.890 S/P ACL RECONSTRUCTION: ICD-10-CM

## 2018-10-02 DIAGNOSIS — Z98.890 S/P RECONSTRUCTION OF ANTERIOR CRUCIATE LIGAMENT: Primary | ICD-10-CM

## 2018-10-02 DIAGNOSIS — T84.89XD TEAR OF ANTERIOR CRUCIATE LIGAMENT GRAFT, SUBSEQUENT ENCOUNTER: ICD-10-CM

## 2018-10-02 PROCEDURE — 97112 NEUROMUSCULAR REEDUCATION: CPT | Performed by: PHYSICAL THERAPIST

## 2018-10-02 PROCEDURE — 97110 THERAPEUTIC EXERCISES: CPT | Performed by: PHYSICAL THERAPIST

## 2018-10-02 PROCEDURE — 97530 THERAPEUTIC ACTIVITIES: CPT | Performed by: PHYSICAL THERAPIST

## 2018-10-02 NOTE — PROGRESS NOTES
Physical Therapy Daily Progress Note    Visit # : 32  Eleni Vaughan reports: her knee is a little more achy today.  Pt goes to MD next week.      Subjective     Objective   See Exercise, Manual, and Modality Logs for complete treatment.       Assessment & Plan     Assessment  Assessment details: Pt is doing well with all exercises and continues to show improvements in strength, ROM, proprioception, and decreased pain.  Pt tolerated all increases with strengthening exercises and plyometrics.  Pt fatigues easily during the plyometrics and agility ladder exercises, but has good B LE landings.  Feel pt could continue progressing her strength as a HEP.  Pt sees MD next week and will continue as MD advises.           Progress per Plan of Care           Manual Therapy:        mins  52768;  Therapeutic Exercise:  35       mins  73019;     Neuromuscular Shlomo:  15      mins  68847;    Therapeutic Activity:    15      mins  90046;     Gait Training:           mins  44884;     Ultrasound:          mins  06966;    Electrical Stimulation:         mins  75927 ( );  Dry Needling          mins self-pay    Timed Treatment:  65    mins   Total Treatment:     82   mins    Cheri Granado, PT  Physical Therapist

## 2018-10-09 ENCOUNTER — OFFICE VISIT (OUTPATIENT)
Dept: ORTHOPEDIC SURGERY | Facility: CLINIC | Age: 43
End: 2018-10-09

## 2018-10-09 VITALS — WEIGHT: 190 LBS | BODY MASS INDEX: 25.73 KG/M2 | HEIGHT: 72 IN

## 2018-10-09 DIAGNOSIS — Z98.890 S/P ARTHROSCOPIC PARTIAL LATERAL MENISCECTOMY: ICD-10-CM

## 2018-10-09 DIAGNOSIS — Z98.890 S/P ACL RECONSTRUCTION: Primary | ICD-10-CM

## 2018-10-09 PROCEDURE — 99212 OFFICE O/P EST SF 10 MIN: CPT | Performed by: ORTHOPAEDIC SURGERY

## 2018-10-09 NOTE — PROGRESS NOTES
Subjective:     Patient ID: Eleni Vaughan is a 42 y.o. female.    Chief Complaint:  Status post left knee ACL reconstruction with hamstring autograft and allograft supplement, partial lateral meniscectomy, DOS 6/18/2018  History of Present Illness  Eleni Vaughan returns to clinic today for evaluation of left knee, doing very well this point time, notes continued improvement in regards to strength and activities.  Minimal intermittent swelling, denies any fevers chills or sweats, denies any buckling or giving way of her knee.  Denies any pain, no issues with her incisions.  Continue to progress with physical therapy in her home exercise program.     Social History     Occupational History   • Not on file.     Social History Main Topics   • Smoking status: Former Smoker     Years: 5.00     Types: Cigarettes     Quit date: 2008   • Smokeless tobacco: Never Used      Comment: OCCASIONAL   • Alcohol use 3.0 oz/week     5 Glasses of wine per week      Comment: daily   • Drug use: Yes     Types: Marijuana, Cocaine, LSD      Comment: ACID, H/O OCCASIONAL USE  10 YRS AGO   • Sexual activity: Defer      Past Medical History:   Diagnosis Date   • Allergic    • Breast pain in female    • History of chicken pox    • History of heart murmur in childhood    • History of uterine fibroid    • Left knee pain     SCHED SURGERY   • Threatened miscarriage    • Vaginal delivery      Past Surgical History:   Procedure Laterality Date   • DILATATION AND CURETTAGE     • HERNIA REPAIR Right 1989    INGUINAL   • KNEE ACL RECONSTRUCTION Left 6/18/2018    Procedure: REVISION KNEE ANTERIOR CRUCIATE LIGAMENT RECONSTRUCTION WITH HAMSTRING AUTOGRAFT, possible bone patella tendon bone allograft, hardware removal. meniscal and cartilage surgery as indicated;  Surgeon: Matthew Cooper MD;  Location: [facility];  Service: Orthopedics   • KNEE SURGERY Left 1992    ACL recon BTB       Family History   Problem Relation Age of Onset   • No Known  "Problems Mother    • No Known Problems Father    • No Known Problems Sister    • No Known Problems Brother    • No Known Problems Daughter    • No Known Problems Son    • Cancer Maternal Grandmother    • No Known Problems Paternal Grandmother    • No Known Problems Maternal Aunt    • No Known Problems Paternal Aunt    • BRCA 1/2 Neg Hx    • Breast cancer Neg Hx    • Colon cancer Neg Hx    • Endometrial cancer Neg Hx    • Ovarian cancer Neg Hx    • Malig Hyperthermia Neg Hx          Review of Systems   Constitutional: Negative for chills, diaphoresis, fever and unexpected weight change.   HENT: Negative for hearing loss, nosebleeds, sore throat and tinnitus.    Eyes: Negative for pain and visual disturbance.   Respiratory: Negative for cough, shortness of breath and wheezing.    Cardiovascular: Negative for chest pain and palpitations.   Gastrointestinal: Negative for abdominal pain, diarrhea, nausea and vomiting.   Endocrine: Negative for cold intolerance, heat intolerance and polydipsia.   Genitourinary: Negative for difficulty urinating, dysuria and hematuria.   Musculoskeletal: Positive for myalgias. Negative for arthralgias and joint swelling.   Skin: Negative for rash and wound.   Allergic/Immunologic: Negative for environmental allergies.   Neurological: Negative for dizziness, syncope and numbness.   Hematological: Does not bruise/bleed easily.   Psychiatric/Behavioral: Negative for dysphoric mood and sleep disturbance. The patient is not nervous/anxious.            Objective:  Vitals:    10/09/18 1046   Weight: 86.2 kg (190 lb)   Height: 182.9 cm (72\")     1    10/09/18  1046   Weight: 86.2 kg (190 lb)     Body mass index is 25.77 kg/m².  General: No acute distress.  Resp: normal respiratory effort  Skin: no rashes or wounds; normal turgor  Psych: mood and affect appropriate; recent and remote memory intact          Ortho Exam     Left knee-active range of motion 0-145°, 5 out of 5 strength on flexion and " extension, no extensor lag on straight leg raise.  Grade 1A Lachman, negative anterior posterior drawer, no joint line pain, incisions well-healed.  Negative Paco exam.  Positive sensation light touch all distibution's left foot symmetric to the right.       Imaging:    Assessment:        1. S/P ACL reconstruction    2. S/P arthroscopic partial lateral meniscectomy           Plan:          Discussed treatment options at length with patient at today's visit.  Patient doing very well this point time, continue with home exercise program and strengthening, weaning out of physical therapy, recommended use of knee sleeve as tolerated.  Follow-up in 3 months with repeat x-rays left knee.    Eleni Vaughan was in agreement with plan and had all questions answered.     Orders:  No orders of the defined types were placed in this encounter.      Medications:  No orders of the defined types were placed in this encounter.      Followup:  Return in about 3 months (around 1/9/2019) for xrays needed at follow up.    Eleni was seen today for follow-up and pain.    Diagnoses and all orders for this visit:    S/P ACL reconstruction    S/P arthroscopic partial lateral meniscectomy          Dictated utilizing Dragon dictation

## 2018-10-18 ENCOUNTER — TREATMENT (OUTPATIENT)
Dept: PHYSICAL THERAPY | Facility: CLINIC | Age: 43
End: 2018-10-18

## 2018-10-18 DIAGNOSIS — Z98.890 S/P ARTHROSCOPIC PARTIAL LATERAL MENISCECTOMY: ICD-10-CM

## 2018-10-18 DIAGNOSIS — Z98.890 S/P RECONSTRUCTION OF ANTERIOR CRUCIATE LIGAMENT: Primary | ICD-10-CM

## 2018-10-18 DIAGNOSIS — Z98.890 S/P ACL RECONSTRUCTION: ICD-10-CM

## 2018-10-18 DIAGNOSIS — T84.89XD TEAR OF ANTERIOR CRUCIATE LIGAMENT GRAFT, SUBSEQUENT ENCOUNTER: ICD-10-CM

## 2018-10-18 PROCEDURE — 97530 THERAPEUTIC ACTIVITIES: CPT | Performed by: PHYSICAL THERAPIST

## 2018-10-18 PROCEDURE — 97110 THERAPEUTIC EXERCISES: CPT | Performed by: PHYSICAL THERAPIST

## 2018-10-18 PROCEDURE — 97112 NEUROMUSCULAR REEDUCATION: CPT | Performed by: PHYSICAL THERAPIST

## 2018-10-18 NOTE — PROGRESS NOTES
Physical Therapy Daily Progress Note        Visit # : 33  Eleni Vaughan reports: Knee still aches a little bit - Doctor wants me to continue PT for a couple weeks for strength and stability     Subjective     Objective       Active Range of Motion   Left Knee   Flexion: 145 degrees   Extension: 0 degrees     Strength/Myotome Testing     Left Knee   Flexion: 5  Extension: 5     See Exercise, Manual, and Modality Logs for complete treatment.       Assessment & Plan     Assessment  Assessment details: Patient presents with full ROM and strength. Deficits persist in decreased stability and tolerance to pivoting and twisting. Tolerated all strength and stabilization progressions well today - no increase in s/s after treatment. Cont PT 1x per week for strength and stability - next visit attempt single leg hopping.         Progress strengthening /stabilization /functional activity           Manual Therapy:         mins  40834;  Therapeutic Exercise:    25     mins  91769;     Neuromuscular Shlomo:    10    mins  59290;    Therapeutic Activity:     15     mins  26582;     Gait Training:           mins  81928;     Ultrasound:          mins  57542;    Electrical Stimulation:         mins  54078 ( );  Dry Needling          mins self-pay    Timed Treatment:   50   mins   Total Treatment:     60   mins    Annetta Julian, PT  Physical Therapist  [place] License # [id number]

## 2018-10-29 ENCOUNTER — TREATMENT (OUTPATIENT)
Dept: PHYSICAL THERAPY | Facility: CLINIC | Age: 43
End: 2018-10-29

## 2018-10-29 DIAGNOSIS — Z98.890 S/P RECONSTRUCTION OF ANTERIOR CRUCIATE LIGAMENT: Primary | ICD-10-CM

## 2018-10-29 DIAGNOSIS — Z98.890 S/P ARTHROSCOPIC PARTIAL LATERAL MENISCECTOMY: ICD-10-CM

## 2018-10-29 DIAGNOSIS — Z98.890 S/P ACL RECONSTRUCTION: ICD-10-CM

## 2018-10-29 DIAGNOSIS — T84.89XD TEAR OF ANTERIOR CRUCIATE LIGAMENT GRAFT, SUBSEQUENT ENCOUNTER: ICD-10-CM

## 2018-10-29 PROCEDURE — 97110 THERAPEUTIC EXERCISES: CPT | Performed by: PHYSICAL THERAPIST

## 2018-10-29 PROCEDURE — 97112 NEUROMUSCULAR REEDUCATION: CPT | Performed by: PHYSICAL THERAPIST

## 2018-10-29 PROCEDURE — 97530 THERAPEUTIC ACTIVITIES: CPT | Performed by: PHYSICAL THERAPIST

## 2018-10-29 NOTE — PROGRESS NOTES
Physical Therapy Daily Progress Note        Visit # : 34  Eleni Vaughan reports: I can still feel soreness in my left knee with increased activity     Subjective     Objective       Strength/Myotome Testing     Left Knee   Flexion: 5  Extension: 5     See Exercise, Manual, and Modality Logs for complete treatment.       Assessment & Plan     Assessment  Assessment details: Patient presents with full ROM and strength. Deficits persist in decreased stability and tolerance to pivoting and twisting. Tolerated all strength and stabilization progressions well today - no increase in s/s after treatment. Cont PT 1x per week for strength and stability - anticipate DC next visit.         Progress strengthening /stabilization /functional activity           Manual Therapy:         mins  30266;  Therapeutic Exercise:    25     mins  48713;     Neuromuscular Shlomo:    10    mins  24816;    Therapeutic Activity:     10     mins  12575;     Gait Training:           mins  21517;     Ultrasound:          mins  04739;    Electrical Stimulation:         mins  18699 ( );  Dry Needling          mins self-pay    Timed Treatment:   45   mins   Total Treatment:     55  mins    Annetta Julian, PT  Physical Therapist  [place] License # [id number]

## 2018-11-07 ENCOUNTER — TELEPHONE (OUTPATIENT)
Dept: ORTHOPEDIC SURGERY | Facility: CLINIC | Age: 43
End: 2018-11-07

## 2018-11-07 DIAGNOSIS — Z98.890 S/P ACL RECONSTRUCTION: Primary | ICD-10-CM

## 2018-11-08 NOTE — TELEPHONE ENCOUNTER
Left message for patient informing her that I have faxed order and demographics to  [facility] per her request.

## 2018-11-13 ENCOUNTER — TREATMENT (OUTPATIENT)
Dept: PHYSICAL THERAPY | Facility: CLINIC | Age: 43
End: 2018-11-13

## 2018-11-13 DIAGNOSIS — Z98.890 S/P RECONSTRUCTION OF ANTERIOR CRUCIATE LIGAMENT: Primary | ICD-10-CM

## 2018-11-13 DIAGNOSIS — Z98.890 S/P ACL RECONSTRUCTION: ICD-10-CM

## 2018-11-13 DIAGNOSIS — T84.89XD TEAR OF ANTERIOR CRUCIATE LIGAMENT GRAFT, SUBSEQUENT ENCOUNTER: ICD-10-CM

## 2018-11-13 DIAGNOSIS — Z98.890 S/P ARTHROSCOPIC PARTIAL LATERAL MENISCECTOMY: ICD-10-CM

## 2018-11-13 PROCEDURE — 97110 THERAPEUTIC EXERCISES: CPT | Performed by: PHYSICAL THERAPIST

## 2018-11-13 PROCEDURE — 97530 THERAPEUTIC ACTIVITIES: CPT | Performed by: PHYSICAL THERAPIST

## 2018-11-13 PROCEDURE — 97112 NEUROMUSCULAR REEDUCATION: CPT | Performed by: PHYSICAL THERAPIST

## 2018-11-13 NOTE — PROGRESS NOTES
Re-Assessment / Re-Certification/ Discharge        Patient: Eleni Vaughan   : 1975  Diagnosis/ICD-10 Code:  S/P reconstruction of anterior cruciate ligament [Z98.890]  Referring practitioner: Matthew Cooper MD  Date of Initial Visit: 2018  Today's Date: 2018  Patient seen for 35 sessions      Subjective:   Elnei Vaughan reports: No concern, have been doing normal activity without any increase in s/s, a little ache with change in weather - 0/10 current pain   Subjective Questionnaire: LEFS: 73  Clinical Progress: improved  Home Program Compliance: Yes  Treatment has included: therapeutic exercise, neuromuscular re-education, manual therapy, therapeutic activity, gait training, electrical stimulation and cryotherapy    Subjective   Objective       Tenderness     Right Knee   Tenderness in the medial joint line.     Active Range of Motion   Left Knee   Flexion: 145 degrees   Extension: 0 degrees     Patellar Mobility   Left Knee Patellar tendons within functional limits include the medial, lateral, superior and inferior.     Strength/Myotome Testing     Left Knee   Flexion: 5  Extension: 5  Quadriceps contraction: good     Assessment & Plan     Assessment  Assessment details: Patient presents with full ROM and strength. Deficits persist in decreased stability and tolerance to pivoting and twisting. Tolerated all strength and stabilization progressions well today - no increase in s/s after treatment. Reviewed in depth HEP progressions and compliance. Patient to follow-up with MD 1/15/18.       Progress toward previous goals: All Met    New Goals  Short-term goals (STG): MET   Long-term goals (LTG): MET      Recommendations: Discharge    Prognosis to achieve goals: good    PT Signature: Annetta Julian, PT      Based upon review of the patient's progress and continued therapy plan, it is my medical opinion that Eleni Vaughan should continue physical therapy treatment at [facility]  [facility]  [address] [address]  [phone].    Signature: __________________________________  Matthew Cooper MD    Manual Therapy:         mins  95866;  Therapeutic Exercise:    20     mins  75076;     Neuromuscular Shlomo:    10    mins  16470;    Therapeutic Activity:     10     mins  52237;     Gait Training:           mins  57935;     Ultrasound:          mins  43665;    Electrical Stimulation:         mins  94446 ( );  Dry Needling          mins self-pay    Timed Treatment:   40   mins   Total Treatment:     50   mins

## 2019-01-02 DIAGNOSIS — R92.8 ABNORMAL MAMMOGRAM: Primary | ICD-10-CM

## 2019-02-06 ENCOUNTER — OFFICE VISIT (OUTPATIENT)
Dept: OBSTETRICS AND GYNECOLOGY | Age: 44
End: 2019-02-06

## 2019-02-06 VITALS
DIASTOLIC BLOOD PRESSURE: 78 MMHG | WEIGHT: 201 LBS | HEIGHT: 67 IN | SYSTOLIC BLOOD PRESSURE: 120 MMHG | BODY MASS INDEX: 31.55 KG/M2

## 2019-02-06 DIAGNOSIS — N94.3 PMS (PREMENSTRUAL SYNDROME): ICD-10-CM

## 2019-02-06 DIAGNOSIS — R92.8 ABNORMAL MAMMOGRAM: Primary | ICD-10-CM

## 2019-02-06 DIAGNOSIS — D24.2 BREAST FIBROADENOMA, LEFT: ICD-10-CM

## 2019-02-06 PROCEDURE — 99213 OFFICE O/P EST LOW 20 MIN: CPT | Performed by: OBSTETRICS & GYNECOLOGY

## 2019-02-06 NOTE — PROGRESS NOTES
Routine Annual Visit    2019    Patient: Eleni Vaughan          MR#:[id number]      Chief Complaint   Patient presents with   • Breast Problem     PT HERE FOR ROUTINE 6 MO BREAST CHECK DUE TO BX SHE HAD 6 MO'S AGO. SHE IS WELL.       History of Present Illness    43 y.o. female  who presents for annual exam.           Patient's last menstrual period was 2019.  Obstetric History:  OB History      Para Term  AB Living    4 2 2   2 2    SAB TAB Ectopic Molar Multiple Live Births    2         2         Menstrual History:     Patient's last menstrual period was 2019.       Sexual History:       ________________________________________  Patient Active Problem List   Diagnosis   • S/P arthroscopic partial lateral meniscectomy   • S/P ACL reconstruction       Past Medical History:   Diagnosis Date   • Allergic    • Breast pain in female    • History of chicken pox    • History of heart murmur in childhood    • History of uterine fibroid    • Left knee pain     SCHED SURGERY   • Threatened miscarriage    • Vaginal delivery        Past Surgical History:   Procedure Laterality Date   • DILATATION AND CURETTAGE     • HERNIA REPAIR Right     INGUINAL   • KNEE ACL RECONSTRUCTION Left 2018    Procedure: REVISION KNEE ANTERIOR CRUCIATE LIGAMENT RECONSTRUCTION WITH HAMSTRING AUTOGRAFT, possible bone patella tendon bone allograft, hardware removal. meniscal and cartilage surgery as indicated;  Surgeon: Matthew Cooper MD;  Location: [facility];  Service: Orthopedics   • KNEE SURGERY Left     ACL recon BTB       Social History     Tobacco Use   Smoking Status Former Smoker   • Years: 5.00   • Types: Cigarettes   • Last attempt to quit:    • Years since quittin.1   Smokeless Tobacco Never Used   Tobacco Comment    OCCASIONAL       has a current medication list which includes the following prescription(s): ibuprofen.  ________________________________________    Current  "contraception: {contraceptive method:5051}  History of abnormal Pap smear: {yes***/no:18280}  Family history of Breast cancer:   Family history of uterine or ovarian cancer: {yes***/no:86096}  Family History of colon cancer/colon polyps: {yes**/no:98586}  History of abnormal mammogram: {yes***/no:52116}      {Common ambulatory SmartLinks:66593}    Review of Systems    {ROS - COMPLETE + GYN:1007520339}     Objective   Physical Exam    /78   Ht 170.2 cm (67\")   Wt 91.2 kg (201 lb)   LMP 01/30/2019   BMI 31.48 kg/m²    BP Readings from Last 3 Encounters:   02/06/19 120/78   09/19/18 113/70   07/30/18 118/80      Wt Readings from Last 3 Encounters:   02/06/19 91.2 kg (201 lb)   10/09/18 86.2 kg (190 lb)   09/19/18 88.5 kg (195 lb)      BMI: Estimated body mass index is 31.48 kg/m² as calculated from the following:    Height as of this encounter: 170.2 cm (67\").    Weight as of this encounter: 91.2 kg (201 lb).      General:   {gen appearance:87728::\"alert\",\"appears stated age\",\"cooperative\"}   Abdomen: {abd exam:18771::\"soft, non-tender, without masses or organomegaly\"}   Breast: {exam; breast female:1202::\"inspection negative, no nipple discharge or bleeding, no masses or nodularity palpable\"}   Vulva: {vulva exam:55525}   Vagina: {vaginal exam:53380}   Cervix: {cervix exam:70230}   Uterus: {exam; uterus:61200}   Adnexa: {exam; adnexa:51270}     Assessment:    1. Normal annual exam   Assessment     ICD-10-CM ICD-9-CM   1. Abnormal mammogram R92.8 793.80   2. Breast fibroadenoma, left D24.2 217   3. PMS (premenstrual syndrome) N94.3 625.4     Plan:    Plan     []  Mammogram request made  []  PAP done  []  Labs:   []  GC/Chl/TV  []  DEXA scan   []  Referral for colonoscopy:       Eleni was seen today for breast problem.    Diagnoses and all orders for this visit:    Abnormal mammogram    Breast fibroadenoma, left    PMS (premenstrual syndrome)            Counseling:  --Nutrition: Stressed importance of " moderation and caloric balance, stressed fresh fruit and vegetables  --Exercise: Stressed the importance of regular exercise. 3-5 times weekly   - Discussed screening mammogram recommendations.   --Discussed benefits of screening colonoscopy- age 45 unless FH  --Discussed pap smear screening recommendations

## 2019-02-06 NOTE — PROGRESS NOTES
GYN Visit    2019    Patient: Eleni Vaughan          MR#:[id number]      Chief Complaint   Patient presents with   • Breast Problem     PT HERE FOR ROUTINE 6 MO BREAST CHECK DUE TO BX SHE HAD 6 MO'S AGO. SHE IS WELL.       History of Present Illness    43 y.o. female  who presents for  Breast check  Also a new problem of PMS  Was prescribed prozac by primary care and wanted to discuss with me as well  Anxiety and irritable and depressed right before menses (regular and no issues, VAS)  I rec give it a try and discussed taking it 2 weeks prior to menses - she has 10 mg dose    Also here for follow up of fibroadenoma of left breast at 8 Oclock  Pt cant feel it, biopsy proven , has fu US scheduled soon          Patient's last menstrual period was 2019.    ________________________________________  Patient Active Problem List   Diagnosis   • S/P arthroscopic partial lateral meniscectomy   • S/P ACL reconstruction       Past Medical History:   Diagnosis Date   • Allergic    • Breast pain in female    • History of chicken pox    • History of heart murmur in childhood    • History of uterine fibroid    • Left knee pain     SCHED SURGERY   • Threatened miscarriage    • Vaginal delivery        Past Surgical History:   Procedure Laterality Date   • DILATATION AND CURETTAGE     • HERNIA REPAIR Right     INGUINAL   • KNEE ACL RECONSTRUCTION Left 2018    Procedure: REVISION KNEE ANTERIOR CRUCIATE LIGAMENT RECONSTRUCTION WITH HAMSTRING AUTOGRAFT, possible bone patella tendon bone allograft, hardware removal. meniscal and cartilage surgery as indicated;  Surgeon: Matthew Cooper MD;  Location: [facility];  Service: Orthopedics   • KNEE SURGERY Left     ACL recon BTB       Social History     Tobacco Use   Smoking Status Former Smoker   • Years: 5.00   • Types: Cigarettes   • Last attempt to quit:    • Years since quittin.1   Smokeless Tobacco Never Used   Tobacco Comment    OCCASIONAL  "      has a current medication list which includes the following prescription(s): ibuprofen.  ________________________________________    Current contraception: vasectomy      The following portions of the patient's history were reviewed and updated as appropriate: allergies, current medications, past family history, past medical history, past social history, past surgical history and problem list.    Review of Systems   Genitourinary: Negative for menstrual problem and pelvic pain.   Psychiatric/Behavioral: Positive for dysphoric mood. Negative for suicidal ideas. The patient is nervous/anxious.    All other systems reviewed and are negative.      Pertinent items are noted in HPI.     Objective   Physical Exam    /78   Ht 170.2 cm (67\")   Wt 91.2 kg (201 lb)   LMP 01/30/2019   BMI 31.48 kg/m²    BP Readings from Last 3 Encounters:   02/06/19 120/78   09/19/18 113/70   07/30/18 118/80      Wt Readings from Last 3 Encounters:   02/06/19 91.2 kg (201 lb)   10/09/18 86.2 kg (190 lb)   09/19/18 88.5 kg (195 lb)      BMI: Estimated body mass index is 31.48 kg/m² as calculated from the following:    Height as of this encounter: 170.2 cm (67\").    Weight as of this encounter: 91.2 kg (201 lb).    Neck: no adenopathy, supple, symmetrical, trachea midline and thyroid not enlarged, symmetric, no tenderness/mass/nodules  Lungs: clear to auscultation bilaterally  Extremities: extremities normal, atraumatic, no cyanosis or edema  Skin: Skin color, texture, turgor normal. No rashes or lesions  Neurologic: Grossly normal  General:   alert, appears stated age and cooperative   Abdomen: soft, non-tender, without masses or organomegaly   Breast: inspection negative, no nipple discharge or bleeding, no masses or nodularity palpable and no mass felt in area of concern   Assessment:      Eleni was seen today for breast problem.    Diagnoses and all orders for this visit:    Abnormal mammogram    Breast fibroadenoma, " left    PMS (premenstrual syndrome)        US scheduled, clinical exam is negative  Encouraged to try the prozac   Follow up AE and mammo in September this year

## 2019-02-15 ENCOUNTER — OFFICE VISIT (OUTPATIENT)
Dept: ORTHOPEDIC SURGERY | Facility: CLINIC | Age: 44
End: 2019-02-15

## 2019-02-15 VITALS — WEIGHT: 197 LBS | BODY MASS INDEX: 26.68 KG/M2 | HEIGHT: 72 IN

## 2019-02-15 DIAGNOSIS — R52 PAIN: Primary | ICD-10-CM

## 2019-02-15 DIAGNOSIS — Z98.890 S/P ACL RECONSTRUCTION: ICD-10-CM

## 2019-02-15 PROCEDURE — 73562 X-RAY EXAM OF KNEE 3: CPT | Performed by: ORTHOPAEDIC SURGERY

## 2019-02-15 PROCEDURE — 99212 OFFICE O/P EST SF 10 MIN: CPT | Performed by: ORTHOPAEDIC SURGERY

## 2019-02-15 RX ORDER — FLUOXETINE 10 MG/1
10 CAPSULE ORAL DAILY
COMMUNITY
End: 2020-04-03

## 2019-02-19 ENCOUNTER — APPOINTMENT (OUTPATIENT)
Dept: WOMENS IMAGING | Facility: HOSPITAL | Age: 44
End: 2019-02-19

## 2019-02-19 PROCEDURE — 76641 ULTRASOUND BREAST COMPLETE: CPT | Performed by: RADIOLOGY

## 2019-02-26 DIAGNOSIS — R92.8 ABNORMAL MAMMOGRAM: ICD-10-CM

## 2019-08-20 ENCOUNTER — OFFICE VISIT (OUTPATIENT)
Dept: ORTHOPEDIC SURGERY | Facility: CLINIC | Age: 44
End: 2019-08-20

## 2019-08-20 DIAGNOSIS — Z98.890 S/P ACL RECONSTRUCTION: Primary | ICD-10-CM

## 2019-08-20 PROCEDURE — 73562 X-RAY EXAM OF KNEE 3: CPT | Performed by: ORTHOPAEDIC SURGERY

## 2019-08-20 PROCEDURE — 99212 OFFICE O/P EST SF 10 MIN: CPT | Performed by: ORTHOPAEDIC SURGERY

## 2019-08-20 NOTE — PROGRESS NOTES
Subjective:     Patient ID: Eleni Vaughan is a 43 y.o. female.    Chief Complaint: Status post left knee ACL reconstruction with hamstring autograft and allograft supplement, partial lateral meniscectomy, DOS 2018    History of Present Illness  Eleni Vaughan returns to clinic today for evaluation of left knee. She states she has not needed to wear her brace and has returned to all of her activities. She denies any significant residual pain. She denies any numbness or tingling.    Social History     Occupational History   • Not on file   Tobacco Use   • Smoking status: Former Smoker     Years: 5.00     Types: Cigarettes     Last attempt to quit:      Years since quittin.6   • Smokeless tobacco: Never Used   • Tobacco comment: OCCASIONAL   Substance and Sexual Activity   • Alcohol use: Yes     Alcohol/week: 3.0 oz     Types: 5 Glasses of wine per week     Comment: daily   • Drug use: Yes     Types: Marijuana, Cocaine, LSD     Comment: ACID, H/O OCCASIONAL USE  10 YRS AGO   • Sexual activity: Defer      Past Medical History:   Diagnosis Date   • Allergic    • Breast pain in female    • History of chicken pox    • History of heart murmur in childhood    • History of uterine fibroid    • Left knee pain     SCHED SURGERY   • Threatened miscarriage    • Vaginal delivery      Past Surgical History:   Procedure Laterality Date   • DILATATION AND CURETTAGE     • HERNIA REPAIR Right     INGUINAL   • KNEE ACL RECONSTRUCTION Left 2018    Procedure: REVISION KNEE ANTERIOR CRUCIATE LIGAMENT RECONSTRUCTION WITH HAMSTRING AUTOGRAFT, possible bone patella tendon bone allograft, hardware removal. meniscal and cartilage surgery as indicated;  Surgeon: Matthew Cooper MD;  Location: [facility];  Service: Orthopedics   • KNEE SURGERY Left     ACL recon BTB       Family History   Problem Relation Age of Onset   • No Known Problems Mother    • No Known Problems Father    • No Known Problems Sister    • No  Known Problems Brother    • No Known Problems Daughter    • No Known Problems Son    • Cancer Maternal Grandmother    • No Known Problems Paternal Grandmother    • No Known Problems Maternal Aunt    • No Known Problems Paternal Aunt    • BRCA 1/2 Neg Hx    • Breast cancer Neg Hx    • Colon cancer Neg Hx    • Endometrial cancer Neg Hx    • Ovarian cancer Neg Hx    • Malig Hyperthermia Neg Hx          Review of Systems   Constitutional: Negative for chills, diaphoresis, fever and unexpected weight change.   HENT: Negative for hearing loss, nosebleeds, sore throat and tinnitus.    Eyes: Negative for pain and visual disturbance.   Respiratory: Negative for cough, shortness of breath and wheezing.    Cardiovascular: Negative for chest pain and palpitations.   Gastrointestinal: Negative for abdominal pain, diarrhea, nausea and vomiting.   Endocrine: Negative for cold intolerance, heat intolerance and polydipsia.   Genitourinary: Negative for difficulty urinating, dysuria and hematuria.   Musculoskeletal: Positive for arthralgias. Negative for joint swelling and myalgias.   Skin: Negative for rash and wound.   Allergic/Immunologic: Negative for environmental allergies.   Neurological: Negative for dizziness, syncope and numbness.   Hematological: Does not bruise/bleed easily.   Psychiatric/Behavioral: Negative for dysphoric mood and sleep disturbance. The patient is not nervous/anxious.    All other systems reviewed and are negative.          Objective:  There were no vitals filed for this visit.  There were no vitals filed for this visit.  There is no height or weight on file to calculate BMI.    General: No acute distress.  Resp: normal respiratory effort  Skin: no rashes or wounds; normal turgor  Psych: mood and affect appropriate; recent and remote memory intact      Ortho Exam       Left Knee-  ROM 0-145  Good contraction on quad with good tone and size  Grade 1a Lachman  Negative anterior and posterior drawer  Stable  to varus and valgus at 0 and 30 degrees  No effusion    Imaging:  Three-view x-ray left knee from today's visit, AP, lateral, notch views, ordered and reviewed by me, indication status post ACL revision reconstruction.  Femoral and tibial tunnels are in stable position with good joint space maintained and medial lateral compartments on AP view, mild tunnel widening of femoral and tibial tunnels appreciated.  No evidence of intra-articular loose body.  Compared to prior office x-rays.    Assessment:        1. S/P ACL reconstruction           Plan:          1. Discussed treatment options at length with patient at today's visit.   2. Follow-up as needed should she have any recurrence of symptoms or issues.  She is very happy with her outcome at this time.  3. Patient may perform any activities without restriction      Eleni Vaughan was in agreement with plan and had all questions answered.     Orders:  Orders Placed This Encounter   Procedures   • XR Knee 3 View Left       Medications:  No orders of the defined types were placed in this encounter.      Followup:  Return if symptoms worsen or fail to improve.    Eleni was seen today for follow-up and pain.    Diagnoses and all orders for this visit:    S/P ACL reconstruction  -     XR Knee 3 View Left      By signing my name here, I Jeana Dailey, attest that all documentation on 08/20/19 at 2:38 PM has been prepared under the direction and in the presence of Dr. Matthew Cooper.    I, Dr. Matthew Cooper, personally performed the services described in this documentation, as scribed by Jeana Dailey, in my presence, and it is both accurate and complete.    Dictated utilizing Dragon dictation

## 2019-09-03 ENCOUNTER — OFFICE VISIT (OUTPATIENT)
Dept: OBSTETRICS AND GYNECOLOGY | Age: 44
End: 2019-09-03

## 2019-09-03 VITALS
SYSTOLIC BLOOD PRESSURE: 104 MMHG | DIASTOLIC BLOOD PRESSURE: 70 MMHG | WEIGHT: 198 LBS | HEIGHT: 72 IN | BODY MASS INDEX: 26.82 KG/M2

## 2019-09-03 DIAGNOSIS — Z11.51 SCREENING FOR HPV (HUMAN PAPILLOMAVIRUS): ICD-10-CM

## 2019-09-03 DIAGNOSIS — Z01.419 ENCOUNTER FOR GYNECOLOGICAL EXAMINATION WITHOUT ABNORMAL FINDING: Primary | ICD-10-CM

## 2019-09-03 DIAGNOSIS — Z12.4 SCREENING FOR CERVICAL CANCER: ICD-10-CM

## 2019-09-03 PROBLEM — K42.9 UMBILICAL HERNIA WITHOUT OBSTRUCTION AND WITHOUT GANGRENE: Status: ACTIVE | Noted: 2018-10-16

## 2019-09-03 PROBLEM — K62.5 RECTAL BLEEDING: Status: ACTIVE | Noted: 2019-08-15

## 2019-09-03 PROBLEM — H17.9 CORNEAL SCARRING: Status: ACTIVE | Noted: 2019-09-03

## 2019-09-03 PROCEDURE — 99396 PREV VISIT EST AGE 40-64: CPT | Performed by: OBSTETRICS & GYNECOLOGY

## 2019-09-03 RX ORDER — FLUOXETINE 10 MG/1
TABLET, FILM COATED ORAL
Refills: 3 | COMMUNITY
Start: 2019-08-09 | End: 2019-09-03 | Stop reason: SDUPTHER

## 2019-09-03 NOTE — PROGRESS NOTES
Routine Annual Visit    9/3/2019    Patient: Eleni Vaughan          MR#:[id number]      Chief Complaint   Patient presents with   • Gynecologic Exam     AE today.  MG is due (was scheduled today).   pap = normal, hpv cx due to low volume. She is having a colonoscopy on thursday due to blood in stool.        History of Present Illness    43 y.o. female  who presents for annual exam.   Regular menses , no issues  Taking prozac but notices sometimes misspeaks  Will try 2 weeks off prozac in beginning of cycle to see if prozac related  Kids well  Will schedule mammogram  Has CSC scheduled this week , had some rectal bleeding          Patient's last menstrual period was 2019.  Obstetric History:  OB History      Para Term  AB Living    4 2 2   2 2    SAB TAB Ectopic Molar Multiple Live Births    2         2         Menstrual History:     Patient's last menstrual period was 2019.       Sexual History:       ________________________________________  Patient Active Problem List   Diagnosis   • S/P arthroscopic partial lateral meniscectomy   • S/P ACL reconstruction   • Chronic sprain or strain of lumbar region   • Corneal scarring   • Rectal bleeding   • Umbilical hernia without obstruction and without gangrene       Past Medical History:   Diagnosis Date   • Allergic    • Breast pain in female    • History of chicken pox    • History of heart murmur in childhood    • History of uterine fibroid    • Left knee pain     SCHED SURGERY   • Threatened miscarriage    • Vaginal delivery        Past Surgical History:   Procedure Laterality Date   • DILATATION AND CURETTAGE     • HERNIA REPAIR Right     INGUINAL   • KNEE ACL RECONSTRUCTION Left 2018    Procedure: REVISION KNEE ANTERIOR CRUCIATE LIGAMENT RECONSTRUCTION WITH HAMSTRING AUTOGRAFT, possible bone patella tendon bone allograft, hardware removal. meniscal and cartilage surgery as indicated;  Surgeon: Matthew Cooper MD;   "Location: [facility];  Service: Orthopedics   • KNEE SURGERY Left     ACL recon BTB       Social History     Tobacco Use   Smoking Status Former Smoker   • Years: 5.00   • Types: Cigarettes   • Last attempt to quit:    • Years since quittin.6   Smokeless Tobacco Never Used   Tobacco Comment    OCCASIONAL       has a current medication list which includes the following prescription(s): fluoxetine and ibuprofen.  ________________________________________    Current contraception: vasectomy  History of abnormal Pap smear: no  Family history of Breast cancer: no  Family history of uterine or ovarian cancer: no  Family History of colon cancer/colon polyps: no  History of abnormal mammogram: no      The following portions of the patient's history were reviewed and updated as appropriate: allergies, current medications, past family history, past medical history, past social history, past surgical history and problem list.    Review of Systems    Pertinent items are noted in HPI.     Objective   Physical Exam    /70   Ht 182.9 cm (72\")   Wt 89.8 kg (198 lb)   LMP 2019   Breastfeeding? No   BMI 26.85 kg/m²    BP Readings from Last 3 Encounters:   19 104/70   19 120/78   18 113/70      Wt Readings from Last 3 Encounters:   19 89.8 kg (198 lb)   02/15/19 89.4 kg (197 lb)   19 91.2 kg (201 lb)      BMI: Estimated body mass index is 26.85 kg/m² as calculated from the following:    Height as of this encounter: 182.9 cm (72\").    Weight as of this encounter: 89.8 kg (198 lb).      General:   alert, appears stated age and cooperative   Abdomen: soft, non-tender, without masses or organomegaly   Breast: inspection negative, no nipple discharge or bleeding, no masses or nodularity palpable   Vulva: normal   Vagina: normal mucosa   Cervix: no cervical motion tenderness and no lesions   Uterus: normal size, mobile or non-tender   Adnexa: no mass, fullness, tenderness "     Assessment:    1. Normal annual exam   Assessment     ICD-10-CM ICD-9-CM   1. Encounter for gynecological examination without abnormal finding Z01.419 V72.31   2. Screening for HPV (human papillomavirus) Z11.51 V73.81   3. Screening for cervical cancer Z12.4 V76.2     Plan:    Plan     [x]  Mammogram request made  [x]  PAP done  []  Labs:   []  GC/Chl/TV  []  DEXA scan   []  Referral for colonoscopy:       Eleni was seen today for gynecologic exam.    Diagnoses and all orders for this visit:    Encounter for gynecological examination without abnormal finding    Screening for HPV (human papillomavirus)  -     IGP, Apt HPV,rfx 16 / 18,45    Screening for cervical cancer  -     IGP, Apt HPV,rfx 16 / 18,45            Counseling:  --Nutrition: Stressed importance of moderation and caloric balance, stressed fresh fruit and vegetables  --Exercise: Stressed the importance of regular exercise. 3-5 times weekly   - Discussed screening mammogram recommendations.   --Discussed benefits of screening colonoscopy- age 45 unless FH  --Discussed pap smear screening recommendations

## 2019-09-05 LAB
CYTOLOGIST CVX/VAG CYTO: ABNORMAL
CYTOLOGY CVX/VAG DOC CYTO: ABNORMAL
CYTOLOGY CVX/VAG DOC THIN PREP: ABNORMAL
DX ICD CODE: ABNORMAL
DX ICD CODE: ABNORMAL
HIV 1 & 2 AB SER-IMP: ABNORMAL
HPV I/H RISK 4 DNA CVX QL PROBE+SIG AMP: NEGATIVE
OTHER STN SPEC: ABNORMAL
PATHOLOGIST CVX/VAG CYTO: ABNORMAL
RECOM F/U CVX/VAG CYTO: ABNORMAL
STAT OF ADQ CVX/VAG CYTO-IMP: ABNORMAL

## 2019-09-18 ENCOUNTER — APPOINTMENT (OUTPATIENT)
Dept: WOMENS IMAGING | Facility: HOSPITAL | Age: 44
End: 2019-09-18

## 2019-09-18 ENCOUNTER — PROCEDURE VISIT (OUTPATIENT)
Dept: OBSTETRICS AND GYNECOLOGY | Age: 44
End: 2019-09-18

## 2019-09-18 DIAGNOSIS — Z12.31 VISIT FOR SCREENING MAMMOGRAM: Primary | ICD-10-CM

## 2019-09-18 PROCEDURE — 77067 SCR MAMMO BI INCL CAD: CPT | Performed by: RADIOLOGY

## 2019-09-18 PROCEDURE — 77067 SCR MAMMO BI INCL CAD: CPT | Performed by: OBSTETRICS & GYNECOLOGY

## 2019-11-07 ENCOUNTER — OFFICE VISIT (OUTPATIENT)
Dept: ORTHOPEDIC SURGERY | Facility: CLINIC | Age: 44
End: 2019-11-07

## 2019-11-07 VITALS — BODY MASS INDEX: 26.82 KG/M2 | WEIGHT: 198 LBS | HEIGHT: 72 IN

## 2019-11-07 DIAGNOSIS — R52 PAIN: ICD-10-CM

## 2019-11-07 DIAGNOSIS — M94.262 CHONDROMALACIA OF KNEE, LEFT: ICD-10-CM

## 2019-11-07 DIAGNOSIS — Z98.890 S/P ACL RECONSTRUCTION: Primary | ICD-10-CM

## 2019-11-07 DIAGNOSIS — Z98.890 S/P ARTHROSCOPIC PARTIAL LATERAL MENISCECTOMY: ICD-10-CM

## 2019-11-07 PROCEDURE — 20610 DRAIN/INJ JOINT/BURSA W/O US: CPT | Performed by: ORTHOPAEDIC SURGERY

## 2019-11-07 PROCEDURE — 99213 OFFICE O/P EST LOW 20 MIN: CPT | Performed by: ORTHOPAEDIC SURGERY

## 2019-11-07 PROCEDURE — 73564 X-RAY EXAM KNEE 4 OR MORE: CPT | Performed by: ORTHOPAEDIC SURGERY

## 2019-11-07 RX ORDER — TRIAMCINOLONE ACETONIDE 40 MG/ML
80 INJECTION, SUSPENSION INTRA-ARTICULAR; INTRAMUSCULAR
Status: COMPLETED | OUTPATIENT
Start: 2019-11-07 | End: 2019-11-07

## 2019-11-07 RX ORDER — NAPROXEN 500 MG/1
TABLET ORAL
COMMUNITY
Start: 2019-11-06 | End: 2020-04-03

## 2019-11-07 RX ORDER — LIDOCAINE HYDROCHLORIDE 10 MG/ML
4 INJECTION, SOLUTION EPIDURAL; INFILTRATION; INTRACAUDAL; PERINEURAL
Status: COMPLETED | OUTPATIENT
Start: 2019-11-07 | End: 2019-11-07

## 2019-11-07 RX ADMIN — LIDOCAINE HYDROCHLORIDE 4 ML: 10 INJECTION, SOLUTION EPIDURAL; INFILTRATION; INTRACAUDAL; PERINEURAL at 09:03

## 2019-11-07 RX ADMIN — TRIAMCINOLONE ACETONIDE 80 MG: 40 INJECTION, SUSPENSION INTRA-ARTICULAR; INTRAMUSCULAR at 09:03

## 2019-11-07 NOTE — PROGRESS NOTES
Subjective:     Patient ID: Eleni Vaughan is a 43 y.o. female.    Chief Complaint: Status post left knee ACL reconstruction with hamstring autograft and allograft supplement, partial lateral meniscectomy, DOS 2018    History of Present Illness  Eleni Vaughan returns to clinic today for evaluation of her left knee. She states she was performing an exercises where she was in a plank position when she was pulling her knees up towards her chest. She states she felt uncomfortable during the exercise and started experiencing pain afterwards. She also states she strained her left calf muscle while walking on an incline on the treadmill, she has been evaluated for this and was prescribed naproxen 500mg. Since she performed this exercise, she has been experiencing swelling which has been mildly improved with applying ice.   Today, she rates the pain as 3-4/10 at its worst, describes it as aching in nature.    Localizes pain to the anterior aspect of her knee.     Symptoms are exacerbated with walking .    Denies radiation of pain, denies associated numbness or tingling.    Social History     Occupational History   • Not on file   Tobacco Use   • Smoking status: Former Smoker     Years: 5.00     Types: Cigarettes     Last attempt to quit:      Years since quittin.8   • Smokeless tobacco: Never Used   • Tobacco comment: OCCASIONAL   Substance and Sexual Activity   • Alcohol use: Yes     Alcohol/week: 3.0 oz     Types: 5 Glasses of wine per week     Comment: daily   • Drug use: Yes     Types: Marijuana, Cocaine, LSD     Comment: ACID, H/O OCCASIONAL USE  10 YRS AGO   • Sexual activity: Defer     Partners: Male     Birth control/protection: Partner's vasectomy     Comment: spouse = NA      Past Medical History:   Diagnosis Date   • Allergic    • Breast pain in female    • History of chicken pox    • History of heart murmur in childhood    • History of uterine fibroid    • Left knee pain     SCHED SURGERY    • Threatened miscarriage    • Vaginal delivery      Past Surgical History:   Procedure Laterality Date   • DILATATION AND CURETTAGE     • HERNIA REPAIR Right 1989    INGUINAL   • KNEE ACL RECONSTRUCTION Left 6/18/2018    Procedure: REVISION KNEE ANTERIOR CRUCIATE LIGAMENT RECONSTRUCTION WITH HAMSTRING AUTOGRAFT, possible bone patella tendon bone allograft, hardware removal. meniscal and cartilage surgery as indicated;  Surgeon: Matthew Cooper MD;  Location: [facility];  Service: Orthopedics   • KNEE SURGERY Left 1992    ACL recon BTB       Family History   Problem Relation Age of Onset   • No Known Problems Mother    • No Known Problems Father    • No Known Problems Sister    • No Known Problems Brother    • No Known Problems Daughter    • No Known Problems Son    • Leukemia Maternal Grandmother    • No Known Problems Paternal Grandmother    • No Known Problems Maternal Aunt    • No Known Problems Paternal Aunt    • BRCA 1/2 Neg Hx    • Breast cancer Neg Hx    • Colon cancer Neg Hx    • Endometrial cancer Neg Hx    • Ovarian cancer Neg Hx    • Malig Hyperthermia Neg Hx          Review of Systems   Constitutional: Negative for chills, diaphoresis, fever and unexpected weight change.   HENT: Negative for hearing loss, nosebleeds, sore throat and tinnitus.    Eyes: Negative for pain and visual disturbance.   Respiratory: Negative for cough, shortness of breath and wheezing.    Cardiovascular: Negative for chest pain and palpitations.   Gastrointestinal: Negative for abdominal pain, diarrhea, nausea and vomiting.   Endocrine: Negative for cold intolerance, heat intolerance and polydipsia.   Genitourinary: Negative for difficulty urinating, dysuria and hematuria.   Musculoskeletal: Positive for arthralgias, joint swelling and myalgias.   Skin: Negative for rash and wound.   Allergic/Immunologic: Negative for environmental allergies.   Neurological: Negative for dizziness, syncope and numbness.   Hematological: Does  "not bruise/bleed easily.   Psychiatric/Behavioral: Negative for dysphoric mood and sleep disturbance. The patient is not nervous/anxious.            Objective:  Vitals:    11/07/19 0808   Weight: 89.8 kg (198 lb)   Height: 182.9 cm (72\")         11/07/19 0808   Weight: 89.8 kg (198 lb)     Body mass index is 26.85 kg/m².     General: No acute distress.  Resp: normal respiratory effort  Skin: no rashes or wounds; normal turgor  Psych: mood and affect appropriate; recent and remote memory intact      Ortho Exam       Left Knee-    ROM 0-145 degrees  5/5 on flexion  5/5 on extension  Patellofemoral click on extension  Negative drop home  Negative Paco  No joint line pain  Effusion- moderate   Grade 1A Lachman  No opening on varus and valgus stress at 0 and 30  Active patellar compression test- positive   Log roll-  negative  Stinchfield-  negative  Positive sensation light tough all distributions symmetric to contralateral side  Brisk cap refill all digits  2+ dorsalis pedis pulse    Imaging:    Left Knee X-Ray  Indication: Pain    AP, Lateral, [place] and [place] views    Findings:  No fracture  Mild joint space narrowing of both medial and lateral compartments with some irregularity of the lateral femoral condyle noted particularly on notch views, multiple ACL tunnels on both tibia and femur are noted to be in stable position and alignment with no significant additional widening in comparison to prior x-rays from August, no evidence of radiopaque intra-articular loose body.      Assessment:        1. S/P ACL reconstruction    2. S/P arthroscopic partial lateral meniscectomy    3. Chondromalacia of knee, left    4. Pain           Plan:          1. Discussed treatment options at length with patient at today's visit.   2. Advised patient to exercise caution when performing exercises beyond 90 degrees of flexion, letting pain and swelling be her guide.   3. Patient would like to proceed with cortisone injection today " to the left knee. Recommended limited use of affected extremity for the next 24 hours to only essential activites other than work on general active and passive motion. Recommended supplementing with ice and soft tissue massage. Discussed with patient that they should see results in 5-7 days, if no improvement in 5-6 weeks I have asked them to call the office to review other options. Patient should call office immediately if they notice redness, warmth, fevers, chills, or residual numbness or tingling for greater than 6 hours after injection.         Eleni Vaughan  was in agreement with plan and had all questions answered.     Orders:  Orders Placed This Encounter   Procedures   • Large Joint Arthrocentesis: L knee   • XR Knee 4+ View Left       Medications:  No orders of the defined types were placed in this encounter.      Followup:  Return if symptoms worsen or fail to improve.    Eleni was seen today for follow-up, edema and pain.    Diagnoses and all orders for this visit:    S/P ACL reconstruction  -     XR Knee 4+ View Left    S/P arthroscopic partial lateral meniscectomy    Chondromalacia of knee, left  -     Large Joint Arthrocentesis: L knee    Pain  -     Large Joint Arthrocentesis: L knee      By signing my name here, I Jeana Dailey, attest that all documentation on 11/07/19 at 1:00 PM has been prepared under the direction and in the presence of Dr. Matthew Cooper.    I, Dr. Matthew Cooper, personally performed the services described in this documentation, as scribed by Jeana Dailey, in my presence, and it is both accurate and complete.      Dictated utilizing Dragon dictation

## 2019-11-07 NOTE — PROGRESS NOTES
Procedure   Large Joint Arthrocentesis: L knee  Date/Time: 11/7/2019 9:03 AM  Consent given by: patient  Site marked: site marked  Timeout: Immediately prior to procedure a time out was called to verify the correct patient, procedure, equipment, support staff and site/side marked as required   Supporting Documentation  Indications: pain   Procedure Details  Location: knee - L knee  Preparation: Patient was prepped and draped in the usual sterile fashion  Needle size: 22 G  Approach: superior  Medications administered: 4 mL lidocaine PF 1% 1 %; 80 mg triamcinolone acetonide 40 MG/ML  Patient tolerance: patient tolerated the procedure well with no immediate complications

## 2020-03-12 ENCOUNTER — TELEPHONE (OUTPATIENT)
Dept: ORTHOPEDIC SURGERY | Facility: CLINIC | Age: 45
End: 2020-03-12

## 2020-03-12 RX ORDER — MELOXICAM 15 MG/1
15 TABLET ORAL DAILY
Qty: 30 TABLET | Refills: 0 | Status: SHIPPED | OUTPATIENT
Start: 2020-03-12 | End: 2020-04-03

## 2020-03-12 NOTE — TELEPHONE ENCOUNTER
Eleni called today complaining of increased pain in her left knee that she had ACL reconstruction on in 2018.  Says it is worse with certain work outs she is doing.  She was asking for suggestions, but I discussed with her that with the sudden onset of pain and that she hasn't been seen since 11/2019 she should probably be scheduled.  I have put her on for next Thursday at [facility].

## 2020-04-02 ENCOUNTER — TELEPHONE (OUTPATIENT)
Dept: ORTHOPEDIC SURGERY | Facility: CLINIC | Age: 45
End: 2020-04-02

## 2020-04-02 NOTE — TELEPHONE ENCOUNTER
Left knee is having pain, sounds like chips crunching when she goes up the stairs.  She worked out with lower body 3 days ago complete body and did upper body yesterday.  She has been taking NSAID every day but having a lot of sensitivity and pain with the knee.  The knee feels tight.    Do you have any other recommendations for her?  She only has 7 days of NSAID left and it has no refills.  She is willing to do a video visit if need be.

## 2020-04-03 ENCOUNTER — TELEMEDICINE (OUTPATIENT)
Dept: ORTHOPEDIC SURGERY | Facility: CLINIC | Age: 45
End: 2020-04-03

## 2020-04-03 DIAGNOSIS — M17.12 PATELLOFEMORAL ARTHRITIS OF LEFT KNEE: ICD-10-CM

## 2020-04-03 DIAGNOSIS — Z98.890 S/P ARTHROSCOPIC PARTIAL LATERAL MENISCECTOMY: ICD-10-CM

## 2020-04-03 DIAGNOSIS — Z98.890 S/P ACL RECONSTRUCTION: Primary | ICD-10-CM

## 2020-04-03 PROCEDURE — 99213 OFFICE O/P EST LOW 20 MIN: CPT | Performed by: ORTHOPAEDIC SURGERY

## 2020-04-03 RX ORDER — MELOXICAM 15 MG/1
TABLET ORAL
Qty: 30 TABLET | Refills: 0 | Status: SHIPPED | OUTPATIENT
Start: 2020-04-03 | End: 2020-05-08

## 2020-04-03 NOTE — PROGRESS NOTES
Subjective:    This was an audio and video enabled telemedicine encounter.     Patient ID: Eleni Vaughan is a 44 y.o. female.    Chief Complaint:  Follow-up left knee pain, patellofemoral arthritis  Status post left knee ACL reconstruction with hamstring autograft and allograft supplement, partial lateral meniscectomy, 2018    History of Present Illness  Eleni Vaughan returns via video visit today for evaluation of her left knee, notes that she has had increased pain over the last 5 to 7 days over the anterior aspect of her knee, rates it as a 6-8 out of 10 and aching in nature with associated crepitus particular with ambulating up and down stairs.  She did have a workout with deep lunges and squats on Tuesday and since then has noted some increased level of pain.  It has slightly improved today with rest and activity modification.  Denies radiation of pain, denies associated numbness or tingling.  She did note good improvement with intra-articular cortisone injection that lasted for approximately 3 months with greater than 90% relief of pain.     Social History     Occupational History   • Not on file   Tobacco Use   • Smoking status: Former Smoker     Years: 5.00     Types: Cigarettes     Last attempt to quit:      Years since quittin.2   • Smokeless tobacco: Never Used   • Tobacco comment: OCCASIONAL   Substance and Sexual Activity   • Alcohol use: Yes     Alcohol/week: 5.0 standard drinks     Types: 5 Glasses of wine per week     Comment: daily   • Drug use: Yes     Types: Marijuana, Cocaine, LSD     Comment: ACID, H/O OCCASIONAL USE  10 YRS AGO   • Sexual activity: Defer     Partners: Male     Birth control/protection: Partner's vasectomy     Comment: spouse = NA      Past Medical History:   Diagnosis Date   • Allergic    • Breast pain in female    • History of chicken pox    • History of heart murmur in childhood    • History of uterine fibroid    • Left knee pain     SCHED SURGERY   •  Threatened miscarriage    • Vaginal delivery      Past Surgical History:   Procedure Laterality Date   • DILATATION AND CURETTAGE     • HERNIA REPAIR Right 1989    INGUINAL   • KNEE ACL RECONSTRUCTION Left 6/18/2018    Procedure: REVISION KNEE ANTERIOR CRUCIATE LIGAMENT RECONSTRUCTION WITH HAMSTRING AUTOGRAFT, possible bone patella tendon bone allograft, hardware removal. meniscal and cartilage surgery as indicated;  Surgeon: Matthew Cooper MD;  Location: [facility];  Service: Orthopedics   • KNEE SURGERY Left 1992    ACL recon BTB       Family History   Problem Relation Age of Onset   • No Known Problems Mother    • No Known Problems Father    • No Known Problems Sister    • No Known Problems Brother    • No Known Problems Daughter    • No Known Problems Son    • Leukemia Maternal Grandmother    • No Known Problems Paternal Grandmother    • No Known Problems Maternal Aunt    • No Known Problems Paternal Aunt    • BRCA 1/2 Neg Hx    • Breast cancer Neg Hx    • Colon cancer Neg Hx    • Endometrial cancer Neg Hx    • Ovarian cancer Neg Hx    • Malig Hyperthermia Neg Hx          Review of Systems   Musculoskeletal: Positive for arthralgias and myalgias.           Objective:  There were no vitals filed for this visit.  There were no vitals filed for this visit.  There is no height or weight on file to calculate BMI.  General: No acute distress.  Resp: normal respiratory effort  Skin: no rashes or wounds; normal turgor  Psych: mood and affect appropriate; recent and remote memory intact        Ortho Exam     Left knee-range of motion 0 to 140 degrees, good apparent strength on both flexion and extension.  Crepitus noted on flexion past 95 degrees, localizes pain primarily to the anterior aspect of the knee, no obvious effusion appreciated.  Prior incisions well-healed.    Imaging:    Assessment:        1. S/P ACL reconstruction    2. S/P arthroscopic partial lateral meniscectomy    3. Patellofemoral arthritis of left  knee           Plan:          1. Discussed treatment options at length with patient at today's visit.  Given the fact the patient does have known patellofemoral arthritis based on diagnostic arthroscopic exam at time of surgery back in 2018 as well as increasing symptoms localized to this region with failure of other conservative treatments including meloxicam, oral over-the-counter anti-inflammatory medications, and short-term improvement with intra-articular injection, we discussed options and patient wished to proceed with Visco supplement injection to the left knee.  She is also failed extensive home exercise program as well as prior physical therapy with persistence of symptoms.  Minimal improvement with use of knee sleeve.  2. We also discussed about transition to limited impact activities including swimming, patient does have a pool, as well as stationary bike, and straight leg raises with resistance as well as hip and core strengthening program.  Avoidance of deep flexion activities including deep squats and deep lunges to avoid agitating the patellofemoral joint.  3. Recommended consideration of supplements including glucosamine/chondroitin and tumeric. Recommended use as indicated on package for 4 weeks and then discontinuing for 2 weeks to assess for any clinical improvement with use.       Eleni Vaughan was in agreement with plan and had all questions answered.     Orders:  Orders Placed This Encounter   Procedures   • Visco Treatment       Medications:  No orders of the defined types were placed in this encounter.      Followup:  No follow-ups on file.    Eleni was seen today for knee pain and joint swelling.    Diagnoses and all orders for this visit:    S/P ACL reconstruction    S/P arthroscopic partial lateral meniscectomy    Patellofemoral arthritis of left knee  -     Visco Treatment; Future          Dictated utilizing Dragon dictation

## 2020-05-08 RX ORDER — MELOXICAM 15 MG/1
TABLET ORAL
Qty: 30 TABLET | Refills: 0 | Status: SHIPPED | OUTPATIENT
Start: 2020-05-08 | End: 2020-09-10

## 2020-09-10 ENCOUNTER — OFFICE VISIT (OUTPATIENT)
Dept: OBSTETRICS AND GYNECOLOGY | Age: 45
End: 2020-09-10

## 2020-09-10 VITALS
BODY MASS INDEX: 23.98 KG/M2 | DIASTOLIC BLOOD PRESSURE: 78 MMHG | SYSTOLIC BLOOD PRESSURE: 112 MMHG | HEIGHT: 72 IN | WEIGHT: 177 LBS

## 2020-09-10 DIAGNOSIS — Z11.51 SCREENING FOR HPV (HUMAN PAPILLOMAVIRUS): ICD-10-CM

## 2020-09-10 DIAGNOSIS — R63.4 WEIGHT LOSS: ICD-10-CM

## 2020-09-10 DIAGNOSIS — Z12.39 SCREENING FOR BREAST CANCER: ICD-10-CM

## 2020-09-10 DIAGNOSIS — Z01.419 ENCOUNTER FOR GYNECOLOGICAL EXAMINATION (GENERAL) (ROUTINE) WITHOUT ABNORMAL FINDINGS: Primary | ICD-10-CM

## 2020-09-10 DIAGNOSIS — Z12.4 SCREENING FOR CERVICAL CANCER: ICD-10-CM

## 2020-09-10 LAB — TSH SERPL DL<=0.005 MIU/L-ACNC: 2.46 UIU/ML (ref 0.27–4.2)

## 2020-09-10 PROCEDURE — 99396 PREV VISIT EST AGE 40-64: CPT | Performed by: OBSTETRICS & GYNECOLOGY

## 2020-09-10 NOTE — PROGRESS NOTES
Routine Annual Visit    9/10/2020    Patient: Eleni Vaughan          MR#:[id number]      Chief Complaint   Patient presents with   • Gynecologic Exam     AE today.  Last pap 2019 = ASCUS, neg hpv.  Last MG 2019.  Cycles are regular.  Contraception = vasectomy.  Son 8, daughter 6.  Perimenopausal symptoms, weight gain.        History of Present Illness    44 y.o. female  who presents for annual exam.   Doing well, kids well  Lost 20 lbs but now plateau  Working out twice daily and intermittent fasting  VAS  Cycles regular with no issues  Pap last year ascus ,hpv neg  Repeat pap today  mammo due soon, will schedule on way out  Recent CSC          Patient's last menstrual period was 2020.  Obstetric History:  OB History        4    Para   2    Term   2            AB   2    Living   2       SAB   2    TAB        Ectopic        Molar        Multiple        Live Births   2               Menstrual History:     Patient's last menstrual period was 2020.       Sexual History:       ________________________________________  Patient Active Problem List   Diagnosis   • S/P arthroscopic partial lateral meniscectomy   • S/P ACL reconstruction   • Chronic sprain or strain of lumbar region   • Corneal scarring   • Rectal bleeding   • Umbilical hernia without obstruction and without gangrene   • Chondromalacia of knee, left   • Patellofemoral arthritis of left knee       Past Medical History:   Diagnosis Date   • Allergic    • Breast pain in female    • History of chicken pox    • History of heart murmur in childhood    • History of uterine fibroid    • Left knee pain     SCHED SURGERY   • Threatened miscarriage    • Vaginal delivery        Past Surgical History:   Procedure Laterality Date   • DILATATION AND CURETTAGE     • HERNIA REPAIR Right     INGUINAL   • KNEE ACL RECONSTRUCTION Left 2018    Procedure: REVISION KNEE ANTERIOR CRUCIATE LIGAMENT RECONSTRUCTION WITH HAMSTRING  "AUTOGRAFT, possible bone patella tendon bone allograft, hardware removal. meniscal and cartilage surgery as indicated;  Surgeon: Matthew Cooper MD;  Location: [facility];  Service: Orthopedics   • KNEE SURGERY Left     ACL recon BTB       Social History     Tobacco Use   Smoking Status Former Smoker   • Years: 5.00   • Types: Cigarettes   • Last attempt to quit:    • Years since quittin.7   Smokeless Tobacco Never Used   Tobacco Comment    OCCASIONAL       currently has no medications in their medication list.  ________________________________________    Current contraception: vasectomy  History of abnormal Pap smear: no  Family history of Breast cancer: no  Family history of uterine or ovarian cancer: no  Family History of colon cancer/colon polyps: no  History of abnormal mammogram: no      The following portions of the patient's history were reviewed and updated as appropriate: allergies, current medications, past family history, past medical history, past social history, past surgical history and problem list.    Review of Systems    Pertinent items are noted in HPI.     Objective   Physical Exam    /78   Ht 182.9 cm (72\")   Wt 80.3 kg (177 lb)   LMP 2020   Breastfeeding No   BMI 24.01 kg/m²    BP Readings from Last 3 Encounters:   09/10/20 112/78   19 104/70   19 120/78      Wt Readings from Last 3 Encounters:   09/10/20 80.3 kg (177 lb)   19 89.8 kg (198 lb)   19 89.8 kg (198 lb)      BMI: Estimated body mass index is 24.01 kg/m² as calculated from the following:    Height as of this encounter: 182.9 cm (72\").    Weight as of this encounter: 80.3 kg (177 lb).      General:   alert, appears stated age and cooperative   Abdomen: soft, non-tender, without masses or organomegaly   Breast: inspection negative, no nipple discharge or bleeding, no masses or nodularity palpable   Vulva: normal   Vagina: normal mucosa   Cervix: no cervical motion tenderness, no " lesions and uterine prolapse and cystocele present   Uterus: normal size, mobile or non-tender   Adnexa: no mass, fullness, tenderness     Assessment:    1. Normal annual exam   Assessment     ICD-10-CM ICD-9-CM   1. Encounter for gynecological examination (general) (routine) without abnormal findings Z01.419 V72.31   2. Weight loss R63.4 783.21   3. Screening for breast cancer Z12.39 V76.10   4. Screening for cervical cancer Z12.4 V76.2   5. Screening for HPV (human papillomavirus) Z11.51 V73.81     Plan:    Plan     [x]  Mammogram request made  [x]  PAP done  []  Labs:   []  GC/Chl/TV  []  DEXA scan   []  Referral for colonoscopy:       Eleni was seen today for gynecologic exam.    Diagnoses and all orders for this visit:    Encounter for gynecological examination (general) (routine) without abnormal findings    Weight loss  -     TSH    Screening for breast cancer  -     Mammo Screening Bilateral With CAD; Future    Screening for cervical cancer  -     IGP, Apt HPV,rfx 16 / 18,45    Screening for HPV (human papillomavirus)  -     IGP, Apt HPV,rfx 16 / 18,45      Some significant prolapse on exam, pt is asx      Counseling:  --Nutrition: Stressed importance of moderation and caloric balance, stressed fresh fruit and vegetables  --Exercise: Stressed the importance of regular exercise. 3-5 times weekly   - Discussed screening mammogram recommendations.   --Discussed benefits of screening colonoscopy- age 45 unless FH  --Discussed pap smear screening recommendations

## 2020-09-15 LAB
CYTOLOGIST CVX/VAG CYTO: NORMAL
CYTOLOGY CVX/VAG DOC CYTO: NORMAL
CYTOLOGY CVX/VAG DOC THIN PREP: NORMAL
DX ICD CODE: NORMAL
HIV 1 & 2 AB SER-IMP: NORMAL
HPV I/H RISK 4 DNA CVX QL PROBE+SIG AMP: NEGATIVE
OTHER STN SPEC: NORMAL
STAT OF ADQ CVX/VAG CYTO-IMP: NORMAL

## 2020-11-18 ENCOUNTER — TELEPHONE (OUTPATIENT)
Dept: ORTHOPEDIC SURGERY | Facility: CLINIC | Age: 45
End: 2020-11-18

## 2020-11-18 DIAGNOSIS — Z98.890 S/P ACL RECONSTRUCTION: Primary | ICD-10-CM

## 2020-11-18 DIAGNOSIS — M62.81 QUADRICEPS WEAKNESS: ICD-10-CM

## 2020-11-18 NOTE — TELEPHONE ENCOUNTER
Tell her we should start with 4 weeks of rehab and then I need to see her in about 4-6 weeks once she has completed that so we can eval progress. Ill put order in for PT

## 2020-11-18 NOTE — TELEPHONE ENCOUNTER
PT WOULD LIKE TO DISCUSS WEAKNESS IN HER LEFT LEG WITH DR. HORTA.  SHE WOULD LIKE TO KNOW IF THERE ARE ANY EXERCISES SHE CAN DO, OR SHOULD SHE GO BACK TO REHAB.    MICHAEL GERONIMO MAY BE REACHED AT:  [phone]

## 2020-11-20 ENCOUNTER — TREATMENT (OUTPATIENT)
Dept: PHYSICAL THERAPY | Facility: CLINIC | Age: 45
End: 2020-11-20

## 2020-11-20 DIAGNOSIS — M17.12 PATELLOFEMORAL ARTHRITIS OF LEFT KNEE: Primary | ICD-10-CM

## 2020-11-20 DIAGNOSIS — M62.81 QUADRICEPS WEAKNESS: ICD-10-CM

## 2020-11-20 DIAGNOSIS — M94.262 CHONDROMALACIA OF KNEE, LEFT: ICD-10-CM

## 2020-11-20 PROCEDURE — 97035 APP MDLTY 1+ULTRASOUND EA 15: CPT | Performed by: PHYSICAL THERAPIST

## 2020-11-20 PROCEDURE — 97110 THERAPEUTIC EXERCISES: CPT | Performed by: PHYSICAL THERAPIST

## 2020-11-20 PROCEDURE — 97530 THERAPEUTIC ACTIVITIES: CPT | Performed by: PHYSICAL THERAPIST

## 2020-11-20 PROCEDURE — 97161 PT EVAL LOW COMPLEX 20 MIN: CPT | Performed by: PHYSICAL THERAPIST

## 2020-11-20 NOTE — PROGRESS NOTES
Physical Therapy Initial Evaluation and Plan of Care    Patient: Eleni aVughan   : 1975  Diagnosis/ICD-10 Code:  Patellofemoral arthritis of left knee [M17.12]  Referring practitioner: Matthew Cooper MD    Subjective Evaluation    History of Present Illness  Onset date: increased pain the last 4 months   Date of surgery: 10/9/2018  Mechanism of injury: Pt is a 44 y.o WF who reports to the clinic with increase left knee pain and swelling.  Pt is S/P left ACL 2018.  Pt denies having any specific injury to her left knee since her surgery.  Pt did F/U with Dr. Cooper one year ago due to increased knee pain-received cortisone shot and was told her ligament was good.  Pt has been trying to exercise more and strengthen, but she has been having more left knee pain and she is trying to modify her work-outs to not cause pain.  Pt has been walking and states that does not hurt her knee.         Patient Occupation: works from home  Quality of life: good    Pain  Current pain ratin  At worst pain ratin  Location: left lateral , posterior medial knee   Quality: sharp and dull ache (tightness from swelling )  Relieving factors: ice, support, rest and medications (ibuprofen )  Aggravating factors: squatting and stairs (running on incline)    Hand dominance: right    Diagnostic Tests  No diagnostic tests performed    Treatments  Previous treatment: injection treatment and physical therapy (2018 S/P left ACL )  Current treatment: medication  Patient Goals  Patient goals for therapy: decreased pain and increased strength             Objective          Tenderness   Left Knee   Tenderness in the inferior patella, lateral joint line, lateral patella, medial joint line and medial patella.     Active Range of Motion   Left Knee   Flexion: 147 degrees   Extension: 0 degrees     Right Knee   Flexion: 150 degrees   Extension: 0 degrees     Patellar Mobility   Left Knee Patellar tendons within functional limits  include the medial, lateral, superior and inferior.     Patellar Static Positioning     Additional Patellar Static Positioning Details  During left knee TKE left patellar glide and palpable and audible PF crepitus     Strength/Myotome Testing     Left Hip   Planes of Motion   Flexion: 4+  Extension: 4  Abduction: 4+  Adduction: 4    Right Hip   Planes of Motion   Flexion: 5  Extension: 4+  Abduction: 5  Adduction: 5    Left Knee   Flexion: 4+  Extension: 4+  Quadriceps contraction: fair    Right Knee   Flexion: 5  Extension: 5  Quadriceps contraction: good    Tests     Left Hip   90/90 SLR: Negative.   SLR: Negative.     Right Hip   90/90 SLR: Negative.  SLR: Negative.     Left Knee   Positive patella-femoral grind.   Negative anterior drawer, lateral Paco, medial Paco, posterior drawer, valgus stress test at 0 degrees, valgus stress test at 30 degrees, varus stress test at 0 degrees and varus stress test at 30 degrees.     Right Knee   Negative anterior drawer, lateral Paco, medial Paco, patella-femoral grind, posterior drawer, valgus stress test at 0 degrees, valgus stress test at 30 degrees, varus stress test at 0 degrees and varus stress test at 30 degrees.           Assessment & Plan     Assessment  Impairments: impaired physical strength, lacks appropriate home exercise program, pain with function and weight-bearing intolerance  Assessment details: Pt is a 43 y/o WF who reports to the clinic with c/o left knee pain, tenderness, knee effusion, left hip and Quad/VMO weakness, PF crepitus and increased pain and weakness with deep squatting, steps, and running.      Prognosis: good  Functional Limitations: uncomfortable because of pain and stooping  Goals  Plan Goals: STGs: 4-6 weeks  1. Decrease left  knee pain 3/10 with steps and exercising.    2.  Decrease left knee medial and lateral patella tenderness to minimal     3.  Pt Independent with HEP.  4.  Increase left VMO and Quad strength to 5/5     5.  Increase left hip flex, add, and ext strength to 4+-5/5.        Plan  Therapy options: will be seen for skilled physical therapy services  Planned modality interventions: cryotherapy, electrical stimulation/Russian stimulation, thermotherapy (hydrocollator packs) and ultrasound  Planned therapy interventions: home exercise program, flexibility, strengthening, stretching, therapeutic activities and neuromuscular re-education  Other planned therapy interventions: KT taping   Duration in visits: 6  Treatment plan discussed with: patient        Manual Therapy:         mins  99137;  Therapeutic Exercise:   22      mins  29407;     Neuromuscular Shlomo:        mins  57923;    Therapeutic Activity:    10      mins  74413;     Gait Training:           mins  35159;     Ultrasound:     8     mins  07106;    Electrical Stimulation:         mins  21286 ( );  Dry Needling          mins self-pay    Timed Treatment:   40   mins   Total Treatment:     60   mins    PT SIGNATURE: Cheri Granado, PT   DATE TREATMENT INITIATED: 11/20/2020    Initial Certification  Certification Period: 2/18/2021  I certify that the therapy services are furnished while this patient is under my care.  The services outlined above are required by this patient, and will be reviewed every 90 days.     PHYSICIAN: Matthew Cooper MD      DATE:     Please sign and return via fax to [phone].. Thank you, [facility].

## 2020-11-20 NOTE — PATIENT INSTRUCTIONS
Access Code: [email or web address]   URL: [email or web address]   Date: 11/20/2020   Prepared by: Cheri Granado     Exercises  Seated Long Arc Quad with Hip Adduction - 15 reps - 5 sec hold - 1x daily - 7x weekly  Wall Squat - 10 reps - 5-10 sec hold - 1x daily - 7x weekly  Straight Leg Raise with External Rotation - 25 reps - 1x daily - 7x weekly  Sidelying Hip Adduction - 25 reps - 1x daily - 7x weekly  Prone Hip Extension - 25 reps - 1 sets - 1x daily - 7x weekly  Prone Hip Extension with Bent Knee - 15 reps - 1 sets - 1x daily - 7x weekly  Short Arc Quad with Ball Squeeze - 25 reps - 1 sets - 5 sec hold - 1x daily - 7x weekly  Supine Bridge with Mini Swiss Ball Between Knees - 25 reps - 1 sets - 5 sec hold - 1x daily - 7x weekly

## 2020-11-24 ENCOUNTER — TREATMENT (OUTPATIENT)
Dept: PHYSICAL THERAPY | Facility: CLINIC | Age: 45
End: 2020-11-24

## 2020-11-24 PROCEDURE — 97110 THERAPEUTIC EXERCISES: CPT | Performed by: PHYSICAL THERAPIST

## 2020-11-24 PROCEDURE — 97112 NEUROMUSCULAR REEDUCATION: CPT | Performed by: PHYSICAL THERAPIST

## 2020-11-24 NOTE — PROGRESS NOTES
Physical Therapy Daily Progress Note    Visit # : 2  Eleni Vaughan reports: she was sore after last visit.  She did have some tenderness on the inside of the knee cap.      Subjective     Objective   See Exercise, Manual, and Modality Logs for complete treatment.     Pt with mild point tenderness over the left medial knee joint line and patella       Assessment & Plan     Assessment  Assessment details: Pt is tolerating treatment well with some PF crepitus with LAQ and leg press.  Limited TKE to 90-30 degrees with slight crepitus present.  Pt was able to perform all exercises without c/o pain.  Pt did c/o quad fatigue.  Will continue to see pt 1-2x/week for exercise progression.  May try KT taping for PF pain next visit.          Progress per Plan of Care           Manual Therapy:         mins  52718;  Therapeutic Exercise:   35      mins  64280;     Neuromuscular Shlomo:  10      mins  34837;    Therapeutic Activity:          mins  20988;     Gait Training:           mins  51128;     Ultrasound:    8      mins  44839; US performed by PT Tech    Electrical Stimulation:         mins  80045 ( );  Dry Needling          mins self-pay    Timed Treatment:  53    mins   Total Treatment:     55   mins    Cheri Granado, PT  Physical Therapist

## 2020-12-03 ENCOUNTER — PROCEDURE VISIT (OUTPATIENT)
Dept: OBSTETRICS AND GYNECOLOGY | Age: 45
End: 2020-12-03

## 2020-12-03 ENCOUNTER — APPOINTMENT (OUTPATIENT)
Dept: WOMENS IMAGING | Facility: HOSPITAL | Age: 45
End: 2020-12-03

## 2020-12-03 DIAGNOSIS — Z12.31 VISIT FOR SCREENING MAMMOGRAM: Primary | ICD-10-CM

## 2020-12-03 PROCEDURE — 77067 SCR MAMMO BI INCL CAD: CPT | Performed by: RADIOLOGY

## 2020-12-03 PROCEDURE — 77067 SCR MAMMO BI INCL CAD: CPT | Performed by: OBSTETRICS & GYNECOLOGY

## 2020-12-10 ENCOUNTER — TREATMENT (OUTPATIENT)
Dept: PHYSICAL THERAPY | Facility: CLINIC | Age: 45
End: 2020-12-10

## 2020-12-10 ENCOUNTER — TELEPHONE (OUTPATIENT)
Dept: OBSTETRICS AND GYNECOLOGY | Age: 45
End: 2020-12-10

## 2020-12-10 DIAGNOSIS — M94.262 CHONDROMALACIA OF KNEE, LEFT: ICD-10-CM

## 2020-12-10 DIAGNOSIS — M62.81 QUADRICEPS WEAKNESS: ICD-10-CM

## 2020-12-10 DIAGNOSIS — M17.12 PATELLOFEMORAL ARTHRITIS OF LEFT KNEE: Primary | ICD-10-CM

## 2020-12-10 PROCEDURE — 97112 NEUROMUSCULAR REEDUCATION: CPT | Performed by: PHYSICAL THERAPIST

## 2020-12-10 PROCEDURE — 97110 THERAPEUTIC EXERCISES: CPT | Performed by: PHYSICAL THERAPIST

## 2020-12-10 PROCEDURE — 97035 APP MDLTY 1+ULTRASOUND EA 15: CPT | Performed by: PHYSICAL THERAPIST

## 2020-12-10 NOTE — PROGRESS NOTES
Physical Therapy Daily Progress Note    Visit # : 3  Eleni Vaughan reports: her knee is doing pretty good.  Pt states she has not had time to do her exercises at home this week since Sunday.      Subjective     Objective   See Exercise, Manual, and Modality Logs for complete treatment.     Isolated point tenderness over left medial knee knee joint and patella       Assessment & Plan     Assessment  Assessment details: Pt is tolerating treatment well with improving tolerance to strengthening exercises with minimal c/o pain.  Added ankle weights to leg lifts and reps to wall slides.  Pt still has PF crepitus during the last 20-30 degrees of knee extension.  Added KT taping to lateral patella for improved tracking during TKE.  Will continue to see pt once per week for strengthening and modalities PRN for pain.          Progress per Plan of Care           Manual Therapy:         mins  14456;  Therapeutic Exercise:   40      mins  62781;     Neuromuscular Shlomo:  15      mins  78999;    Therapeutic Activity:          mins  49826;     Gait Training:           mins  03535;     Ultrasound:     8     mins  80539;    Electrical Stimulation:         mins  55591 ( );  Dry Needling          mins self-pay    Timed Treatment:  63    mins   Total Treatment:     70   mins    Cheri Granado, PT  Physical Therapist

## 2020-12-10 NOTE — TELEPHONE ENCOUNTER
----- Message from Eleni Vaughan sent at 12/9/2020  9:13 PM EST -----  Regarding: Test Results Question  Contact: [phone]  I have a question about MAMMO SCREENING BILATERAL W CAD resulted on 12/3/20.    I can't open the results.

## 2020-12-14 NOTE — TELEPHONE ENCOUNTER
Patient returned your call. (you were with a patient)     I let her know that I would send a message back to you so you could reach back out to her.      Call back number: [phone]

## 2020-12-17 ENCOUNTER — TREATMENT (OUTPATIENT)
Dept: PHYSICAL THERAPY | Facility: CLINIC | Age: 45
End: 2020-12-17

## 2020-12-17 DIAGNOSIS — M94.262 CHONDROMALACIA OF KNEE, LEFT: ICD-10-CM

## 2020-12-17 DIAGNOSIS — M62.81 QUADRICEPS WEAKNESS: ICD-10-CM

## 2020-12-17 DIAGNOSIS — M17.12 PATELLOFEMORAL ARTHRITIS OF LEFT KNEE: Primary | ICD-10-CM

## 2020-12-17 PROCEDURE — 97110 THERAPEUTIC EXERCISES: CPT | Performed by: PHYSICAL THERAPIST

## 2020-12-17 PROCEDURE — 97112 NEUROMUSCULAR REEDUCATION: CPT | Performed by: PHYSICAL THERAPIST

## 2020-12-17 NOTE — PROGRESS NOTES
Physical Therapy Daily Progress Note    Visit # : 4  Eleni Vaughan reports: her knee is sore, but she just did her exercises yesterday.      Subjective     Objective          Strength/Myotome Testing     Left Hip   Planes of Motion   Flexion: 5  Extension: 4  Abduction: 5  Adduction: 5    Left Knee   Flexion: 5  Extension: 5    Additional Strength Details  Left hip ext with knee bent 4/5, knee straight 5/5       See Exercise, Manual, and Modality Logs for complete treatment.     Pt with minimal point tenderness over the left medial and lateral patella.      Assessment & Plan     Assessment  Assessment details: Pt is tolerating treatment well with improving tolerance to strengthening exercises with minimal c/o pain.  Pt was able to increase her reps with leg lifts.  Pt was able to maintain SLB during balance board for 90 secs without LOB.   Pt still has PF crepitus during the last 20 degrees of knee extension, but less pain and tenderness surrounding the patella.   Will continue to see pt once per week for strengthening and modalities PRN for pain.        Progress per Plan of Care         Manual Therapy:         mins  05925;  Therapeutic Exercise:    35     mins  59741;     Neuromuscular Shlomo:  15      mins  29967;    Therapeutic Activity:          mins  53179;     Gait Training:           mins  15042;     Ultrasound:          mins  69373;    Electrical Stimulation:         mins  03897 ( );  Dry Needling          mins self-pay    Timed Treatment:  50    mins   Total Treatment:    60    mins    Cheri Granado, PT  Physical Therapist

## 2021-01-22 ENCOUNTER — TREATMENT (OUTPATIENT)
Dept: PHYSICAL THERAPY | Facility: CLINIC | Age: 46
End: 2021-01-22

## 2021-01-22 DIAGNOSIS — M17.12 PATELLOFEMORAL ARTHRITIS OF LEFT KNEE: Primary | ICD-10-CM

## 2021-01-22 DIAGNOSIS — M94.262 CHONDROMALACIA OF KNEE, LEFT: ICD-10-CM

## 2021-01-22 DIAGNOSIS — M62.81 QUADRICEPS WEAKNESS: ICD-10-CM

## 2021-01-22 PROCEDURE — 97530 THERAPEUTIC ACTIVITIES: CPT | Performed by: PHYSICAL THERAPIST

## 2021-01-22 PROCEDURE — 97112 NEUROMUSCULAR REEDUCATION: CPT | Performed by: PHYSICAL THERAPIST

## 2021-01-22 PROCEDURE — 97164 PT RE-EVAL EST PLAN CARE: CPT | Performed by: PHYSICAL THERAPIST

## 2021-01-22 PROCEDURE — 97110 THERAPEUTIC EXERCISES: CPT | Performed by: PHYSICAL THERAPIST

## 2021-01-22 PROCEDURE — 97035 APP MDLTY 1+ULTRASOUND EA 15: CPT | Performed by: PHYSICAL THERAPIST

## 2021-01-22 NOTE — PROGRESS NOTES
"Re-Assessment / Re-Certification    Patient: Eleni Vaughan   : 1975  Diagnosis/ICD-10 Code:  Patellofemoral arthritis of left knee [M17.12]  Referring practitioner: Matthew Cooper MD  Date of Initial Visit: Type: THERAPY  Noted: 2020  Today's Date: 2021  Patient seen for 5 sessions      Subjective:   Eleni Vaughan reports: Pt states she's performing her HEP 2-3x per week.  Tolerating normal workouts with less pain but still stiffness/crepitus. No pain at rest. Hasn't followed up with her physician since last visit, but plans on following up in regards to future possible gel injections.  Subjective Questionnaire: LEFS: 77/80  Clinical Progress: improved  Home Program Compliance: Yes  Treatment has included: therapeutic exercise, neuromuscular re-education, therapeutic activity and ultrasound    Subjective   Objective          Palpation   Left   Tenderness of the distal semitendinosus.     Tenderness   Left Knee   Tenderness in the pes anserinus.     Additional Tenderness Details  Moderate pes anserinus TTP on left    Active Range of Motion   Left Knee   Flexion: 144 degrees   Extension: 0 degrees     Strength/Myotome Testing     Left Hip   Planes of Motion   Flexion: 5  Extension: 4+  Abduction: 5  Adduction: 4+    Isolated Muscles   Gluteus shaun: 4    Right Hip   Planes of Motion   Flexion: 5  Extension: 4  Abduction: 5  Adduction: 4+    Isolated Muscles   Gluteus maximums: 4    Left Knee   Flexion: 5  Extension: 5    Right Knee   Flexion: 5  Extension: 5      Assessment & Plan     Assessment  Assessment details: Pt is progressing well in therapy and has currently met 4/5 initial goals. Pt currently reporting no pain at rest, but only after performing her normal recreational workouts and also reporting increased \"stiffness\" in left knee. Pt's exercise program/HEP progressed to include squatting and step up exercise as well as increasing load. Pt demonstrated increased knee valgus " during step ups/squats but was able to correct with verbal/tactile cues. Pt has an appointment on 2/4/21 and instructed to monitor her symptoms post performing her new HEP. Plan is to discharge Pt on that date if she is tolerating exercises well and resuming her normal workouts with decreased subjective reports of stiffness.      Progress toward previous goals: Partially Met     Plan Goals: STGs: 4-6 weeks  1. Decrease left  knee pain 3/10 with steps and exercising. MET   2.  Decrease left knee medial and lateral patella tenderness to minimal Progressing    3.  Pt Independent with HEP. MET  4.  Increase left VMO and Quad strength to 5/5  MET  5.  Increase left hip flex, add, and ext strength to 4+-5/5. MET      New Goals  Short-term goals (STG) 0-4 weeks    1. Pt will not report increases in pain in left knee >/=1-2 out of 10 with squatting or stepping exercises.  2. Pt will report minimal stiffness in left knee post her normal workouts.  3. Pt will demonstrate 4+/5 gluteus shaun strength bilaterally.    Recommendations: Continue as planned  Timeframe: 1 month  Prognosis to achieve goals: good    PT Signature: Cheri Granado, PT; Rob Morocho Student PT      Based upon review of the patient's progress and continued therapy plan, it is my medical opinion that Eleni Vaughan should continue physical therapy treatment at [facility]  [address] [place] [id number] [phone].    Signature: __________________________________  Matthew Cooper MD    Manual Therapy:         mins  09343;  Therapeutic Exercise:    20     mins  21604;     Neuromuscular Shlomo:   24    mins  27934;    Therapeutic Activity:     10     mins  44996;     Gait Training:           mins  38711;     Ultrasound:     8     mins  83873;    Electrical Stimulation:         mins  46057 ( );  Dry Needling          mins self-pay  Re-eval                       10     mins       Timed  Treatment:   62   mins   Total Treatment:     73   mins

## 2021-02-04 ENCOUNTER — TREATMENT (OUTPATIENT)
Dept: PHYSICAL THERAPY | Facility: CLINIC | Age: 46
End: 2021-02-04

## 2021-02-04 ENCOUNTER — TELEPHONE (OUTPATIENT)
Dept: ORTHOPEDIC SURGERY | Facility: CLINIC | Age: 46
End: 2021-02-04

## 2021-02-04 DIAGNOSIS — M94.262 CHONDROMALACIA OF KNEE, LEFT: ICD-10-CM

## 2021-02-04 DIAGNOSIS — M62.81 QUADRICEPS WEAKNESS: ICD-10-CM

## 2021-02-04 DIAGNOSIS — M17.12 PATELLOFEMORAL ARTHRITIS OF LEFT KNEE: Primary | ICD-10-CM

## 2021-02-04 PROCEDURE — 97530 THERAPEUTIC ACTIVITIES: CPT | Performed by: PHYSICAL THERAPIST

## 2021-02-04 PROCEDURE — 97110 THERAPEUTIC EXERCISES: CPT | Performed by: PHYSICAL THERAPIST

## 2021-02-04 PROCEDURE — 97112 NEUROMUSCULAR REEDUCATION: CPT | Performed by: PHYSICAL THERAPIST

## 2021-02-04 NOTE — PROGRESS NOTES
Discharge Summary  Discharge Summary from Physical Therapy Report      Dates  PT visit: 11/20/20-2/4/21  Number of Visits: 6      Discharge Status of Patient: See MD Note dated See progress note dated 2/4/21 for objective measures.     Goals: Partially Met: 7/8     Discharge Plan: Continue with current home exercise program as instructed     Date of Discharge 2/4/21     Rob Morocho Student PT     Cheri Granado, PT  Physical Therapist

## 2021-02-04 NOTE — PROGRESS NOTES
Physical Therapy Daily Progress Note      Patient: Eleni Vaughan   : 1975  Diagnosis/ICD-10 Code:  Patellofemoral arthritis of left knee [M17.12]  Referring practitioner: Matthew Cooper MD  Date of Initial Visit: Type: THERAPY  Noted: 2020  Today's Date: 2021  Patient seen for 6 sessions         Eleni Vaughan reports: Pt reports that she is having no pain at rest or with her normal workouts, but the inside of her knee is stiff the day after she works out.         Subjective     Objective          Tenderness   Left Knee   Tenderness in the pes anserinus (Min).       See Exercise, Manual, and Modality Logs for complete treatment.       Assessment & Plan     Assessment  Assessment details: Pt tolerated treatment very well on this date. Pt has met 7/8 goals. Pt reporting no subjective increases in pain during or after her normal workouts or after squatting/stepping exercises and only minimal stiffness occurring the next day. Pt still reporting grinding with squatting/step up motions if knee flexion is increased. Pt educated on importance of limiting grinding of patellofemoral joint during squatting/step up motions to maintain health of retro-patellar knee articular cartilage and prevent potential degeneration from increased friction/compression. Pt is independent with her HEP. Pt's exercise program/HEP progressed to include banded glute max/glut med/min strengthening/endurance exercises to improve neuromuscular control of her left knee in the frontal plane to decrease loading through the pes anserine tendon insertion where she is still reporting min TTP. Pt will be discharged on this date to perform HEP independently at home.        Plan Goals: STGs: 4-6 weeks  1. Decrease left  knee pain 3/10 with steps and exercising. MET   2.  Decrease left knee medial and lateral patella tenderness to minimal MET  3.  Pt Independent with HEP. MET  4.  Increase left VMO and Quad strength to 5/5   MET  5.  Increase left hip flex, add, and ext strength to 4+-5/5. MET       New Goals  Short-term goals (STG) 0-4 weeks     1. Pt will not report increases in pain in left knee >/=1-2 out of 10 with squatting or stepping exercises. MET  2. Pt will report minimal stiffness in left knee post her normal workouts. MET  3. Pt will demonstrate 4+/5 gluteus shaun strength bilaterally. Not assessed           Manual Therapy:         mins  10413;  Therapeutic Exercise:     8    mins  81133;     Neuromuscular Shlomo:   14     mins  76640;    Therapeutic Activity:     24     mins  78370;     Gait Training:           mins  94657;     Ultrasound:         mins  50802;    Electrical Stimulation:         mins  72871 ( );  Dry Needling          mins self-pay    Timed Treatment:  46    mins   Total Treatment:     46   mins    Rob Morocho Student PT    Cheri Granado, PT  Physical Therapist

## 2021-02-04 NOTE — TELEPHONE ENCOUNTER
Caller:MICHAEL GERONIMO     Relationship to patient:SELF     Best call back number: [phone]    Chief complaint: PATIENT REQUESTING A TELEPHONE FOLLOW VISIT AFTER PHYSICAL THERAPY    Type of visit: TELEHEALTH    Requested date: ???

## 2021-02-12 ENCOUNTER — OFFICE VISIT (OUTPATIENT)
Dept: ORTHOPEDIC SURGERY | Facility: CLINIC | Age: 46
End: 2021-02-12

## 2021-02-12 VITALS — WEIGHT: 177 LBS | BODY MASS INDEX: 23.98 KG/M2 | HEIGHT: 72 IN

## 2021-02-12 DIAGNOSIS — Z98.890 S/P ARTHROSCOPIC PARTIAL LATERAL MENISCECTOMY: ICD-10-CM

## 2021-02-12 DIAGNOSIS — M17.12 PATELLOFEMORAL ARTHRITIS OF LEFT KNEE: ICD-10-CM

## 2021-02-12 DIAGNOSIS — M62.81 QUADRICEPS WEAKNESS: ICD-10-CM

## 2021-02-12 DIAGNOSIS — M70.50 PES ANSERINE BURSITIS: ICD-10-CM

## 2021-02-12 DIAGNOSIS — Z98.890 S/P ACL RECONSTRUCTION: Primary | ICD-10-CM

## 2021-02-12 PROBLEM — Z86.16 HISTORY OF COVID-19: Status: ACTIVE | Noted: 2020-12-24

## 2021-02-12 PROCEDURE — 99213 OFFICE O/P EST LOW 20 MIN: CPT | Performed by: ORTHOPAEDIC SURGERY

## 2021-02-12 PROCEDURE — 73562 X-RAY EXAM OF KNEE 3: CPT | Performed by: ORTHOPAEDIC SURGERY

## 2021-02-12 RX ORDER — MULTIVITAMIN/IRON/FOLIC ACID 18MG-0.4MG
TABLET ORAL
COMMUNITY
Start: 2020-05-01 | End: 2021-10-26

## 2021-02-12 RX ORDER — MULTIPLE VITAMINS W/ MINERALS TAB 9MG-400MCG
1 TAB ORAL DAILY
COMMUNITY
Start: 2020-08-01

## 2021-02-12 RX ORDER — CYANOCOBALAMIN (VITAMIN B-12) 500 MCG
LOZENGE ORAL
COMMUNITY
Start: 2020-08-01 | End: 2021-06-08

## 2021-02-12 NOTE — PROGRESS NOTES
"Subjective:     Patient ID: Eleni Vaughan is a 45 y.o. female.    Chief Complaint: Status post left knee ACL reconstruction with hamstring autograft and allograft supplement, partial lateral meniscectomy, DOS 2018    History of Present Illness  Eleni Vaughan returns to clinic today for evaluation of status post left knee ACL reconstruction.  She is recovering well but still has some difficulty with full squats and lunges.  She notes that her patella with \"crunch\" while doing activities, after which her knee will swell.  The swelling will last for several days but is reduced with ice and rest.  She does note moderate pain with deep flexion activities in particular localized to the anterior knee, rates as a 6-7 out of 10.     Social History     Occupational History   • Not on file   Tobacco Use   • Smoking status: Former Smoker     Years: 5.00     Types: Cigarettes     Quit date:      Years since quittin.1   • Smokeless tobacco: Never Used   • Tobacco comment: OCCASIONAL   Substance and Sexual Activity   • Alcohol use: Yes     Alcohol/week: 5.0 standard drinks     Types: 5 Glasses of wine per week     Comment: daily   • Drug use: Yes     Types: Marijuana, Cocaine(coke), LSD     Comment: ACID, H/O OCCASIONAL USE  10 YRS AGO   • Sexual activity: Defer     Partners: Male     Birth control/protection: Partner's vasectomy     Comment: spouse = NA      Past Medical History:   Diagnosis Date   • Allergic    • Breast pain in female    • Depression    • History of chicken pox    • History of heart murmur in childhood    • History of uterine fibroid    • Left knee pain     SCHED SURGERY   • Threatened miscarriage    • Vaginal delivery      Past Surgical History:   Procedure Laterality Date   • DILATATION AND CURETTAGE     • HERNIA REPAIR Right     INGUINAL   • KNEE ACL RECONSTRUCTION Left 2018    Procedure: REVISION KNEE ANTERIOR CRUCIATE LIGAMENT RECONSTRUCTION WITH HAMSTRING AUTOGRAFT, " possible bone patella tendon bone allograft, hardware removal. meniscal and cartilage surgery as indicated;  Surgeon: Matthew Cooper MD;  Location: [facility] OR;  Service: Orthopedics   • KNEE SURGERY Left     ACL recon BTB       Family History   Problem Relation Age of Onset   • Hyperlipidemia Mother    • Lung cancer Mother         dx     • Alcohol abuse Father    • Other Father          at 40 car accident    • No Known Problems Sister    • No Known Problems Brother    • No Known Problems Daughter    • No Known Problems Son    • Leukemia Maternal Grandmother    • No Known Problems Paternal Grandmother    • No Known Problems Maternal Aunt    • No Known Problems Paternal Aunt    • BRCA 1/2 Neg Hx    • Breast cancer Neg Hx    • Colon cancer Neg Hx    • Endometrial cancer Neg Hx    • Ovarian cancer Neg Hx    • Malig Hyperthermia Neg Hx          Review of Systems   Constitutional: Negative for chills, diaphoresis, fever and unexpected weight change.   HENT: Negative for hearing loss, nosebleeds, sore throat and tinnitus.    Eyes: Negative for pain and visual disturbance.   Respiratory: Negative for cough, shortness of breath and wheezing.    Cardiovascular: Negative for chest pain and palpitations.   Gastrointestinal: Negative for abdominal pain, diarrhea, nausea and vomiting.   Endocrine: Negative for cold intolerance, heat intolerance and polydipsia.   Genitourinary: Negative for difficulty urinating, dysuria and hematuria.   Musculoskeletal: Positive for arthralgias, joint swelling and myalgias.   Skin: Negative for rash and wound.   Allergic/Immunologic: Negative for environmental allergies.   Neurological: Negative for dizziness, syncope and numbness.   Hematological: Does not bruise/bleed easily.   Psychiatric/Behavioral: Negative for dysphoric mood and sleep disturbance. The patient is not nervous/anxious.            Objective:  Vitals:    21 0832   Weight: 80.3 kg (177 lb)   Height: 182.9 cm  "(72\")         02/12/21  0832   Weight: 80.3 kg (177 lb)     Body mass index is 24.01 kg/m².  General: No acute distress.  Resp: normal respiratory effort  Skin: no rashes or wounds; normal turgor  Psych: mood and affect appropriate; recent and remote memory intact          Ortho Exam     Left knee- TTP along pes bursa medially   ROM 0- 135   Grade 1A Lachman   Anterior drawer - negative   Posterior drawer - negative   minimal effusion   Stable to varus and valgus stress at 0 and 30 deg   Positive sensation left foot   Moderate patella femoral crepitus          Imaging:  Left Knee X-Ray  Indication: Status post anterior cruciate ligament reconstruction    AP, Lateral, and notch views    Findings:  Tibial and femoral tunnels in stable position and with no significant tunnel lysis  Implants evident with no evidence of loosening or disruption  Acceptable overall alignment  Mild lateral compartment joint space narrowing with mild reactive osteophyte formation along the lateral femoral condyle, otherwise joint space appears to be fairly symmetric and moderately well-preserved, lateral position of patella noted on AP view, no patella axial view available.  No evidence of intra-articular loose body    Compared to prior office x-rays      Assessment:        1. S/P ACL reconstruction    2. Quadriceps weakness    3. S/P arthroscopic partial lateral meniscectomy    4. Patellofemoral arthritis of left knee    5. Pes anserine bursitis           Plan:      1. Discussed treatment options at length with patient at today's visit including at-home exercises, topical cream, and gel injection.  Given failure of improvement with home exercises, activity modification, bracing, anti-inflammatory medications, patient does wish to proceed with Visco supplement injections.  Request sent to insurance for approval.  2. Given sample of Pennsaid cream for pes bursitis.  3. Recommended use of OTC knee compression sleeve to reduce " swelling.  4. Advised patient to continue at-home exercise program for improvement in strength, range of motion and function with daily activities.    Eleni Vaughan was in agreement with plan and had all questions answered.     Orders:  Orders Placed This Encounter   Procedures   • XR Knee 3 View Left   • Visco Treatment       Medications:  No orders of the defined types were placed in this encounter.      Followup:  Return for viscosupplement injections.    Diagnoses and all orders for this visit:    1. S/P ACL reconstruction (Primary)  -     XR Knee 3 View Left    2. Quadriceps weakness    3. S/P arthroscopic partial lateral meniscectomy    4. Patellofemoral arthritis of left knee  -     Visco Treatment; Future    5. Pes anserine bursitis        SCRIBE ATTESTATION:  I, Rogelio Leon, attest that all medical record entries for this patient were documented by me acting as a medical scribe for Matthew Cooper MD.    PROVIDER ATTESTATION:  IMatthew MD, personally performed the services described in this documentation. All medical record entries made by the scribe were at my direction and in my presence. I have reviewed the chart and discharge instructions and agree that the record reflects my personal performance and is accurate and complete.  Matthew Cooper MD.    Electronically signed: Matthew Cooper MD 2/13/2021 10:03 EST       Dictated utilizing Dragon dictation

## 2021-03-12 ENCOUNTER — TELEPHONE (OUTPATIENT)
Dept: OBSTETRICS AND GYNECOLOGY | Age: 46
End: 2021-03-12

## 2021-03-12 DIAGNOSIS — N81.4 CYSTOCELE WITH UTERINE PROLAPSE: Primary | ICD-10-CM

## 2021-03-12 NOTE — TELEPHONE ENCOUNTER
Pt called in and wanted a second opinion with Dr. Walsh on the prolapse she is having, she is still having discomfort.  Pt states she was working out with a medicine ball and then went to get on the elliptical and had a strange sensation.

## 2021-03-12 NOTE — TELEPHONE ENCOUNTER
nats are not working - she states effecting daily life.  Not able to exercise to the level she would like.  She feels prolapse is worse.

## 2021-03-12 NOTE — TELEPHONE ENCOUNTER
Spoke with pt  Significant issues with prolapse  Will refer to urogyn for evaluation and treatment

## 2021-03-25 ENCOUNTER — CLINICAL SUPPORT (OUTPATIENT)
Dept: ORTHOPEDIC SURGERY | Facility: CLINIC | Age: 46
End: 2021-03-25

## 2021-03-25 VITALS — HEIGHT: 72 IN | BODY MASS INDEX: 23.98 KG/M2 | WEIGHT: 177 LBS

## 2021-03-25 DIAGNOSIS — M17.12 PATELLOFEMORAL ARTHRITIS OF LEFT KNEE: Primary | ICD-10-CM

## 2021-03-25 PROCEDURE — 20610 DRAIN/INJ JOINT/BURSA W/O US: CPT | Performed by: ORTHOPAEDIC SURGERY

## 2021-03-25 NOTE — PROGRESS NOTES
Procedure   Large Joint Arthrocentesis: L knee  Date/Time: 3/25/2021 12:29 PM  Consent given by: patient  Site marked: site marked  Timeout: Immediately prior to procedure a time out was called to verify the correct patient, procedure, equipment, support staff and site/side marked as required   Supporting Documentation  Indications: pain   Procedure Details  Location: knee - L knee  Preparation: Patient was prepped and draped in the usual sterile fashion  Needle size: 20 G  Approach: superior (lateral)  Medications administered: 60 mg Sodium Hyaluronate 60 MG/3ML  Patient tolerance: patient tolerated the procedure well with no immediate complications      DUROLANE INJECTION PROVIDED VIA [employer].         Patient presents to clinic today for left knee viscosupplement one shot injection. I explained details of injections as well as risks, benefits and alternatives with the patient today, had all questions answered, wished to proceed with injection.  I will see patient back as needed for follow up on injection. Patient was instructed to watch for signs or symptoms of infection including redness, swelling, warmth to the touch, or significant increased pain and to contact our office immediately if any of these issues were noted.

## 2021-04-30 DIAGNOSIS — M70.50 PES ANSERINE BURSITIS: ICD-10-CM

## 2021-04-30 DIAGNOSIS — M17.12 PATELLOFEMORAL ARTHRITIS OF LEFT KNEE: Primary | ICD-10-CM

## 2021-05-04 ENCOUNTER — CLINICAL SUPPORT (OUTPATIENT)
Dept: ORTHOPEDIC SURGERY | Facility: CLINIC | Age: 46
End: 2021-05-04

## 2021-05-04 DIAGNOSIS — M17.12 PATELLOFEMORAL ARTHRITIS OF LEFT KNEE: Primary | ICD-10-CM

## 2021-05-04 PROCEDURE — 20610 DRAIN/INJ JOINT/BURSA W/O US: CPT | Performed by: ORTHOPAEDIC SURGERY

## 2021-05-04 RX ORDER — TRIAMCINOLONE ACETONIDE 40 MG/ML
80 INJECTION, SUSPENSION INTRA-ARTICULAR; INTRAMUSCULAR
Status: COMPLETED | OUTPATIENT
Start: 2021-05-04 | End: 2021-05-04

## 2021-05-04 RX ORDER — LIDOCAINE HYDROCHLORIDE 10 MG/ML
8 INJECTION, SOLUTION EPIDURAL; INFILTRATION; INTRACAUDAL; PERINEURAL
Status: COMPLETED | OUTPATIENT
Start: 2021-05-04 | End: 2021-05-04

## 2021-05-04 RX ADMIN — LIDOCAINE HYDROCHLORIDE 8 ML: 10 INJECTION, SOLUTION EPIDURAL; INFILTRATION; INTRACAUDAL; PERINEURAL at 09:51

## 2021-05-04 RX ADMIN — TRIAMCINOLONE ACETONIDE 80 MG: 40 INJECTION, SUSPENSION INTRA-ARTICULAR; INTRAMUSCULAR at 09:51

## 2021-05-04 NOTE — PROGRESS NOTES
Procedure   Large Joint Arthrocentesis: L knee  Date/Time: 5/4/2021 9:51 AM  Consent given by: patient  Site marked: site marked  Timeout: Immediately prior to procedure a time out was called to verify the correct patient, procedure, equipment, support staff and site/side marked as required   Supporting Documentation  Indications: pain   Procedure Details  Location: knee - L knee  Preparation: Patient was prepped and draped in the usual sterile fashion  Needle size: 22 G  Approach: superior  Medications administered: 8 mL lidocaine PF 1% 1 %; 80 mg triamcinolone acetonide 40 MG/ML  Patient tolerance: patient tolerated the procedure well with no immediate complications            Cc: Left knee DJD    Patient presents to clinic today for left knee intra-articular cortisone injection(s). I explained details of injections as well as risks, benefits and alternatives with the patient today, had all questions answered, wished to proceed with injections. Patient was instructed to watch for signs or symptoms of infection including redness, swelling, warmth to the touch, or significant increased pain and to contact our office immediately if any of these issues were noted.

## 2021-06-08 ENCOUNTER — OFFICE VISIT (OUTPATIENT)
Dept: OBSTETRICS AND GYNECOLOGY | Age: 46
End: 2021-06-08

## 2021-06-08 VITALS
BODY MASS INDEX: 27.09 KG/M2 | SYSTOLIC BLOOD PRESSURE: 120 MMHG | DIASTOLIC BLOOD PRESSURE: 70 MMHG | WEIGHT: 200 LBS | HEIGHT: 72 IN

## 2021-06-08 DIAGNOSIS — N92.1 MENORRHAGIA WITH IRREGULAR CYCLE: Primary | ICD-10-CM

## 2021-06-08 DIAGNOSIS — N81.4 CYSTOCELE WITH UTERINE PROLAPSE: ICD-10-CM

## 2021-06-08 DIAGNOSIS — R93.5 ABNORMAL ULTRASOUND OF ENDOMETRIUM: ICD-10-CM

## 2021-06-08 PROCEDURE — 99214 OFFICE O/P EST MOD 30 MIN: CPT | Performed by: OBSTETRICS & GYNECOLOGY

## 2021-06-08 PROCEDURE — 58100 BIOPSY OF UTERUS LINING: CPT | Performed by: OBSTETRICS & GYNECOLOGY

## 2021-06-08 RX ORDER — MULTIVIT WITH MINERALS/LUTEIN
1000 TABLET ORAL DAILY
COMMUNITY
Start: 2020-12-30

## 2021-06-08 RX ORDER — AMOXICILLIN 500 MG
1200 CAPSULE ORAL DAILY
COMMUNITY
Start: 2021-05-29

## 2021-06-08 NOTE — PROGRESS NOTES
GYN Visit    2021    Patient: Eleni Vaughan          MR#:[id number]      Chief Complaint   Patient presents with   • Follow-up     US irregular bleeding        History of Present Illness    45 y.o. female  who presents for  New problem of irregular cycle  Started early and was prolonged and heavy  Mostly regular cycles previously, no HF or NS  Saw Dr Christensen and deciding on surgical plan for prolapse, reviewed with pt giving advice on this  US done today due to irregular cycle- reviewed with pt on Epic screen  Overall reassuring ,however lining is somewhat thick at 16 mm  Menses due next week  Ovaries active with some follicles and a little free fluid around cul de sac  Recommend recheck lining after menses or do biopsy to rule out hyperplasia, since likely surgery in near future with Dr Christensen will proceed with biopsy  RBA discussed, VAS,         Patient's last menstrual period was 2021.    ________________________________________  Patient Active Problem List   Diagnosis   • S/P arthroscopic partial lateral meniscectomy   • S/P ACL reconstruction   • Chronic sprain or strain of lumbar region   • Corneal scarring   • Rectal bleeding   • Umbilical hernia without obstruction and without gangrene   • Chondromalacia of knee, left   • Patellofemoral arthritis of left knee   • History of COVID-19   • Pes anserine bursitis       Past Medical History:   Diagnosis Date   • Allergic    • Breast pain in female    • Depression    • History of chicken pox    • History of heart murmur in childhood    • History of uterine fibroid    • Left knee pain     SCHED SURGERY   • Threatened miscarriage    • Vaginal delivery        Past Surgical History:   Procedure Laterality Date   • DILATATION AND CURETTAGE     • HERNIA REPAIR Right     INGUINAL   • KNEE ACL RECONSTRUCTION Left 2018    Procedure: REVISION KNEE ANTERIOR CRUCIATE LIGAMENT RECONSTRUCTION WITH HAMSTRING AUTOGRAFT, possible bone patella tendon bone  "allograft, hardware removal. meniscal and cartilage surgery as indicated;  Surgeon: Matthew Cooper MD;  Location: [facility];  Service: Orthopedics   • KNEE SURGERY Left     ACL recon BTB       Social History     Tobacco Use   Smoking Status Former Smoker   • Years: 5.00   • Types: Cigarettes   • Quit date:    • Years since quittin.4   Smokeless Tobacco Never Used   Tobacco Comment    OCCASIONAL       has a current medication list which includes the following prescription(s): ascorbic acid, fish oil, glucosamine-chondroitin, multivitamin with minerals, and turmeric.  ________________________________________    Current contraception: vasectomy      The following portions of the patient's history were reviewed and updated as appropriate: allergies, current medications, past family history, past medical history, past social history, past surgical history and problem list.    Review of Systems   Constitutional: Negative for chills, fatigue and fever.   Gastrointestinal: Negative for nausea and vomiting.   Genitourinary: Positive for menstrual problem. Negative for pelvic pain.   Psychiatric/Behavioral: Negative for dysphoric mood.   All other systems reviewed and are negative.      Pertinent items are noted in HPI.     Objective   Physical Exam    /70   Ht 182.9 cm (72\")   Wt 90.7 kg (200 lb)   LMP 2021   Breastfeeding No   BMI 27.12 kg/m²    BP Readings from Last 3 Encounters:   21 120/70   09/10/20 112/78   19 104/70      Wt Readings from Last 3 Encounters:   21 90.7 kg (200 lb)   21 80.3 kg (177 lb)   21 80.3 kg (177 lb)      BMI: Estimated body mass index is 27.12 kg/m² as calculated from the following:    Height as of this encounter: 182.9 cm (72\").    Weight as of this encounter: 90.7 kg (200 lb).    Lungs: non labored breathing, no wheezing or tachpnea  Extremities: extremities normal, atraumatic, no cyanosis or edema  Skin: Skin color, texture, " turgor normal. No rashes or lesions  Neurologic: Grossly normal  General:   alert, appears stated age and cooperative   Abdomen: soft, non-tender, without masses or organomegaly       Vulva: normal,    Vagina: normal mucosa, significant prolapse of uterus and cystocele   Cervix: no cervical motion tenderness   Uterus: normal size, mobile and non-tender   Adnexa: no mass, fullness, tenderness     Endometrial Biopsy Procedure Note    Pre-operative Diagnosis: menorrhagia., abnormal endometrium on US    Post-operative Diagnosis: same    Indications: abnormal uterine bleeding    Procedure Details    Urine pregnancy test was not done.  The risks (including infection, bleeding, pain, and uterine perforation) and benefits of the procedure were explained to the patient and Verbal informed consent was obtained.  Antibiotic prophylaxis against endocarditis was not indicated.     The patient was placed in the dorsal lithotomy position.  Bimanual exam showed the uterus to be in the neutral position.  A Graves' speculum inserted in the vagina, and the cervix prepped with povidone iodine.  Endocervical curettage with a Kevorkian curette was not performed.     A sharp tenaculum was applied to the anterior lip of the cervix for stabilization.  A sterile uterine sound was used to sound the uterus to a depth of 8cm.  A Pipelle endometrial aspirator was used to sample the endometrium.  Sample was sent for pathologic examination.    Condition:  Stable    Complications:  None  Patient tolerated the procedure well without complications.    Plan:    The patient was advised to call for any fever or for prolonged or severe pain or bleeding. She was advised to use OTC ibuprofen as needed for mild to moderate pain. She was advised to avoid vaginal intercourse for 48 hours or until the bleeding has completely stopped.    Attending Physician Documentation:  I was present for the entire procedure.   Assessment:      Diagnoses and all orders for  this visit:    1. Menorrhagia with irregular cycle (Primary)  -     Endometrial Biopsy  -     Reference Histopathology    2. Cystocele with uterine prolapse    3. Abnormal ultrasound of endometrium      If biopsy negative no follow up needed as pt plans hysterectomy with Dr Christensen in near future    I spent over 40 minutes with care of this patient

## 2021-06-10 ENCOUNTER — TELEPHONE (OUTPATIENT)
Dept: OBSTETRICS AND GYNECOLOGY | Age: 46
End: 2021-06-10

## 2021-06-10 LAB
DX ICD CODE: NORMAL
PATH REPORT.FINAL DX SPEC: NORMAL
PATH REPORT.GROSS SPEC: NORMAL
PATH REPORT.RELEVANT HX SPEC: NORMAL
PATH REPORT.SITE OF ORIGIN SPEC: NORMAL
PATHOLOGIST NAME: NORMAL
PAYMENT PROCEDURE: NORMAL

## 2021-06-10 NOTE — TELEPHONE ENCOUNTER
----- Message from Jaqui Polanco MD sent at 6/10/2021  1:23 PM EDT -----  Notify negative endometrial biopsy- good

## 2021-09-19 ENCOUNTER — PREP FOR SURGERY (OUTPATIENT)
Dept: OTHER | Facility: HOSPITAL | Age: 46
End: 2021-09-19

## 2021-09-19 DIAGNOSIS — N81.2 UTEROVAGINAL PROLAPSE, INCOMPLETE: Primary | ICD-10-CM

## 2021-09-19 DIAGNOSIS — N81.6 RECTOCELE: ICD-10-CM

## 2021-09-19 DIAGNOSIS — N81.5 VAGINAL ENTEROCELE: ICD-10-CM

## 2021-09-19 DIAGNOSIS — N81.11 CYSTOCELE, MIDLINE: ICD-10-CM

## 2021-09-19 DIAGNOSIS — N39.3 FEMALE STRESS INCONTINENCE: ICD-10-CM

## 2021-09-19 DIAGNOSIS — N81.89 LOSS OF PERINEAL BODY, FEMALE: ICD-10-CM

## 2021-09-19 DIAGNOSIS — N81.12 CYSTOCELE, LATERAL: ICD-10-CM

## 2021-09-19 DIAGNOSIS — R39.14 FEELING OF INCOMPLETE BLADDER EMPTYING: ICD-10-CM

## 2021-09-19 DIAGNOSIS — K42.9 UMBILICAL HERNIA WITHOUT OBSTRUCTION AND WITHOUT GANGRENE: ICD-10-CM

## 2021-09-19 DIAGNOSIS — N36.42 URETHRAL SPHINCTER DEFICIENCY, INTRINSIC (ISD): ICD-10-CM

## 2021-09-19 RX ORDER — SODIUM CHLORIDE 0.9 % (FLUSH) 0.9 %
10 SYRINGE (ML) INJECTION AS NEEDED
Status: CANCELLED | OUTPATIENT
Start: 2021-10-15

## 2021-09-19 RX ORDER — PHENAZOPYRIDINE HYDROCHLORIDE 200 MG/1
200 TABLET, FILM COATED ORAL ONCE
Status: CANCELLED | OUTPATIENT
Start: 2021-10-15 | End: 2021-09-19

## 2021-09-19 RX ORDER — CEFAZOLIN SODIUM 2 G/100ML
2 INJECTION, SOLUTION INTRAVENOUS ONCE
Status: CANCELLED | OUTPATIENT
Start: 2021-10-15 | End: 2021-09-19

## 2021-09-19 RX ORDER — SODIUM CHLORIDE 0.9 % (FLUSH) 0.9 %
3 SYRINGE (ML) INJECTION EVERY 12 HOURS SCHEDULED
Status: CANCELLED | OUTPATIENT
Start: 2021-10-15

## 2021-10-25 ENCOUNTER — TELEPHONE (OUTPATIENT)
Dept: OBSTETRICS AND GYNECOLOGY | Age: 46
End: 2021-10-25

## 2021-10-25 PROBLEM — N81.5 VAGINAL ENTEROCELE: Status: ACTIVE | Noted: 2021-10-25

## 2021-10-25 PROBLEM — N36.42 URETHRAL SPHINCTER DEFICIENCY, INTRINSIC (ISD): Status: ACTIVE | Noted: 2021-10-25

## 2021-10-25 PROBLEM — N81.12 CYSTOCELE, LATERAL: Status: ACTIVE | Noted: 2021-10-25

## 2021-10-25 PROBLEM — N81.11 CYSTOCELE, MIDLINE: Status: ACTIVE | Noted: 2021-10-25

## 2021-10-25 PROBLEM — N39.3 FEMALE STRESS INCONTINENCE: Status: ACTIVE | Noted: 2021-10-25

## 2021-10-25 PROBLEM — R39.14 FEELING OF INCOMPLETE BLADDER EMPTYING: Status: ACTIVE | Noted: 2021-10-25

## 2021-10-25 PROBLEM — N81.89 LOSS OF PERINEAL BODY, FEMALE: Status: ACTIVE | Noted: 2021-10-25

## 2021-10-25 NOTE — TELEPHONE ENCOUNTER
----- Message from Rebekah Knowles MA sent at 10/25/2021 11:07 AM EDT -----  Regarding: FW: Dr. Christensen    ----- Message -----  From: Eleni Vaughan  Sent: 10/24/2021  10:25 PM EDT  To: [facility]ap[facility]  Subject: Dr. Christensen                                         Hi Dr. Polanco. So after having a second opinion,  with Dr. Aguirre, and then talking with Dr. Christensen, we finalized a procedure of a laproscopic hysterectomy, prolapse lift,and injection bulking instead of a sling. I've been having bladder Installations for a sensitive bladder and things seem to be going smoothly. Surgery was set of Nov. 2nd. But then last Thursday, her  called saying Dr. Christensen's surgical assistant is having a medical situation and Dr. Christensen is having to find another assistant and has to reschedule my surgery. I still have yet to heard from Dr. Christensen's office with a new date, and I have the hospital calling me to make arrangements for my presurgery information. Have you heard of anything like this before? I hate to say it but I'm having second thoughts again and wondered if you have heard of things like this or other deterring comments about Dr. Christensen I should be aware of? Thank you.

## 2021-10-25 NOTE — TELEPHONE ENCOUNTER
Spoke with pt  Advised, rescheduling of surgery due to change of assistant is totally reasonable  Discussed expectations of surgery and she should be pleased with prolapse correction and hysterectomy ( no periods and cyclic pain).  Recommend she optimize non surgical methods including PT which she has done.   Pt plans to proceed with surgery

## 2021-10-26 ENCOUNTER — PRE-ADMISSION TESTING (OUTPATIENT)
Dept: PREADMISSION TESTING | Facility: HOSPITAL | Age: 46
End: 2021-10-26

## 2021-10-26 VITALS
RESPIRATION RATE: 16 BRPM | HEIGHT: 72 IN | SYSTOLIC BLOOD PRESSURE: 110 MMHG | WEIGHT: 202 LBS | OXYGEN SATURATION: 100 % | TEMPERATURE: 97 F | DIASTOLIC BLOOD PRESSURE: 67 MMHG | BODY MASS INDEX: 27.36 KG/M2 | HEART RATE: 52 BPM

## 2021-10-26 LAB
ANION GAP SERPL CALCULATED.3IONS-SCNC: 13.9 MMOL/L (ref 5–15)
BUN SERPL-MCNC: 12 MG/DL (ref 6–20)
BUN/CREAT SERPL: 16.7 (ref 7–25)
CALCIUM SPEC-SCNC: 8.7 MG/DL (ref 8.6–10.5)
CHLORIDE SERPL-SCNC: 107 MMOL/L (ref 98–107)
CO2 SERPL-SCNC: 21.1 MMOL/L (ref 22–29)
CREAT SERPL-MCNC: 0.72 MG/DL (ref 0.57–1)
DEPRECATED RDW RBC AUTO: 44 FL (ref 37–54)
ERYTHROCYTE [DISTWIDTH] IN BLOOD BY AUTOMATED COUNT: 12.1 % (ref 12.3–15.4)
GFR SERPL CREATININE-BSD FRML MDRD: 87 ML/MIN/1.73
GLUCOSE SERPL-MCNC: 89 MG/DL (ref 65–99)
HCT VFR BLD AUTO: 42.5 % (ref 34–46.6)
HGB BLD-MCNC: 13.5 G/DL (ref 12–15.9)
MCH RBC QN AUTO: 30.8 PG (ref 26.6–33)
MCHC RBC AUTO-ENTMCNC: 31.8 G/DL (ref 31.5–35.7)
MCV RBC AUTO: 96.8 FL (ref 79–97)
PLATELET # BLD AUTO: 188 10*3/MM3 (ref 140–450)
PMV BLD AUTO: 9.2 FL (ref 6–12)
POTASSIUM SERPL-SCNC: 4.1 MMOL/L (ref 3.5–5.2)
RBC # BLD AUTO: 4.39 10*6/MM3 (ref 3.77–5.28)
SODIUM SERPL-SCNC: 142 MMOL/L (ref 136–145)
WBC # BLD AUTO: 4.62 10*3/MM3 (ref 3.4–10.8)

## 2021-10-26 PROCEDURE — 85027 COMPLETE CBC AUTOMATED: CPT

## 2021-10-26 PROCEDURE — 80048 BASIC METABOLIC PNL TOTAL CA: CPT

## 2021-10-26 PROCEDURE — 36415 COLL VENOUS BLD VENIPUNCTURE: CPT

## 2021-10-26 RX ORDER — CHOLECALCIFEROL (VITAMIN D3) 125 MCG
2000 TABLET ORAL DAILY
COMMUNITY

## 2021-10-26 RX ORDER — MULTIVITAMIN WITH IRON
250 TABLET ORAL DAILY
COMMUNITY

## 2021-10-26 RX ORDER — SODIUM PHOSPHATE,MONO-DIBASIC 19G-7G/118
2 ENEMA (ML) RECTAL DAILY
COMMUNITY

## 2021-10-26 NOTE — DISCHARGE INSTRUCTIONS
Take the following medications the morning of surgery: NONE    If you are on prescription narcotic pain medication to control your pain you may also take that medication the morning of surgery.    General Instructions:  • Do not eat solid food after midnight the night before surgery.  • You may drink clear liquids day of surgery but must stop at least one hour before your hospital arrival time.  • It is beneficial for you to have a clear drink that contains carbohydrates the day of surgery.  We suggest a 12 to 20 ounce bottle of Gatorade or Powerade for non-diabetic patients or a 12 to 20 ounce bottle of G2 or Powerade Zero for diabetic patients.     Clear liquids are liquids you can see through.  Nothing red in color.     Plain water                               Sports drinks  Sodas                                   Gelatin (Jell-O)  Fruit juices without pulp such as white grape juice and apple juice  Popsicles that contain no fruit or yogurt  Tea or coffee (no cream or milk added)  Gatorade / Powerade  G2 / Powerade Zero    • Do not smoke, use chewing tobacco or drink alcohol the day of surgery.   • Bring any papers given to you in the doctor’s office.  • Wear clean comfortable clothes.  • Do not wear contact lenses, false eyelashes or make-up.  Bring a case for your glasses.   • Remove all piercings.  Leave jewelry and any other valuables at home.  • The Pre-Admission Testing nurse will instruct you to bring medications if unable to obtain an accurate list in Pre-Admission Testing.            Preventing a Surgical Site Infection:  • For 2 to 3 days before surgery, avoid shaving with a razor because the razor can irritate skin and make it easier to develop an infection.    • Any areas of open skin can increase the risk of a post-operative wound infection by allowing bacteria to enter and travel throughout the body.  Notify your surgeon if you have any skin wounds / rashes even if it is not near the expected  surgical site.  The area will need assessed to determine if surgery should be delayed until it is healed.  • The night prior to surgery shower using a fresh bar of anti-bacterial soap (such as Dial) and clean washcloth.  Sleep in a clean bed with clean clothing.  Do not allow pets to sleep with you.  • Shower on the morning of surgery using a fresh bar of anti-bacterial soap (such as Dial) and clean washcloth.  Dry with a clean towel and dress in clean clothing.  • Ask your surgeon if you will be receiving antibiotics prior to surgery.  • Make sure you, your family, and all healthcare providers clean their hands with soap and water or an alcohol based hand  before caring for you or your wound.    Day of surgery:  Your arrival time is approximately two hours before your scheduled surgery time.  Upon arrival, a Pre-op nurse and Anesthesiologist will review your health history, obtain vital signs, and answer questions you may have.  The only belongings needed at this time will be a list of your home medications and if applicable your C-PAP/BI-PAP machine.  A Pre-op nurse will start an IV and you may receive medication in preparation for surgery, including something to help you relax.     Please be aware that surgery does come with discomfort.  We want to make every effort to control your discomfort so please discuss any uncontrolled symptoms with your nurse.   Your doctor will most likely have prescribed pain medications.      If you are going home after surgery you will receive individualized written care instructions before being discharged.  A responsible adult must drive you to and from the hospital on the day of your surgery and stay with you for 24 hours.  Discharge prescriptions can be filled by the hospital pharmacy during regular pharmacy hours.  If you are having surgery late in the day/evening your prescription may be e-prescribed to your pharmacy.  Please verify your pharmacy hours or chose a 24  hour pharmacy to avoid not having access to your prescription because your pharmacy has closed for the day.    If you are staying overnight following surgery, you will be transported to your hospital room following the recovery period.  [facility] has all private rooms.    If you have any questions please call Pre-Admission Testing at [phone].  Deductibles and co-payments are collected on the day of service. Please be prepared to pay the required co-pay, deductible or deposit on the day of service as defined by your plan.    CHLORHEXIDINE CLOTH INSTRUCTIONS  The morning of surgery follow these instructions using the Chlorhexidine cloths you've been given.  These steps reduce bacteria on the body.  Do not use the cloths near your eyes, ears mouth, genitalia or on open wounds.  Throw the cloths away after use but do not try to flush them down a toilet.      • Open and remove one cloth at a time from the package.    • Leave the cloth unfolded and begin the bathing.  • Massage the skin with the cloths using gentle pressure to remove bacteria.  Do not scrub harshly.   • Follow the steps below with one 2% CHG cloth per area (6 total cloths).  • One cloth for neck, shoulders and chest.  • One cloth for both arms, hands, fingers and underarms (do underarms last).  • One cloth for the abdomen followed by groin.  • One cloth for right leg and foot including between the toes.  • One cloth for left leg and foot including between the toes.  • The last cloth is to be used for the back of the neck, back and buttocks.    Allow the CHG to air dry 3 minutes on the skin which will give it time to work and decrease the chance of irritation.  The skin may feel sticky until it is dry.  Do not rinse with water or any other liquid or you will lose the beneficial effects of the CHG.  If mild skin irritation occurs, do rinse the skin to remove the CHG.  Report this to the nurse at time of admission.  Do not apply  lotions, creams, ointments, deodorants or perfumes after using the clothes. Dress in clean clothes before coming to the hospital.    Patient Education for Self-Quarantine Process    • Following your COVID testing, we strongly recommend that you wear a mask when you are with other people and practice social distancing.   • Limit your activities to only required outings.  • Wash your hands with soap and water frequently for at least 20 seconds.   • Avoid touching your eyes, nose and mouth with unwashed hands.  • Do not share anything - utensils, drinking glasses, food from the same bowl.   • Sanitize household surfaces daily. Include all high touch areas (door handles, light switches, phones, countertops, etc.)    Call your surgeon immediately if you experience any of the following symptoms:  • Sore Throat  • Shortness of Breath or difficulty breathing  • Cough  • Chills  • Body soreness or muscle pain  • Headache  • Fever  • New loss of taste or smell  • Do not arrive for your surgery ill.  Your procedure will need to be rescheduled to another time.  You will need to call your physician before the day of surgery to avoid any unnecessary exposure to hospital staff as well as other patients.

## 2021-11-17 ENCOUNTER — LAB (OUTPATIENT)
Dept: LAB | Facility: HOSPITAL | Age: 46
End: 2021-11-17

## 2021-11-17 LAB — SARS-COV-2 ORF1AB RESP QL NAA+PROBE: NOT DETECTED

## 2021-11-17 PROCEDURE — U0004 COV-19 TEST NON-CDC HGH THRU: HCPCS

## 2021-11-17 PROCEDURE — C9803 HOPD COVID-19 SPEC COLLECT: HCPCS

## 2021-11-18 NOTE — PLAN OF CARE
Laparoscopic uterosacral ligament colpopexy sacral colpopexy   Laparoscopic paravaginal repair   Laparoscopic hysterectomy with bilateral salpingectomy   Possible laparoscopic unilateral or bilateral oophorectomy   Laparoscopic abdominal enterocele repair   Pubovaginal sling   Posterior colporrhaphy   Anterior colporrhaphy   Extraperitoneal colpopexy sacrospinous ligament fixation   Insertion of vaginal grafts   umbilical hernia repair   Cystourethroscopy

## 2021-11-18 NOTE — H&P
Female Pelvic Medicine and Reconstructive Surgery   History & Physical    Patient Identification:  Name: Eleni Vaughan Age: 45 y.o. Sex: female :  1975 MRN: Female Pelvic Medicine and Reconstructive Surgery   History & Physical    Patient Identification:  Name: Eleni Vaughan Age: 45 y.o. Sex: female :  1975 MRN: [id number]                            Problem List:    Rectal bleeding    Umbilical hernia without obstruction and without gangrene    Uterovaginal prolapse, incomplete    Cystocele, midline    Cystocele, lateral    Vaginal enterocele    Loss of perineal body, female    Female stress incontinence    Urethral sphincter deficiency, intrinsic (ISD)    Feeling of incomplete bladder emptying    Past Medical History:  Past Medical History:   Diagnosis Date   • Arthritis     LEFT KNEE WITH LEFT KNEE PAIN   • Breast pain in female    • Corneal scarring     SOLEDAD EYES-INJURY AS CHILD   • H/O heartburn     ON OCC   • History of chicken pox    • History of depression    • History of heart murmur in childhood    • History of uterine fibroid    • Personal history of COVID-19     DEC 2020   • Seasonal allergies    • Stress bladder incontinence, female    • Threatened miscarriage    • Uterine prolapse     STATES SOME RECTAL   • Vaginal delivery      Past Surgical History:  Past Surgical History:   Procedure Laterality Date   • DILATATION AND CURETTAGE     • HERNIA REPAIR Right     INGUINAL   • KNEE ACL RECONSTRUCTION Left 2018    Procedure: REVISION KNEE ANTERIOR CRUCIATE LIGAMENT RECONSTRUCTION WITH HAMSTRING AUTOGRAFT, possible bone patella tendon bone allograft, hardware removal. meniscal and cartilage surgery as indicated;  Surgeon: Matthew Cooper MD;  Location: [facility];  Service: Orthopedics   • KNEE SURGERY Left     ACL recon BTB   • WISDOM TOOTH EXTRACTION        Home Meds:  No medications prior to admission.     Current Meds:   No current facility-administered  medications for this encounter.    Current Outpatient Medications:   •  ascorbic acid (VITAMIN C) 1000 MG tablet, Take 1,000 mg by mouth Daily., Disp: , Rfl:   •  CHLORHEXIDINE GLUCONATE CLOTH EX, Apply  topically. AS DIRECTED, Disp: , Rfl:   •  Ergocalciferol (Vitamin D2) 50 MCG (2000 UT) tablet, Take 2,000 Units by mouth Daily., Disp: , Rfl:   •  glucosamine-chondroitin 500-400 MG capsule capsule, Take 2 capsules by mouth 3 (Three) Times a Day With Meals., Disp: , Rfl:   •  Magnesium ([employer] Magnesium) 250 MG tablet, Take 250 mg by mouth Daily., Disp: , Rfl:   •  multivitamin with minerals (ONE DAILY WOMENS PO), Take 1 tablet by mouth Daily., Disp: , Rfl:   •  Omega-3 Fatty Acids (fish oil) 1200 MG capsule capsule, Take 1,200 mg by mouth Daily. HOLD FOR SURGERY, Disp: , Rfl:   •  TURMERIC PO, Take 2,000 mg by mouth Daily. HOLD FOR SURGERY, Disp: , Rfl:   •  Zinc 50 MG capsule, Take 50 mg by mouth Daily., Disp: , Rfl:   Allergies:  Allergies   Allergen Reactions   • Morphine And Related Nausea And Vomiting   • Sulfa Antibiotics Hives     Immunizations:    There is no immunization history on file for this patient.  Social History:   Social History     Tobacco Use   • Smoking status: Former Smoker     Years: 5.00     Types: Cigarettes     Quit date:      Years since quittin.8   • Smokeless tobacco: Never Used   • Tobacco comment: OCCASIONAL   Substance Use Topics   • Alcohol use: Yes     Alcohol/week: 5.0 standard drinks     Types: 5 Glasses of wine per week     Comment: SOCIALLY      Family History:  Family History   Problem Relation Age of Onset   • Hyperlipidemia Mother    • Lung cancer Mother         dx     • Alcohol abuse Father    • Other Father          at 40 car accident    • No Known Problems Sister    • No Known Problems Brother    • No Known Problems Daughter    • No Known Problems Son    • Leukemia Maternal Grandmother    • No Known Problems Paternal Grandmother    • No Known Problems  Maternal Aunt    • No Known Problems Paternal Aunt    • BRCA 1/2 Neg Hx    • Breast cancer Neg Hx    • Colon cancer Neg Hx    • Endometrial cancer Neg Hx    • Ovarian cancer Neg Hx    • Malig Hyperthermia Neg Hx         Review of Systems  Constitutional:  Denies fever or chills   Eyes:  Denies change in visual acuity   HEENT:  Denies nasal congestion or sore throat   Respiratory:  Denies cough or shortness of breath   Cardiovascular:  Denies chest pain or edema   GI:  Denies abdominal pain, nausea, vomiting, bloody stools or diarrhea   :  Denies dysuria   Musculoskeletal:  Denies back pain or joint pain   Integument:  Denies rash   Neurologic:  Denies headache, focal weakness or sensory changes   Endocrine:  Denies polyuria or polydipsia   Lymphatic:  Denies swollen glands   Psychiatric:  Denies depression or anxiety       Objective:  tMax 24 hrs: No data recorded.    Vitals Ranges:      Exam:  Vital Signs: There were no vitals taken for this visit.  Constitutional:  Well developed, well nourished, no acute distress, non-toxic appearance    GI:  Soft, nondistended, normal bowel sounds, nontender, no organomegaly, no mass, no rebound, no guarding   :  No costovertebral angle tenderness   Musculoskeletal:  No edema, no tenderness, no deformities. Back- no tenderness  Integument:  Well hydrated, no rash   Lymphatic:  No lymphadenopathy noted   Neurologic:  Alert & oriented x 3,  Pelvic:     POPQ  +1  +1   0    6   2    9  +1   +1   -4    LPP 60 cm H2O     Pap neg   9  10  20  EMB  6 8 2021  neg    Assessment:    Rectal bleeding    Umbilical hernia without obstruction and without gangrene    Uterovaginal prolapse, incomplete    Cystocele, midline    Cystocele, lateral    Vaginal enterocele    Loss of perineal body, female    Female stress incontinence    Urethral sphincter deficiency, intrinsic (ISD)    Feeling of incomplete bladder emptying      Plan:  Laparoscopic uterosacral ligament colpopexy sacral colpopexy    Laparoscopic paravaginal repair   Laparoscopic hysterectomy with bilateral salpingectomy   Possible laparoscopic unilateral or bilateral oophorectomy   Laparoscopic abdominal enterocele repair   Pubovaginal sling   Posterior colporrhaphy   Anterior colporrhaphy   Extraperitoneal colpopexy sacrospinous ligament fixation   Insertion of vaginal grafts   umbilical hernia repair   Cystourethroscopy    Yadira Christensen MD  11/18/2021

## 2021-11-19 ENCOUNTER — ANESTHESIA (OUTPATIENT)
Dept: PERIOP | Facility: HOSPITAL | Age: 46
End: 2021-11-19

## 2021-11-19 ENCOUNTER — ANESTHESIA EVENT (OUTPATIENT)
Dept: PERIOP | Facility: HOSPITAL | Age: 46
End: 2021-11-19

## 2021-11-19 ENCOUNTER — HOSPITAL ENCOUNTER (OUTPATIENT)
Facility: HOSPITAL | Age: 46
Discharge: HOME | End: 2021-11-20
Attending: OBSTETRICS & GYNECOLOGY | Admitting: OBSTETRICS & GYNECOLOGY

## 2021-11-19 DIAGNOSIS — N36.42 URETHRAL SPHINCTER DEFICIENCY, INTRINSIC (ISD): ICD-10-CM

## 2021-11-19 DIAGNOSIS — N81.12 CYSTOCELE, LATERAL: ICD-10-CM

## 2021-11-19 DIAGNOSIS — R39.14 FEELING OF INCOMPLETE BLADDER EMPTYING: ICD-10-CM

## 2021-11-19 DIAGNOSIS — N39.3 FEMALE STRESS INCONTINENCE: ICD-10-CM

## 2021-11-19 DIAGNOSIS — N81.2 UTEROVAGINAL PROLAPSE, INCOMPLETE: ICD-10-CM

## 2021-11-19 DIAGNOSIS — N81.5 VAGINAL ENTEROCELE: ICD-10-CM

## 2021-11-19 DIAGNOSIS — N81.11 CYSTOCELE, MIDLINE: ICD-10-CM

## 2021-11-19 DIAGNOSIS — N81.89 LOSS OF PERINEAL BODY, FEMALE: ICD-10-CM

## 2021-11-19 DIAGNOSIS — N81.6 RECTOCELE: ICD-10-CM

## 2021-11-19 DIAGNOSIS — K42.9 UMBILICAL HERNIA WITHOUT OBSTRUCTION AND WITHOUT GANGRENE: ICD-10-CM

## 2021-11-19 LAB
ABO GROUP BLD: NORMAL
B-HCG UR QL: NEGATIVE
BLD GP AB SCN SERPL QL: NEGATIVE
EXPIRATION DATE: NORMAL
INTERNAL NEGATIVE CONTROL: NEGATIVE
INTERNAL POSITIVE CONTROL: POSITIVE
Lab: NORMAL
RH BLD: POSITIVE
T&S EXPIRATION DATE: NORMAL

## 2021-11-19 PROCEDURE — G0378 HOSPITAL OBSERVATION PER HR: HCPCS

## 2021-11-19 PROCEDURE — 0 CEFAZOLIN IN DEXTROSE 2-4 GM/100ML-% SOLUTION: Performed by: NURSE ANESTHETIST, CERTIFIED REGISTERED

## 2021-11-19 PROCEDURE — 25010000002 FENTANYL CITRATE (PF) 50 MCG/ML SOLUTION: Performed by: NURSE ANESTHETIST, CERTIFIED REGISTERED

## 2021-11-19 PROCEDURE — 25010000002 MIDAZOLAM PER 1 MG: Performed by: ANESTHESIOLOGY

## 2021-11-19 PROCEDURE — 25010000002 TRIAMCINOLONE PER 10 MG: Performed by: OBSTETRICS & GYNECOLOGY

## 2021-11-19 PROCEDURE — 88305 TISSUE EXAM BY PATHOLOGIST: CPT | Performed by: OBSTETRICS & GYNECOLOGY

## 2021-11-19 PROCEDURE — 86901 BLOOD TYPING SEROLOGIC RH(D): CPT | Performed by: OBSTETRICS & GYNECOLOGY

## 2021-11-19 PROCEDURE — C1889 IMPLANT/INSERT DEVICE, NOC: HCPCS | Performed by: OBSTETRICS & GYNECOLOGY

## 2021-11-19 PROCEDURE — 25010000002 HYDROMORPHONE PER 4 MG: Performed by: NURSE ANESTHETIST, CERTIFIED REGISTERED

## 2021-11-19 PROCEDURE — 86850 RBC ANTIBODY SCREEN: CPT | Performed by: OBSTETRICS & GYNECOLOGY

## 2021-11-19 PROCEDURE — L8606 SYNTHETIC IMPLNT URINARY 1ML: HCPCS | Performed by: OBSTETRICS & GYNECOLOGY

## 2021-11-19 PROCEDURE — 25010000002 DEXAMETHASONE PER 1 MG: Performed by: NURSE ANESTHETIST, CERTIFIED REGISTERED

## 2021-11-19 PROCEDURE — 25010000002 PROPOFOL 10 MG/ML EMULSION: Performed by: NURSE ANESTHETIST, CERTIFIED REGISTERED

## 2021-11-19 PROCEDURE — 86900 BLOOD TYPING SEROLOGIC ABO: CPT | Performed by: OBSTETRICS & GYNECOLOGY

## 2021-11-19 PROCEDURE — 25010000002 HEPARIN (PORCINE) PER 1000 UNITS: Performed by: OBSTETRICS & GYNECOLOGY

## 2021-11-19 PROCEDURE — 81025 URINE PREGNANCY TEST: CPT | Performed by: ANESTHESIOLOGY

## 2021-11-19 PROCEDURE — 25010000002 ONDANSETRON PER 1 MG: Performed by: NURSE ANESTHETIST, CERTIFIED REGISTERED

## 2021-11-19 PROCEDURE — 0 CEFAZOLIN IN DEXTROSE 2-4 GM/100ML-% SOLUTION: Performed by: OBSTETRICS & GYNECOLOGY

## 2021-11-19 DEVICE — INJ AGENT/BULK URETH COAPITITE 1ML: Type: IMPLANTABLE DEVICE | Site: URETHRA | Status: FUNCTIONAL

## 2021-11-19 DEVICE — SEAL HEMO SURG ARISTA/AH ABS/PWDR 3GM: Type: IMPLANTABLE DEVICE | Site: ABDOMEN | Status: FUNCTIONAL

## 2021-11-19 RX ORDER — MIDAZOLAM HYDROCHLORIDE 1 MG/ML
1 INJECTION INTRAMUSCULAR; INTRAVENOUS
Status: DISCONTINUED | OUTPATIENT
Start: 2021-11-19 | End: 2021-11-19 | Stop reason: HOSPADM

## 2021-11-19 RX ORDER — SODIUM CHLORIDE 0.9 % (FLUSH) 0.9 %
3-10 SYRINGE (ML) INJECTION AS NEEDED
Status: DISCONTINUED | OUTPATIENT
Start: 2021-11-19 | End: 2021-11-19 | Stop reason: HOSPADM

## 2021-11-19 RX ORDER — FAMOTIDINE 10 MG/ML
20 INJECTION, SOLUTION INTRAVENOUS ONCE
Status: COMPLETED | OUTPATIENT
Start: 2021-11-19 | End: 2021-11-19

## 2021-11-19 RX ORDER — LIDOCAINE HYDROCHLORIDE 10 MG/ML
0.5 INJECTION, SOLUTION EPIDURAL; INFILTRATION; INTRACAUDAL; PERINEURAL ONCE AS NEEDED
Status: DISCONTINUED | OUTPATIENT
Start: 2021-11-19 | End: 2021-11-19 | Stop reason: HOSPADM

## 2021-11-19 RX ORDER — CEFAZOLIN SODIUM 2 G/100ML
2 INJECTION, SOLUTION INTRAVENOUS ONCE
Status: COMPLETED | OUTPATIENT
Start: 2021-11-19 | End: 2021-11-19

## 2021-11-19 RX ORDER — EPHEDRINE SULFATE 50 MG/ML
5 INJECTION, SOLUTION INTRAVENOUS ONCE AS NEEDED
Status: DISCONTINUED | OUTPATIENT
Start: 2021-11-19 | End: 2021-11-19 | Stop reason: HOSPADM

## 2021-11-19 RX ORDER — DEXAMETHASONE SODIUM PHOSPHATE 10 MG/ML
INJECTION INTRAMUSCULAR; INTRAVENOUS AS NEEDED
Status: DISCONTINUED | OUTPATIENT
Start: 2021-11-19 | End: 2021-11-19 | Stop reason: SURG

## 2021-11-19 RX ORDER — NALOXONE HCL 0.4 MG/ML
0.4 VIAL (ML) INJECTION
Status: DISCONTINUED | OUTPATIENT
Start: 2021-11-19 | End: 2021-11-20 | Stop reason: HOSPADM

## 2021-11-19 RX ORDER — MAGNESIUM HYDROXIDE 1200 MG/15ML
LIQUID ORAL AS NEEDED
Status: DISCONTINUED | OUTPATIENT
Start: 2021-11-19 | End: 2021-11-19 | Stop reason: HOSPADM

## 2021-11-19 RX ORDER — HYDROCODONE BITARTRATE AND ACETAMINOPHEN 7.5; 325 MG/1; MG/1
1 TABLET ORAL ONCE AS NEEDED
Status: DISCONTINUED | OUTPATIENT
Start: 2021-11-19 | End: 2021-11-19 | Stop reason: HOSPADM

## 2021-11-19 RX ORDER — IBUPROFEN 400 MG/1
400 TABLET ORAL
Status: DISCONTINUED | OUTPATIENT
Start: 2021-11-20 | End: 2021-11-20 | Stop reason: HOSPADM

## 2021-11-19 RX ORDER — ONDANSETRON 2 MG/ML
4 INJECTION INTRAMUSCULAR; INTRAVENOUS EVERY 6 HOURS PRN
Status: DISCONTINUED | OUTPATIENT
Start: 2021-11-19 | End: 2021-11-20 | Stop reason: HOSPADM

## 2021-11-19 RX ORDER — PROMETHAZINE HYDROCHLORIDE 12.5 MG/1
12.5 TABLET ORAL EVERY 6 HOURS PRN
Status: DISCONTINUED | OUTPATIENT
Start: 2021-11-19 | End: 2021-11-20 | Stop reason: HOSPADM

## 2021-11-19 RX ORDER — PROPOFOL 10 MG/ML
VIAL (ML) INTRAVENOUS AS NEEDED
Status: DISCONTINUED | OUTPATIENT
Start: 2021-11-19 | End: 2021-11-19 | Stop reason: SURG

## 2021-11-19 RX ORDER — KETAMINE HYDROCHLORIDE 10 MG/ML
INJECTION INTRAMUSCULAR; INTRAVENOUS AS NEEDED
Status: DISCONTINUED | OUTPATIENT
Start: 2021-11-19 | End: 2021-11-19 | Stop reason: SURG

## 2021-11-19 RX ORDER — IBUPROFEN 600 MG/1
600 TABLET ORAL ONCE AS NEEDED
Status: DISCONTINUED | OUTPATIENT
Start: 2021-11-19 | End: 2021-11-19 | Stop reason: HOSPADM

## 2021-11-19 RX ORDER — HYDROMORPHONE HYDROCHLORIDE 1 MG/ML
0.5 INJECTION, SOLUTION INTRAMUSCULAR; INTRAVENOUS; SUBCUTANEOUS
Status: DISCONTINUED | OUTPATIENT
Start: 2021-11-19 | End: 2021-11-19 | Stop reason: HOSPADM

## 2021-11-19 RX ORDER — PROMETHAZINE HYDROCHLORIDE 12.5 MG/1
12.5 SUPPOSITORY RECTAL EVERY 6 HOURS PRN
Status: DISCONTINUED | OUTPATIENT
Start: 2021-11-19 | End: 2021-11-20 | Stop reason: HOSPADM

## 2021-11-19 RX ORDER — CEFAZOLIN SODIUM 2 G/100ML
2 INJECTION, SOLUTION INTRAVENOUS EVERY 8 HOURS
Status: COMPLETED | OUTPATIENT
Start: 2021-11-20 | End: 2021-11-20

## 2021-11-19 RX ORDER — PROMETHAZINE HYDROCHLORIDE 25 MG/1
25 TABLET ORAL ONCE AS NEEDED
Status: DISCONTINUED | OUTPATIENT
Start: 2021-11-19 | End: 2021-11-19 | Stop reason: HOSPADM

## 2021-11-19 RX ORDER — ESTRADIOL 0.1 MG/G
CREAM VAGINAL AS NEEDED
Status: DISCONTINUED | OUTPATIENT
Start: 2021-11-19 | End: 2021-11-19 | Stop reason: HOSPADM

## 2021-11-19 RX ORDER — SODIUM CHLORIDE 0.9 % (FLUSH) 0.9 %
3 SYRINGE (ML) INJECTION EVERY 12 HOURS SCHEDULED
Status: DISCONTINUED | OUTPATIENT
Start: 2021-11-19 | End: 2021-11-19 | Stop reason: HOSPADM

## 2021-11-19 RX ORDER — FENTANYL CITRATE 50 UG/ML
50 INJECTION, SOLUTION INTRAMUSCULAR; INTRAVENOUS
Status: DISCONTINUED | OUTPATIENT
Start: 2021-11-19 | End: 2021-11-19 | Stop reason: HOSPADM

## 2021-11-19 RX ORDER — PHENAZOPYRIDINE HYDROCHLORIDE 200 MG/1
200 TABLET, FILM COATED ORAL ONCE
Status: COMPLETED | OUTPATIENT
Start: 2021-11-19 | End: 2021-11-19

## 2021-11-19 RX ORDER — OXYCODONE AND ACETAMINOPHEN 10; 325 MG/1; MG/1
1 TABLET ORAL EVERY 4 HOURS PRN
Status: DISCONTINUED | OUTPATIENT
Start: 2021-11-19 | End: 2021-11-19 | Stop reason: HOSPADM

## 2021-11-19 RX ORDER — LIDOCAINE HYDROCHLORIDE 20 MG/ML
INJECTION, SOLUTION INFILTRATION; PERINEURAL AS NEEDED
Status: DISCONTINUED | OUTPATIENT
Start: 2021-11-19 | End: 2021-11-19 | Stop reason: SURG

## 2021-11-19 RX ORDER — HYDRALAZINE HYDROCHLORIDE 20 MG/ML
5 INJECTION INTRAMUSCULAR; INTRAVENOUS
Status: DISCONTINUED | OUTPATIENT
Start: 2021-11-19 | End: 2021-11-19 | Stop reason: HOSPADM

## 2021-11-19 RX ORDER — MORPHINE SULFATE 2 MG/ML
1 INJECTION, SOLUTION INTRAMUSCULAR; INTRAVENOUS
Status: DISCONTINUED | OUTPATIENT
Start: 2021-11-19 | End: 2021-11-20 | Stop reason: HOSPADM

## 2021-11-19 RX ORDER — NALOXONE HCL 0.4 MG/ML
0.2 VIAL (ML) INJECTION AS NEEDED
Status: DISCONTINUED | OUTPATIENT
Start: 2021-11-19 | End: 2021-11-19 | Stop reason: HOSPADM

## 2021-11-19 RX ORDER — ONDANSETRON 2 MG/ML
INJECTION INTRAMUSCULAR; INTRAVENOUS AS NEEDED
Status: DISCONTINUED | OUTPATIENT
Start: 2021-11-19 | End: 2021-11-19 | Stop reason: SURG

## 2021-11-19 RX ORDER — SODIUM CHLORIDE 0.9 % (FLUSH) 0.9 %
10 SYRINGE (ML) INJECTION AS NEEDED
Status: DISCONTINUED | OUTPATIENT
Start: 2021-11-19 | End: 2021-11-19 | Stop reason: HOSPADM

## 2021-11-19 RX ORDER — HYDROMORPHONE HCL 110MG/55ML
PATIENT CONTROLLED ANALGESIA SYRINGE INTRAVENOUS AS NEEDED
Status: DISCONTINUED | OUTPATIENT
Start: 2021-11-19 | End: 2021-11-19 | Stop reason: SURG

## 2021-11-19 RX ORDER — SODIUM CHLORIDE, SODIUM LACTATE, POTASSIUM CHLORIDE, CALCIUM CHLORIDE 600; 310; 30; 20 MG/100ML; MG/100ML; MG/100ML; MG/100ML
9 INJECTION, SOLUTION INTRAVENOUS CONTINUOUS
Status: DISCONTINUED | OUTPATIENT
Start: 2021-11-19 | End: 2021-11-20 | Stop reason: HOSPADM

## 2021-11-19 RX ORDER — BUPIVACAINE HYDROCHLORIDE AND EPINEPHRINE 2.5; 5 MG/ML; UG/ML
INJECTION, SOLUTION EPIDURAL; INFILTRATION; INTRACAUDAL; PERINEURAL AS NEEDED
Status: DISCONTINUED | OUTPATIENT
Start: 2021-11-19 | End: 2021-11-19 | Stop reason: HOSPADM

## 2021-11-19 RX ORDER — ROCURONIUM BROMIDE 10 MG/ML
INJECTION, SOLUTION INTRAVENOUS AS NEEDED
Status: DISCONTINUED | OUTPATIENT
Start: 2021-11-19 | End: 2021-11-19 | Stop reason: SURG

## 2021-11-19 RX ORDER — DIPHENHYDRAMINE HYDROCHLORIDE 50 MG/ML
12.5 INJECTION INTRAMUSCULAR; INTRAVENOUS
Status: DISCONTINUED | OUTPATIENT
Start: 2021-11-19 | End: 2021-11-19 | Stop reason: HOSPADM

## 2021-11-19 RX ORDER — FLUMAZENIL 0.1 MG/ML
0.2 INJECTION INTRAVENOUS AS NEEDED
Status: DISCONTINUED | OUTPATIENT
Start: 2021-11-19 | End: 2021-11-19 | Stop reason: HOSPADM

## 2021-11-19 RX ORDER — ONDANSETRON 4 MG/1
4 TABLET, FILM COATED ORAL EVERY 6 HOURS PRN
Status: DISCONTINUED | OUTPATIENT
Start: 2021-11-19 | End: 2021-11-20 | Stop reason: HOSPADM

## 2021-11-19 RX ORDER — PROMETHAZINE HYDROCHLORIDE 25 MG/1
25 SUPPOSITORY RECTAL ONCE AS NEEDED
Status: DISCONTINUED | OUTPATIENT
Start: 2021-11-19 | End: 2021-11-19 | Stop reason: HOSPADM

## 2021-11-19 RX ORDER — LABETALOL HYDROCHLORIDE 5 MG/ML
5 INJECTION, SOLUTION INTRAVENOUS
Status: DISCONTINUED | OUTPATIENT
Start: 2021-11-19 | End: 2021-11-19 | Stop reason: HOSPADM

## 2021-11-19 RX ORDER — OXYCODONE HYDROCHLORIDE AND ACETAMINOPHEN 5; 325 MG/1; MG/1
1 TABLET ORAL EVERY 4 HOURS PRN
Status: DISCONTINUED | OUTPATIENT
Start: 2021-11-19 | End: 2021-11-20 | Stop reason: HOSPADM

## 2021-11-19 RX ORDER — ONDANSETRON 2 MG/ML
4 INJECTION INTRAMUSCULAR; INTRAVENOUS ONCE AS NEEDED
Status: DISCONTINUED | OUTPATIENT
Start: 2021-11-19 | End: 2021-11-19 | Stop reason: HOSPADM

## 2021-11-19 RX ORDER — FENTANYL CITRATE 50 UG/ML
INJECTION, SOLUTION INTRAMUSCULAR; INTRAVENOUS AS NEEDED
Status: DISCONTINUED | OUTPATIENT
Start: 2021-11-19 | End: 2021-11-19 | Stop reason: SURG

## 2021-11-19 RX ORDER — OXYCODONE AND ACETAMINOPHEN 10; 325 MG/1; MG/1
1 TABLET ORAL EVERY 4 HOURS PRN
Status: DISCONTINUED | OUTPATIENT
Start: 2021-11-19 | End: 2021-11-20 | Stop reason: HOSPADM

## 2021-11-19 RX ORDER — CEFAZOLIN SODIUM 2 G/100ML
INJECTION, SOLUTION INTRAVENOUS AS NEEDED
Status: DISCONTINUED | OUTPATIENT
Start: 2021-11-19 | End: 2021-11-19 | Stop reason: SURG

## 2021-11-19 RX ORDER — SODIUM CHLORIDE, SODIUM LACTATE, POTASSIUM CHLORIDE, CALCIUM CHLORIDE 600; 310; 30; 20 MG/100ML; MG/100ML; MG/100ML; MG/100ML
100 INJECTION, SOLUTION INTRAVENOUS CONTINUOUS
Status: DISCONTINUED | OUTPATIENT
Start: 2021-11-19 | End: 2021-11-20 | Stop reason: HOSPADM

## 2021-11-19 RX ORDER — MORPHINE SULFATE 2 MG/ML
2 INJECTION, SOLUTION INTRAMUSCULAR; INTRAVENOUS
Status: DISCONTINUED | OUTPATIENT
Start: 2021-11-19 | End: 2021-11-20 | Stop reason: HOSPADM

## 2021-11-19 RX ORDER — DOCUSATE SODIUM 100 MG/1
100 CAPSULE, LIQUID FILLED ORAL 2 TIMES DAILY
Status: DISCONTINUED | OUTPATIENT
Start: 2021-11-19 | End: 2021-11-20 | Stop reason: HOSPADM

## 2021-11-19 RX ORDER — DIPHENHYDRAMINE HCL 25 MG
25 CAPSULE ORAL
Status: DISCONTINUED | OUTPATIENT
Start: 2021-11-19 | End: 2021-11-19 | Stop reason: HOSPADM

## 2021-11-19 RX ADMIN — PHENAZOPYRIDINE 200 MG: 200 TABLET ORAL at 12:53

## 2021-11-19 RX ADMIN — OXYCODONE AND ACETAMINOPHEN 1 TABLET: 325; 10 TABLET ORAL at 22:56

## 2021-11-19 RX ADMIN — IBUPROFEN 400 MG: 400 TABLET, FILM COATED ORAL at 23:46

## 2021-11-19 RX ADMIN — ROCURONIUM BROMIDE 10 MG: 50 INJECTION INTRAVENOUS at 16:29

## 2021-11-19 RX ADMIN — MIDAZOLAM 1 MG: 1 INJECTION INTRAMUSCULAR; INTRAVENOUS at 14:30

## 2021-11-19 RX ADMIN — LIDOCAINE HYDROCHLORIDE 60 MG: 20 INJECTION, SOLUTION INFILTRATION; PERINEURAL at 14:57

## 2021-11-19 RX ADMIN — FENTANYL CITRATE 50 MCG: 50 INJECTION INTRAMUSCULAR; INTRAVENOUS at 21:06

## 2021-11-19 RX ADMIN — PROPOFOL 50 MG: 10 INJECTION, EMULSION INTRAVENOUS at 15:37

## 2021-11-19 RX ADMIN — CEFAZOLIN SODIUM 2 G: 2 INJECTION, SOLUTION INTRAVENOUS at 14:46

## 2021-11-19 RX ADMIN — SODIUM CHLORIDE, POTASSIUM CHLORIDE, SODIUM LACTATE AND CALCIUM CHLORIDE 9 ML/HR: 600; 310; 30; 20 INJECTION, SOLUTION INTRAVENOUS at 13:05

## 2021-11-19 RX ADMIN — DOCUSATE SODIUM 100 MG: 100 CAPSULE, LIQUID FILLED ORAL at 23:48

## 2021-11-19 RX ADMIN — ROCURONIUM BROMIDE 15 MG: 50 INJECTION INTRAVENOUS at 17:20

## 2021-11-19 RX ADMIN — SODIUM CHLORIDE, POTASSIUM CHLORIDE, SODIUM LACTATE AND CALCIUM CHLORIDE: 600; 310; 30; 20 INJECTION, SOLUTION INTRAVENOUS at 16:04

## 2021-11-19 RX ADMIN — DEXAMETHASONE SODIUM PHOSPHATE 8 MG: 10 INJECTION INTRAMUSCULAR; INTRAVENOUS at 15:16

## 2021-11-19 RX ADMIN — ROCURONIUM BROMIDE 15 MG: 50 INJECTION INTRAVENOUS at 15:37

## 2021-11-19 RX ADMIN — PROPOFOL 150 MG: 10 INJECTION, EMULSION INTRAVENOUS at 14:57

## 2021-11-19 RX ADMIN — FENTANYL CITRATE 50 MCG: 50 INJECTION INTRAMUSCULAR; INTRAVENOUS at 21:44

## 2021-11-19 RX ADMIN — KETAMINE HYDROCHLORIDE 40 MG: 10 INJECTION INTRAMUSCULAR; INTRAVENOUS at 15:15

## 2021-11-19 RX ADMIN — ONDANSETRON 4 MG: 2 INJECTION INTRAMUSCULAR; INTRAVENOUS at 19:16

## 2021-11-19 RX ADMIN — ROCURONIUM BROMIDE 50 MG: 50 INJECTION INTRAVENOUS at 14:57

## 2021-11-19 RX ADMIN — FAMOTIDINE 20 MG: 10 INJECTION INTRAVENOUS at 13:05

## 2021-11-19 RX ADMIN — HYDROMORPHONE HYDROCHLORIDE 0.5 MG: 2 INJECTION, SOLUTION INTRAMUSCULAR; INTRAVENOUS; SUBCUTANEOUS at 17:22

## 2021-11-19 RX ADMIN — HYDROMORPHONE HYDROCHLORIDE 0.5 MG: 1 INJECTION, SOLUTION INTRAMUSCULAR; INTRAVENOUS; SUBCUTANEOUS at 21:25

## 2021-11-19 RX ADMIN — KETAMINE HYDROCHLORIDE 10 MG: 10 INJECTION INTRAMUSCULAR; INTRAVENOUS at 15:37

## 2021-11-19 RX ADMIN — FENTANYL CITRATE 100 MCG: 0.05 INJECTION, SOLUTION INTRAMUSCULAR; INTRAVENOUS at 14:57

## 2021-11-19 RX ADMIN — ROCURONIUM BROMIDE 10 MG: 50 INJECTION INTRAVENOUS at 18:13

## 2021-11-19 RX ADMIN — FENTANYL CITRATE 100 MCG: 0.05 INJECTION, SOLUTION INTRAMUSCULAR; INTRAVENOUS at 19:45

## 2021-11-19 RX ADMIN — CEFAZOLIN SODIUM 2 G: 2 INJECTION, SOLUTION INTRAVENOUS at 18:45

## 2021-11-19 NOTE — H&P
Female Pelvic Medicine and Reconstructive Surgery   History & Physical    Patient Identification:  Name: Eleni Vaughan Age: 45 y.o. Sex: female :  1975 MRN: Female Pelvic Medicine and Reconstructive Surgery   History & Physical    Patient Identification:  Name: Eleni Vaughan Age: 45 y.o. Sex: female :  1975 MRN: [id number]                            Problem List:    Rectal bleeding    Umbilical hernia without obstruction and without gangrene    Uterovaginal prolapse, incomplete    Cystocele, midline    Cystocele, lateral    Vaginal enterocele    Loss of perineal body, female    Female stress incontinence    Urethral sphincter deficiency, intrinsic (ISD)    Feeling of incomplete bladder emptying    Past Medical History:  Past Medical History:   Diagnosis Date   • Arthritis     LEFT KNEE WITH LEFT KNEE PAIN   • Breast pain in female    • Corneal scarring     SOLEDAD EYES-INJURY AS CHILD   • H/O heartburn     ON OCC   • History of chicken pox    • History of depression    • History of heart murmur in childhood    • History of uterine fibroid    • Personal history of COVID-19     DEC 2020   • Seasonal allergies    • Stress bladder incontinence, female    • Threatened miscarriage    • Uterine prolapse     STATES SOME RECTAL   • Vaginal delivery      Past Surgical History:  Past Surgical History:   Procedure Laterality Date   • DILATATION AND CURETTAGE     • HERNIA REPAIR Right     INGUINAL   • KNEE ACL RECONSTRUCTION Left 2018    Procedure: REVISION KNEE ANTERIOR CRUCIATE LIGAMENT RECONSTRUCTION WITH HAMSTRING AUTOGRAFT, possible bone patella tendon bone allograft, hardware removal. meniscal and cartilage surgery as indicated;  Surgeon: Matthew Cooper MD;  Location: [facility];  Service: Orthopedics   • KNEE SURGERY Left     ACL recon BTB   • WISDOM TOOTH EXTRACTION        Home Meds:  Medications Prior to Admission   Medication Sig Dispense Refill Last Dose   • CHLORHEXIDINE  GLUCONATE CLOTH EX Apply  topically Take As Directed. AS DIRECTED    11/19/2021 at 1000   • ascorbic acid (VITAMIN C) 1000 MG tablet Take 1,000 mg by mouth Daily.   11/12/2021   • Ergocalciferol (Vitamin D2) 50 MCG (2000 UT) tablet Take 2,000 Units by mouth Daily.   11/12/2021   • glucosamine-chondroitin 500-400 MG capsule capsule Take 2 capsules by mouth Daily.   11/12/2021   • Magnesium ([employer] Magnesium) 250 MG tablet Take 250 mg by mouth Daily.   11/17/2021   • multivitamin with minerals (ONE DAILY WOMENS PO) Take 1 tablet by mouth Daily.   11/5/2021   • Omega-3 Fatty Acids (fish oil) 1200 MG capsule capsule Take 1,200 mg by mouth Daily. HOLD FOR SURGERY   11/5/2021   • TURMERIC PO Take 2,000 mg by mouth Daily. HOLD FOR SURGERY   11/12/2021   • Zinc 50 MG capsule Take 50 mg by mouth Daily.   11/12/2021     Current Meds:     Current Facility-Administered Medications:   •  ceFAZolin in dextrose (ANCEF) IVPB solution 2 g, 2 g, Intravenous, Once, Yadira Christensen MD  •  heparin (porcine) 5000 UNIT/ML 10,000 Units, Sodium Bicarbonate 8.4 % 5 mL, triamcinolone acetonide (KENALOG-40) 20 mg, lidocaine PF 2% (XYLOCAINE) 10 mL, lidocaine (UROJET) 10 mL OR irrigation, , Irrigation, Once, Yadira Christensen MD  •  lactated ringers infusion, 9 mL/hr, Intravenous, Continuous, Soham Garcia MD, Last Rate: 9 mL/hr at 11/19/21 1305, 9 mL/hr at 11/19/21 1305  •  lidocaine PF 1% (XYLOCAINE) injection 0.5 mL, 0.5 mL, Injection, Once PRN, Soham Garcia MD  •  midazolam (VERSED) injection 1 mg, 1 mg, Intravenous, Q10 Min PRN, Soham Garcia MD  •  sodium chloride 0.9 % flush 10 mL, 10 mL, Intravenous, PRN, Yadira Christensen MD  •  sodium chloride 0.9 % flush 3 mL, 3 mL, Intravenous, Q12H, Yadira Christensen MD  •  sodium chloride 0.9 % flush 3 mL, 3 mL, Intravenous, Q12H, Soham Garcia MD  •  sodium chloride 0.9 % flush 3-10 mL, 3-10 mL, Intravenous, PRN, Soham Garcia MD  Allergies:  Allergies    Allergen Reactions   • Morphine And Related Nausea And Vomiting   • Sulfa Antibiotics Hives     Immunizations:    There is no immunization history on file for this patient.  Social History:   Social History     Tobacco Use   • Smoking status: Former Smoker     Years: 5.00     Types: Cigarettes     Quit date:      Years since quittin.8   • Smokeless tobacco: Never Used   • Tobacco comment: OCCASIONAL   Substance Use Topics   • Alcohol use: Yes     Alcohol/week: 1.0 standard drink     Types: 1 Glasses of wine per week     Comment: SOCIALLY      Family History:  Family History   Problem Relation Age of Onset   • Hyperlipidemia Mother    • Lung cancer Mother         dx 2020    • Alcohol abuse Father    • Other Father          at 40 car accident    • No Known Problems Sister    • No Known Problems Brother    • No Known Problems Daughter    • No Known Problems Son    • Leukemia Maternal Grandmother    • No Known Problems Paternal Grandmother    • No Known Problems Maternal Aunt    • No Known Problems Paternal Aunt    • BRCA 1/2 Neg Hx    • Breast cancer Neg Hx    • Colon cancer Neg Hx    • Endometrial cancer Neg Hx    • Ovarian cancer Neg Hx    • Malig Hyperthermia Neg Hx         Review of Systems  Constitutional:  Denies fever or chills   Eyes:  Denies change in visual acuity   HEENT:  Denies nasal congestion or sore throat   Respiratory:  Denies cough or shortness of breath   Cardiovascular:  Denies chest pain or edema   GI:  Denies abdominal pain, nausea, vomiting, bloody stools or diarrhea   :  Denies dysuria   Musculoskeletal:  Denies back pain or joint pain   Integument:  Denies rash   Neurologic:  Denies headache, focal weakness or sensory changes   Endocrine:  Denies polyuria or polydipsia   Lymphatic:  Denies swollen glands   Psychiatric:  Denies depression or anxiety       Objective:  tMax 24 hrs: Temp (24hrs), Av.5 °F (36.4 °C), Min:97.5 °F (36.4 °C), Max:97.5 °F (36.4 °C)    Vitals Ranges:  "  Temp:  [97.5 °F (36.4 °C)] 97.5 °F (36.4 °C)  Heart Rate:  [66] 66  Resp:  [18] 18  BP: (124)/(83) 124/83    Exam:  Vital Signs: /83 (BP Location: Left arm, Patient Position: Lying)   Pulse 66   Temp 97.5 °F (36.4 °C) (Oral)   Resp 18   Ht 182.9 cm (72\")   Wt 90.1 kg (198 lb 11.2 oz)   LMP 11/05/2021   SpO2 98%   BMI 26.95 kg/m²   Constitutional:  Well developed, well nourished, no acute distress, non-toxic appearance    GI:  Soft, nondistended, normal bowel sounds, nontender, no organomegaly, no mass, no rebound, no guarding   :  No costovertebral angle tenderness   Musculoskeletal:  No edema, no tenderness, no deformities. Back- no tenderness  Integument:  Well hydrated, no rash   Lymphatic:  No lymphadenopathy noted   Neurologic:  Alert & oriented x 3,  Pelvic:   POPQ  +1  +1   0    6   2    9  +1   +1   -4     LPP 60 cm H2O     Pap neg   9  10  20  EMB  6   8  2021  neg     Assessment:    Rectal bleeding    Umbilical hernia without obstruction and without gangrene    Uterovaginal prolapse, incomplete    Cystocele, midline    Cystocele, lateral    Vaginal enterocele    Loss of perineal body, female    Female stress incontinence    Urethral sphincter deficiency, intrinsic (ISD)    Feeling of incomplete bladder emptying        Plan:  Laparoscopic uterosacral ligament colpopexy sacral colpopexy   Laparoscopic paravaginal repair   Laparoscopic hysterectomy with bilateral salpingectomy   Possible laparoscopic unilateral or bilateral oophorectomy   Laparoscopic abdominal enterocele repair   Posterior colporrhaphy   Anterior colporrhaphy   Extraperitoneal colpopexy sacrospinous ligament fixation   umbilical hernia repair   Cystourethroscopy with transurethral bulking    Yadira Christensen MD  11/19/2021    "

## 2021-11-19 NOTE — ANESTHESIA PROCEDURE NOTES
Airway  Urgency: elective    Date/Time: 11/19/2021 3:01 PM  Airway not difficult    General Information and Staff    Patient location during procedure: OR  CRNA: Viridiana Harden CRNA    Indications and Patient Condition  Indications for airway management: airway protection    Preoxygenated: yes  Mask difficulty assessment: 1 - vent by mask    Final Airway Details  Final airway type: endotracheal airway      Successful airway: ETT  Cuffed: yes   Successful intubation technique: direct laryngoscopy  Endotracheal tube insertion site: oral  Blade: Mariana  Blade size: 3  ETT size (mm): 7.0  Cormack-Lehane Classification: grade I - full view of glottis  Placement verified by: chest auscultation and capnometry   Cuff volume (mL): 6  Measured from: lips  ETT/EBT  to lips (cm): 20  Number of attempts at approach: 1  Assessment: lips, teeth, and gum same as pre-op and atraumatic intubation    Additional Comments  Smooth IV induction. Trachea intubated. Cuff up. Ett secured. BEBS. Dentition intact without injury.

## 2021-11-19 NOTE — ANESTHESIA PREPROCEDURE EVALUATION
Anesthesia Evaluation     Patient summary reviewed and Nursing notes reviewed   NPO Solid Status: > 8 hours  NPO Liquid Status: > 2 hours           Airway   Mallampati: II  TM distance: >3 FB  Neck ROM: full  no difficulty expected  Dental - normal exam     Pulmonary - normal exam   Cardiovascular - normal exam        Neuro/Psych  GI/Hepatic/Renal/Endo      Musculoskeletal     Abdominal  - normal exam   Substance History      OB/GYN          Other   arthritis,                      Anesthesia Plan    ASA 1     general     intravenous induction     Anesthetic plan, all risks, benefits, and alternatives have been provided, discussed and informed consent has been obtained with: patient.

## 2021-11-20 VITALS
TEMPERATURE: 98.2 F | OXYGEN SATURATION: 97 % | SYSTOLIC BLOOD PRESSURE: 90 MMHG | BODY MASS INDEX: 26.91 KG/M2 | WEIGHT: 198.7 LBS | HEIGHT: 72 IN | HEART RATE: 64 BPM | DIASTOLIC BLOOD PRESSURE: 54 MMHG | RESPIRATION RATE: 16 BRPM

## 2021-11-20 PROBLEM — R33.8 POSTOPERATIVE URINARY RETENTION: Status: ACTIVE | Noted: 2021-11-20

## 2021-11-20 PROBLEM — N99.89 POSTOPERATIVE URINARY RETENTION: Status: ACTIVE | Noted: 2021-11-20

## 2021-11-20 LAB
HCT VFR BLD AUTO: 33.2 % (ref 34–46.6)
HGB BLD-MCNC: 11.6 G/DL (ref 12–15.9)

## 2021-11-20 PROCEDURE — 25010000002 ENOXAPARIN PER 10 MG: Performed by: OBSTETRICS & GYNECOLOGY

## 2021-11-20 PROCEDURE — 0 CEFAZOLIN IN DEXTROSE 2-4 GM/100ML-% SOLUTION: Performed by: OBSTETRICS & GYNECOLOGY

## 2021-11-20 PROCEDURE — 85018 HEMOGLOBIN: CPT | Performed by: OBSTETRICS & GYNECOLOGY

## 2021-11-20 PROCEDURE — 85014 HEMATOCRIT: CPT | Performed by: OBSTETRICS & GYNECOLOGY

## 2021-11-20 PROCEDURE — G0378 HOSPITAL OBSERVATION PER HR: HCPCS

## 2021-11-20 RX ADMIN — CEFAZOLIN SODIUM 2 G: 2 INJECTION, SOLUTION INTRAVENOUS at 01:22

## 2021-11-20 RX ADMIN — OXYCODONE AND ACETAMINOPHEN 1 TABLET: 5; 325 TABLET ORAL at 16:14

## 2021-11-20 RX ADMIN — IBUPROFEN 400 MG: 400 TABLET, FILM COATED ORAL at 16:14

## 2021-11-20 RX ADMIN — CEFAZOLIN SODIUM 2 G: 2 INJECTION, SOLUTION INTRAVENOUS at 08:57

## 2021-11-20 RX ADMIN — ENOXAPARIN SODIUM 40 MG: 40 INJECTION SUBCUTANEOUS at 08:57

## 2021-11-20 RX ADMIN — OXYCODONE AND ACETAMINOPHEN 1 TABLET: 325; 10 TABLET ORAL at 03:05

## 2021-11-20 RX ADMIN — IBUPROFEN 400 MG: 400 TABLET, FILM COATED ORAL at 12:02

## 2021-11-20 RX ADMIN — OXYCODONE AND ACETAMINOPHEN 1 TABLET: 325; 10 TABLET ORAL at 12:02

## 2021-11-20 RX ADMIN — IBUPROFEN 400 MG: 400 TABLET, FILM COATED ORAL at 04:09

## 2021-11-20 RX ADMIN — SODIUM CHLORIDE, POTASSIUM CHLORIDE, SODIUM LACTATE AND CALCIUM CHLORIDE 100 ML/HR: 600; 310; 30; 20 INJECTION, SOLUTION INTRAVENOUS at 04:09

## 2021-11-20 RX ADMIN — DOCUSATE SODIUM 100 MG: 100 CAPSULE, LIQUID FILLED ORAL at 08:57

## 2021-11-20 RX ADMIN — IBUPROFEN 400 MG: 400 TABLET, FILM COATED ORAL at 08:57

## 2021-11-20 RX ADMIN — OXYCODONE AND ACETAMINOPHEN 1 TABLET: 325; 10 TABLET ORAL at 07:19

## 2021-11-20 NOTE — ANESTHESIA POSTPROCEDURE EVALUATION
Patient: Eleni Vaughan    Procedure Summary     Date: 11/19/21 Room / Location: [facility] OR [address] [place] [address] MAIN OR    Anesthesia Start: 1452 Anesthesia Stop: 2050    Procedure: Laparoscopic uterosacral ligament colpopexy sacral colpopexy Laparoscopic paravaginal repair Laparoscopic hysterectomy with bilateral salpingectomy Laparoscopic abdominal enterocele repair Posterior colporrhaphy umbilical hernia repair Cystourethroscopy with urethral bulking (Bilateral Abdomen) Diagnosis:       Uterovaginal prolapse, incomplete      Cystocele, midline      Cystocele, lateral      Vaginal enterocele      Rectocele      Loss of perineal body, female      Female stress incontinence      Urethral sphincter deficiency, intrinsic (ISD)      Feeling of incomplete bladder emptying      Umbilical hernia without obstruction and without gangrene      (Uterovaginal prolapse, incomplete [N81.2])      (Cystocele, midline [N81.11])      (Cystocele, lateral [N81.12])      (Vaginal enterocele [N81.5])      (Rectocele [N81.6])      (Loss of perineal body, female [N81.89])      (Female stress incontinence [N39.3])      (Urethral sphincter deficiency, intrinsic (ISD) [N36.42])      (Feeling of incomplete bladder emptying [R39.14])      (Umbilical hernia without obstruction and without gangrene [K42.9])    Surgeons: Yadira Christensen MD Provider: Wil Kim MD    Anesthesia Type: general ASA Status: 1          Anesthesia Type: general    Vitals  Vitals Value Taken Time   /73 11/19/21 2126   Temp 37 °C (98.6 °F) 11/19/21 2045   Pulse 69 11/19/21 2134   Resp 12 11/19/21 2121   SpO2 100 % 11/19/21 2134   Vitals shown include unvalidated device data.        Post Anesthesia Care and Evaluation    Patient location during evaluation: bedside  Patient participation: complete - patient participated  Level of consciousness: awake  Pain score: 2  Pain management: adequate  Airway patency: patent  Anesthetic complications: No anesthetic  "complications  PONV Status: none  Cardiovascular status: acceptable  Respiratory status: acceptable  Hydration status: acceptable    Comments: /76   Pulse 76   Temp 37 °C (98.6 °F) (Oral)   Resp 12   Ht 182.9 cm (72\")   Wt 90.1 kg (198 lb 11.2 oz)   [facility] 11/05/2021   SpO2 94%   BMI 26.95 kg/m²         "

## 2021-11-20 NOTE — PLAN OF CARE
Problem: Adult Inpatient Plan of Care  Goal: Plan of Care Review  Outcome: Ongoing, Progressing  Flowsheets (Taken 11/20/2021 0044)  Plan of Care Reviewed With:   patient   spouse  Outcome Summary: Admitted from PACU awake, alert and oriented with post op pain of 3/10 mainly cramoing and pressure and c/o urgency to void, casey cath intact with clear dark orange urine, on Regular diet, crackers and Ginger ale given, medicated with Percocet and Motrin, ice pack to abdomen, vag packing intact with small vaginal bleeding, will continue to monitor, will recheck labs and will walk in am, IS up to 2500   Goal Outcome Evaluation:  Plan of Care Reviewed With: patient, spouse           Outcome Summary: Admitted from PACU awake, alert and oriented with post op pain of 3/10 mainly cramoing and pressure and c/o urgency to void, casey cath intact with clear dark orange urine, on Regular diet, crackers and Ginger ale given, medicated with Percocet and Motrin, ice pack to abdomen, vag packing intact with small vaginal bleeding, will continue to monitor, will recheck labs and will walk in am, IS up to 2500

## 2021-11-20 NOTE — DISCHARGE SUMMARY
Physician Discharge Summary  Patient Identification:  Name: Eleni Vaughan  Age: 45 y.o.  Sex: female  :  1975  MRN: [id number]    Admit date: 2021    Discharge date and time: No discharge date for patient encounter.    Admitting Physician: Yadira Christensen MD    Discharge Physician: Yadira Christensen MD    Admission Diagnoses: Uterovaginal prolapse, incomplete [N81.2]  Cystocele, midline [N81.11]  Cystocele, lateral [N81.12]  Vaginal enterocele [N81.5]  Rectocele [N81.6]  Loss of perineal body, female [N81.89]  Female stress incontinence [N39.3]  Urethral sphincter deficiency, intrinsic (ISD) [N36.42]  Feeling of incomplete bladder emptying [R39.14]  Umbilical hernia without obstruction and without gangrene [K42.9]    Hospital Problems:   Active Problems:    Rectal bleeding    Umbilical hernia without obstruction and without gangrene    Uterovaginal prolapse, incomplete    Cystocele, midline    Cystocele, lateral    Vaginal enterocele    Loss of perineal body, female    Female stress incontinence    Urethral sphincter deficiency, intrinsic (ISD)    Feeling of incomplete bladder emptying        Discharge Diagnoses: same plus urinary retention post operative    Discharged Condition: good    Hospital Course: surgery and post operative care    Consults:   None    Discharge Exam:  Abdomen soft non tender  Bandages clean intact    Disposition: home      Patient Instructions:   [unfilled]   Follow-up Information     Marcy Kelly MD .    Specialty: Family Medicine  Contact information:  [address] [id number] [phone]                         Patient already has all post operative medications at home.  Copies scanned to media of medications and MANUEL report.  Patient to go home with urinary catheter in place  Call office Monday afternoon for catheter management    Signed:  Yadira Christensen MD  2021

## 2021-11-20 NOTE — OP NOTE
OPERATIVE NOTE    [facility] MAIN OR     Patient:    Eleni Vaughan    :   1975      Date of Operations:  2021    PREOPERATIVE DIAGNOSES:   1.  Uterovaginal prolapse, N81.2  2.  Cystocele, N81.11, N81.12   3.  Rectocele, N81.6  4.  Enterocele, N81.5   5.  Stress urinary incontinence, N39.3  6.  Intrinsic sphincter deficiency, N36.42  7.  Loss of perineal body, N81.89  8.  Incomplete bladder emptying, R 39.14  9.  Umbilical hernia, K42.9       POSTOPERATIVE DIAGNOSES: Same      SURGEON: Yadira Christensen M.D., MSc, FACOG, FACS     ASSISTANT: FOUZIA Montenegro          PROCEDURES PERFORMED:      1. Laparoscopic uterosacral ligament sacral colpopexy, 59232   2. Laparoscopic paravaginal repair, 22496  3. Laparoscopic hysterectomy with bilateral salpingectomy and left ovarian cystectomy, 95750  4.  Laparoscopic abdominal enterocele repair, 92336  5.  Posterior colporrhaphy, 43485   6.  Umbilical hernia repair, 10636  7.  Cystourethroscopy with transurethral bulking, 55730  8.  Cystourethroscopy with bladder instillation, 28219     FINDING(S): On cystourethroscopy following the procedures, there was bilateral efflux of Pyridium stained urine from both the right and the left ureteral orifices. There were no lesions or foreign material of the bladder or the urethra.  The right ovary and the remaining portion of the left ovary both appeared normal and remained in situ.       SPECIMEN(S):   1. Left fallopian tube    2. Right fallopian tube  3. Uterus and cervix   4. Left ovarian cyst      DESCRIPTION OF PROCEDURE(S): The patient was taken to the Operating Room with a peripheral IV in place. She underwent the induction of general endotracheal anesthesia, was prepped and draped in the dorsal lithotomy position in [facility]. Cystourethroscopy showed no lesions or foreign material of the bladder or the urethra and there was bilateral efflux of Pyridium stained urine from both the right and the  left ureteral orifices. The cystoscope was removed and a Quintanilla catheter was placed and set to straight drainage. The uterus sounded to 10 cm. A uterine manipulator was sewn into the uterine cervix for manipulation.  A left upper quadrant skin incision was made, a Veress needle inserted, and a pneumoperitoneum introduced. The Veress needle was removed and a 5 millimeter Optiview was placed in the left upper quadrant. Under direct visualization, an 11 millimeter left lateral and 5 milliliter suprapubic, sub-umbilical, and right lateral ports were placed. The patient was placed in Trendelenburg and the bowel lifted superiorly. The left fallopian tube and the right fallopian tube were removed with the Harmonic scalpel and sent to pathology. A left ovarian cyst was removed and sent to pathology. The right ovary and the remainder of the left ovary appeared normal and remained in situ. Hemostasis was achieved with Harmonic scalpel. The Harmonic scalpel was used to create a bladder flap anteriorly and to skeletonize and take the uterine arteries bilaterally. The monopolar J-hook cautery and the Harmonic scalpel was used to completely remove and detach the cervix from the vaginal apex. The uterus with cervix was passed off the field and sent to Pathology.  Hemostasis was excellent. The apex of the vagina was closed laparoscopically with interrupted 0 Vicryl sutures. With a Breisky retractor in the vagina, the posterior rectovaginal fascia was dissected from the rectovaginal serosa and with a Lucite dilator in the vagina, the pubocervical fascia was dissected from the pubocervical serosa. With a Briesky retractor in the vagina, the paravaginal tissue on the left and on the right was reattached with #1 Prolene and the uterosacral ligaments were attached to the paravaginal tissue ipsilaterally bilaterally to accomplish the laparoscopic bilateral paravaginal repair. With a Breisky retractor in the vagina, the uterosacral  ligaments were placed on stretch and #1 Prolene was used to go through the right uterosacral ligament, the right rectovaginal fascia, the right pubocervical fascia and held. #1 Prolene was used to go through the left uterosacral ligament, the left rectovaginal fascia, the left pubocervical fascia and both these sutures were tied accomplishing the laparoscopic uterosacral ligament colpopexy.  #1 Prolene was used through the abdominal approach of a laparoscopic enterocele repair attaching the superior most portion of the detached rectovaginal fascia and the pubocervical fascia at the apex of the vagina in the midline to each ipsilateral uterosacral ligament and these sutures were tied laparoscopically to accomplish the laparoscopic abdominal enterocele repair. Cystourethroscopy showed no lesions or foreign material of the bladder or the urethra and there was bilateral efflux of Pyridium stained urine from both the right and left ureteral orifices. The cystoscope was removed and a Quintanilla catheter was placed and set to straight drainage. The CO2 gas was allowed to escape and the left 11 millimeter port was closed with Andrea Panda Sure Close with 0 Vicryl. The remainder of the ports were removed under direct visualization and all were hemostatic and these were closed with 4-0 monocryl. The umbilical hernia was repaired with 0 Vicryl suture and the skin was closed with 4-0 Vicryl  A posterior vaginal incision was made and the rectum dissected from the vagina.  Zero Vicryl interrupted sutures were used plicate the   rectovaginal fascia in the midline to accomplish the posterior colporrhaphy and to reconstruct the perineal body.  The vaginal epithelium was closed with 2-0 Vicryl suture.  Cystourethroscopy showed bilateral efflux of Pyridium stained urine from both the right and left ureteral orifices. There were no lesions or foreign material of the bladder or the urethra. The cystoscope with the 30-degree lens was  inserted and Coaptite bulking agent was injected circumferentially at the level of the bladder neck consisting of 3 milliliters of Coaptite. This provided good coaptation of the urethra. The cystoscope was removed and the bladder drained and a bladder instillation was placed in the bladder and allowed to remain. A 12 F catheter was placed in the bladder. A vaginal packing was placed. The patient was taken out of the dorsal lithotomy position and taken to the recovery room in good condition. All final needle, sponge and instrument counts were reported as correct. There were no complications to the procedure.       ESTIMATED BLOOD LOSS:  150 milliliters.      FLUIDS:  2200 milliliters      URINE OUTPUT: 100 milliliters.               MARIA OLIVEIRA MD, MSc, FACOG, FACS

## 2021-11-22 LAB
LAB AP CASE REPORT: NORMAL
PATH REPORT.FINAL DX SPEC: NORMAL
PATH REPORT.GROSS SPEC: NORMAL

## 2021-11-22 NOTE — PROGRESS NOTES
Discharge Planning Assessment  [facility]     Patient Name: Eleni Vaughan  MRN: [id number]  Today's Date: 11/22/2021    Admit Date: 11/19/2021     Discharge Needs Assessment    No documentation.                Discharge Plan     Row Name 11/22/21 1506       Plan    Plan Home    Final Discharge Disposition Code 01 - home or self-care    Final Note home              Continued Care and Services - Discharged on 11/20/2021 Admission date: 11/19/2021 - Discharge disposition: Home or Self Care   Coordination has not been started for this encounter.       Expected Discharge Date and Time     Expected Discharge Date Expected Discharge Time    Nov 20, 2021          Demographic Summary    No documentation.                Functional Status    No documentation.                Psychosocial    No documentation.                Abuse/Neglect    No documentation.                Legal    No documentation.                Substance Abuse    No documentation.                Patient Forms    No documentation.                   Stephanie Patiño RN

## 2023-05-25 NOTE — PROGRESS NOTES
CC:Status post left knee ACL reconstruction with hamstring autograft and allograft supplement, partial lateral meniscectomy, DOS 6/18/2018    Interval history: Patient returns to clinic today, 4 weeks from surgery, doing well with physical therapy in regards to strengthening, range of motion, and return to normal gait.  Denies any locking, catching, or giving way of left knee.  Has continued to wear the brace as instructed.  Denies any numbness, tingling, or issues with incisions over the knee.    Physical exam:              Left knee:   Incisions- clean dry, and intact, healing well   Effusion moderate     ROM- 0 - 130 degrees   Strength- 4 of 5   stable to testing   Positive sensation light touch    Brisk cap refill    Imaging: three-view x-rays of left knee from today's visit including AP, lateral, and notch views, ordered and reviewed by me, compared to preoperative x-rays.  Findings included stable position of femoral and tibial bone tunnels as well as ACL hardware, no evidence of intra-articular loose body, anatomic alignment left knee, no evidence of fracture or subluxation.    Impression: Status post left knee ACL reconstruction, PARTIAL lateral meniscectomy    Plan:  1.  Continue with physical therapy 2 times per week for work on range of motion and strengthening.  2.  Can unlock hinge knee brace but continue use at all times during ambulation.  3.  Will follow-up in 4 weeks for reevaluation of motion and strength.  4.  All questions answered today       Complex Repair And Graft Additional Text (Will Appearing After The Standard Complex Repair Text): The complex repair was not sufficient to completely close the primary defect. The remaining additional defect was repaired with the graft mentioned below.

## 2024-07-09 ENCOUNTER — TELEPHONE (OUTPATIENT)
Dept: ORTHOPEDIC SURGERY | Facility: CLINIC | Age: 49
End: 2024-07-09

## 2024-07-09 NOTE — TELEPHONE ENCOUNTER
Provider: DR HORTA     Caller: PATIENT     Phone Number: [phone]    Reason for Call: PATIENT WAS REFERRED TO DR CERRATO BY DR HORTA FOR TREATMENT OF HER FOOT. THE OFFICE IS ASKING THAT DR HORTA SEND A REFERRAL TO FAX: [phone]

## 2024-07-09 NOTE — TELEPHONE ENCOUNTER
PATIENT CALLED DR CERRATO AND THEY ARE BACKED OUT FAR.  SHE ASKED WAS THERE ANY OTHER PODIATRIST HE RECOMMENDS

## 2025-02-23 NOTE — TELEPHONE ENCOUNTER
Eleni left a message requesting an order for a knee brace.  Her insurance is going to pay for it, I just need to send it in.  She didn't say specifically what kind of knee brace and I wasn't sure if you all had discussed her needing one.    [facility]  [phone]   [facility] provides services at a reduced cost to those who are determined to be eligible through [facility]’s financial assistance program. Information regarding [facility]’s financial assistance program can be found by going to [email or web address] or by calling [phone]. Renal colic, kidney stone/Other specify

## 2025-04-25 ENCOUNTER — OFFICE VISIT (OUTPATIENT)
Dept: OBSTETRICS AND GYNECOLOGY | Age: 50
End: 2025-04-25
Payer: COMMERCIAL

## 2025-04-25 VITALS
WEIGHT: 215 LBS | HEIGHT: 72 IN | DIASTOLIC BLOOD PRESSURE: 74 MMHG | SYSTOLIC BLOOD PRESSURE: 110 MMHG | BODY MASS INDEX: 29.12 KG/M2

## 2025-04-25 DIAGNOSIS — N39.3 SUI (STRESS URINARY INCONTINENCE, FEMALE): ICD-10-CM

## 2025-04-25 DIAGNOSIS — Z01.419 WELL FEMALE EXAM WITH ROUTINE GYNECOLOGICAL EXAM: Primary | ICD-10-CM

## 2025-04-25 DIAGNOSIS — Z11.51 SCREENING FOR HUMAN PAPILLOMAVIRUS (HPV): ICD-10-CM

## 2025-04-25 DIAGNOSIS — Z12.31 SCREENING MAMMOGRAM FOR BREAST CANCER: ICD-10-CM

## 2025-04-25 DIAGNOSIS — N81.4 CYSTOCELE WITH PROLAPSE: ICD-10-CM

## 2025-04-25 DIAGNOSIS — Z90.710 HISTORY OF HYSTERECTOMY: ICD-10-CM

## 2025-04-25 DIAGNOSIS — N95.1 MENOPAUSAL SYMPTOMS: ICD-10-CM

## 2025-04-25 RX ORDER — PHENTERMINE HYDROCHLORIDE 30 MG/1
30 CAPSULE ORAL EVERY MORNING
COMMUNITY

## 2025-04-25 NOTE — PROGRESS NOTES
Subjective     History of Present Illness    Chief Complaint   Patient presents with    Gynecologic Exam     New(old) gyn- last pap 9/10/2020 Neg(hysterectomy), Hpv Neg, mammo 12/3/2020,   CC: Incontinence & menopause        Eleni Vaughan is a 49 y.o. female  who presents for annual exam.  New patient, here to establish care.   Hx hysterectomy for prolapse in  with uro gyn. She did not have sling placed at that time. C/o still having CHRISTA. 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 Used to do pelvic floor PT, has not been. Tries to do exercises at home.   C/o still feels like she has symptoms of menstrual cycles. No hot flashes. Bought progesterone cream on [employer] and applies it to her feet, called progestro-life. Admits weight gain, on phentermine.   She had labs 3 days ago with [facility], she may start hormone therapy with them. No hx blood clots, stroke, CV events.  C/o ingrown hair on labia.  Has not had a Pap smear after hysterectomy.  Due for screening mammogram.  Last screening mammogram in  was negative.  Colonoscopy up-to-date, patient reports she had 5 years ago and it was normal.  Follow-up in 10 years.    Relevant data reviewed:  Mammo Screening Digital Tomosynthesis Bilateral With CAD (03/15/2022 10:32)     Obstetric History:  OB History          4    Para   2    Term   2            AB   2    Living   2         SAB   2    IAB        Ectopic        Molar        Multiple        Live Births   2               Menstrual History:     Patient's last menstrual period was 2021.           Current contraception: status post hysterectomy  History of abnormal Pap smear: yes - ASCUS / negative HPV  Received Gardasil immunization: no  Perform regular self breast exam: yes -    Family history of uterine or ovarian cancer: no  Family History of colon cancer: no  Family history of breast cancer: no    PAP: done today  Mammogram: ordered  Colonoscopy: up to date -  "pt reports normal 5 yrs ago, f/u 10 yrs  DEXA: not indicated    Exercise: very active  Calcium/Vitamin D: adequate intake and uses supplements    The following portions of the patient's history were reviewed and updated as appropriate: allergies, current medications, past family history, past medical history, past social history, past surgical history, and problem list.    Review of Systems  Pertinent items are noted in HPI.       Objective   Physical Exam    /74   Ht 182.9 cm (72\")   Wt 97.5 kg (215 lb)   LMP 11/05/2021   BMI 29.16 kg/m²      General: alert, appears stated age, cooperative, and no distress   Heart: regular rate and rhythm, S1, S2 normal, no murmur, click, rub or gallop   Lungs: clear to auscultation bilaterally   Abdomen: soft, non-tender, without masses or organomegaly   Breast: inspection negative, no nipple discharge or bleeding, no masses or nodularity palpable   External genitalia/Vulva: External genitalia including bartholin's glands, Urethra, Herrick's gland and urethra meatus are normal, Bladder appears normal without significant prolapse , and Cytocele is noted    Vagina: normal mucosa, normal discharge   Cervix: absent    Uterus: absent    Adnexa: normal adnexa and no mass, fullness, tenderness   Neurologic: Alert and Oriented x3   Psychiatric: Normal affect, judgement, and mood       Assessment & Plan   Diagnoses and all orders for this visit:    1. Well female exam with routine gynecological exam (Primary)  -     IGP, Apt HPV,rfx 16 / 18,45    2. Screening for human papillomavirus (HPV)  -     IGP, Apt HPV,rfx 16 / 18,45    3. Screening mammogram for breast cancer  -     Mammo Screening Digital Tomosynthesis Bilateral With CAD; Future    4. History of hysterectomy    5. CHRISTA (stress urinary incontinence, female)    6. Cystocele with prolapse    7. Menopausal symptoms          Screening mammogram ordered  Pap smear with HPV co-testing of vaginal wall completed today.  Will call " patient with results and treat accordingly.   All questions answered.  Breast self exam technique reviewed and patient encouraged to perform self-exam monthly.  Physical activity and regular exercise encouraged.  Discussed healthy lifestyle modifications.  Discussed calcium and vitamin D needs to prevent osteoporosis.    --Cystocele with prolapse / CHRISTA: Reviewed physical exam findings with patient.  Patient does not have any symptoms of feeling bulge or pressure from prolapse.  So she could try pelvic floor PT again or contact urogyn for bladder sling surgery.  Patient would like to try pelvic floor PT first, offered referral but patient will contact the office she is to go to first and let me know if she needs referral.  --Menopausal symptoms: Patient had labs drawn with [facility] a few days ago, advised to forward results to me.  Discussed that she should have screening mammogram completed first before starting hormone replacement therapy.    Return in 1 year (on 4/25/2026) for Annual exam.       Lilly Newell PA-C  4/25/2025 17:19 EDT

## 2025-04-29 LAB
CYTOLOGIST CVX/VAG CYTO: NORMAL
CYTOLOGY CVX/VAG DOC CYTO: NORMAL
CYTOLOGY CVX/VAG DOC THIN PREP: NORMAL
DX ICD CODE: NORMAL
HPV I/H RISK 4 DNA CVX QL PROBE+SIG AMP: NEGATIVE
OTHER STN SPEC: NORMAL
SERVICE CMNT-IMP: NORMAL
STAT OF ADQ CVX/VAG CYTO-IMP: NORMAL

## (undated) DEVICE — DISPOSABLE MONOPOLAR ENDOSCOPIC CORD 10 FT. (3M): Brand: KIRWAN

## (undated) DEVICE — SUT MNCRYL 4/0 SH 27IN Y415H

## (undated) DEVICE — OCCLUSIVE GAUZE STRIP,3% BISMUTH TRIBROMOPHENATE IN PETROLATUM BLEND: Brand: XEROFORM

## (undated) DEVICE — DRP Z/FRICTION 10X16IN

## (undated) DEVICE — INTENT TO BE USED WITH SUTURE MATERIAL FOR TISSUE CLOSURE: Brand: RICHARD-ALLAN® NEEDLE 1/2 CIRCLE TAPER

## (undated) DEVICE — INTENDED FOR TISSUE SEPARATION, AND OTHER PROCEDURES THAT REQUIRE A SHARP SURGICAL BLADE TO PUNCTURE OR CUT.: Brand: BARD-PARKER ® CARBON RIB-BACK BLADES

## (undated) DEVICE — TRY SKINPREP WET CHG 4PCT SPNG HIB

## (undated) DEVICE — COVER,MAYO STAND,STERILE: Brand: MEDLINE

## (undated) DEVICE — PK BASIC ORTHO 90

## (undated) DEVICE — DYONICS 5.5 FULL RADIUS BONECUTTER                                    BLADES, ORANGE, 8000 MAXIMUM RPM,                                    PACKAGED 6 PER BOX, STERILE

## (undated) DEVICE — PENCL E/S ULTRAVAC TELESCP NOSE HOLSTR 10FT

## (undated) DEVICE — 3M™ STERI-DRAPE™ INSTRUMENT POUCH 1018: Brand: STERI-DRAPE™

## (undated) DEVICE — IRRIGATOR BULB ASEPTO 60CC STRL

## (undated) DEVICE — DRSNG TELFA PAD NONADH STR 1S 3X8IN

## (undated) DEVICE — ADAPT DB SPIKE 2PCT FOR AR6400 TBG

## (undated) DEVICE — SPNG LAP 18X18IN LF STRL PK/5

## (undated) DEVICE — TRAP FLD MINIVAC MEGADYNE 100ML

## (undated) DEVICE — LAG ARTHROSCOPY: Brand: MEDLINE INDUSTRIES, INC.

## (undated) DEVICE — DRSNG PAD ABD 8X10IN STRL

## (undated) DEVICE — 3M™ IOBAN™ 2 ANTIMICROBIAL INCISE DRAPE 6650EZ: Brand: IOBAN™ 2

## (undated) DEVICE — SUT VIC 0 CT1 36IN J946H

## (undated) DEVICE — DRAINBAG,ANTI-REFLUX TOWER,L/F,2000ML,LL: Brand: MEDLINE

## (undated) DEVICE — LEGGINGS, PAIR, 29X43, STERILE: Brand: MEDLINE

## (undated) DEVICE — SOL ISO/ALC RUB 70PCT 4OZ

## (undated) DEVICE — GLV SURG SENSICARE ORTHO PF LF 7 STRL

## (undated) DEVICE — CATHETER,FOLEY,SILI-ELAST,LTX,12FR,10ML: Brand: MEDLINE

## (undated) DEVICE — MANIP UTER ADVINCULA DELINEATOR W/ 4CM ULTEM PLSTC CUP

## (undated) DEVICE — TOWEL,OR,DSP,ST,BLUE,STD,4/PK,20PK/CS: Brand: MEDLINE

## (undated) DEVICE — GUIDE WIRE 1.2 MM X 12 INCH. BOX                                    OF 5, STERILE: Brand: BIOSURE

## (undated) DEVICE — DRAPE,REIN 53X77,STERILE: Brand: MEDLINE

## (undated) DEVICE — SUT MNCRYL 3/0 PS2 27IN Y427H

## (undated) DEVICE — COVER,LIGHT HANDLE,FLX,2/PK: Brand: MEDLINE INDUSTRIES, INC.

## (undated) DEVICE — ENDOPATH PNEUMONEEDLE INSUFFLATION NEEDLES WITH LUER LOCK CONNECTORS 120MM: Brand: ENDOPATH

## (undated) DEVICE — LEGGINGS, PAIR, CLEAR, STERILE: Brand: MEDLINE

## (undated) DEVICE — 3M™ STERI-DRAPE™ INSTRUMENT POUCH 1018L: Brand: STERI-DRAPE™

## (undated) DEVICE — DEV COND GAS LAP INSUFLOW W/LUER CONN

## (undated) DEVICE — RUBBERBAND LF STRL PK/2

## (undated) DEVICE — SYR CONTRL LUERLOK 10CC

## (undated) DEVICE — GLV SURG BIOGEL LTX PF 6 1/2

## (undated) DEVICE — GLV SURG NEOLON 2G PF LF 7.5 STRL

## (undated) DEVICE — DEV SUT GRSPR CLOSUR 15CM 14G

## (undated) DEVICE — Device

## (undated) DEVICE — 3M™ STERI-STRIP™ COMPOUND BENZOIN TINCTURE 40 BAGS/CARTON 4 CARTONS/CASE C1544: Brand: 3M™ STERI-STRIP™

## (undated) DEVICE — TUBING, SUCTION, 1/4" X 12', STRAIGHT: Brand: MEDLINE

## (undated) DEVICE — CATHETER,FOLEY,100%SILICONE,16FR,10ML,LF: Brand: MEDLINE

## (undated) DEVICE — ST IRR CYSTO W/SPK 77IN LF

## (undated) DEVICE — TRANSPOSAL ULTRAFLEX DUO/QUAD ULTRA CART MANIFOLD

## (undated) DEVICE — ENDOPATH XCEL BLADELESS TROCARS WITH STABILITY SLEEVES: Brand: ENDOPATH XCEL

## (undated) DEVICE — SOL NACL 0.9PCT 1000ML

## (undated) DEVICE — 1010 S-DRAPE TOWEL DRAPE 10/BX: Brand: STERI-DRAPE™

## (undated) DEVICE — PUNCH BIOP 2MM

## (undated) DEVICE — SUT VIC 0 TIES 18IN J912G

## (undated) DEVICE — CLEAR-TRAC DISPOSABLE STOPCOCK: Brand: CLEAR-TRAC

## (undated) DEVICE — BNDG ELAS ELITE V/CLOSE 6IN 5YD LF STRL

## (undated) DEVICE — DRAPE,UNDERBUTTOCKS,PCH,STERILE: Brand: MEDLINE

## (undated) DEVICE — DRSNG SURESITE WNDW 4X4.5

## (undated) DEVICE — SYR LL 3CC

## (undated) DEVICE — DISPOSABLE TOURNIQUET CUFF SINGLE BLADDER, SINGLE PORT AND LUER LOCK CONNECTOR: Brand: COLOR CUFF

## (undated) DEVICE — TUBING, SUCTION, 1/4" X 20', STRAIGHT: Brand: MEDLINE INDUSTRIES, INC.

## (undated) DEVICE — SPNG GZ WOVN 4X4IN 12PLY 10/BX STRL

## (undated) DEVICE — DRAPE,SPLIT,CV,CLR ANES,STERILE: Brand: MEDLINE

## (undated) DEVICE — 2.4 MM X 15 INCH DRILL TIP PASSING                                    PIN, STERILE: Brand: ENDOBUTTON

## (undated) DEVICE — SYRINGE,EAR/ULCER, RED, 2 OZ, STERILE: Brand: MEDLINE

## (undated) DEVICE — COUNT NDL MAG FM/BLCK 40COUNT

## (undated) DEVICE — CONTN STRL 32OZ

## (undated) DEVICE — WEBRIL* CAST PADDING: Brand: DEROYAL

## (undated) DEVICE — ANTIBACTERIAL UNDYED BRAIDED (POLYGLACTIN 910), SYNTHETIC ABSORBABLE SUTURE: Brand: COATED VICRYL

## (undated) DEVICE — VAGINAL PACKING: Brand: DEROYAL

## (undated) DEVICE — 3M™ STERI-STRIP™ REINFORCED ADHESIVE SKIN CLOSURES, R1547, 1/2 IN X 4 IN (12 MM X 100 MM), 6 STRIPS/ENVELOPE: Brand: 3M™ STERI-STRIP™

## (undated) DEVICE — SUT MNCRYL 4/0 PS2 18 IN

## (undated) DEVICE — LOU LAVH: Brand: MEDLINE INDUSTRIES, INC.

## (undated) DEVICE — SUT VIC 2/0 CP2 CR8 18IN J762D

## (undated) DEVICE — NDL INJ CYSTO SIDEKICK SPNLTIP CONCV 21G 14.6

## (undated) DEVICE — TROCAR: Brand: KII OPTICAL ACCESS SYSTEM

## (undated) DEVICE — SYS SKIN CLS DERMABOND PRINEO W/22CM MESH TP

## (undated) DEVICE — HARMONIC ACE +7 LAPAROSCOPIC SHEARS ADVANCED HEMOSTASIS 5MM DIAMETER 36CM SHAFT LENGTH  FOR USE WITH GRAY HAND PIECE ONLY: Brand: HARMONIC ACE

## (undated) DEVICE — WEREWOLF FLOW 50 COBLATION WAND: Brand: COBLATION

## (undated) DEVICE — ST TB GOFLO STRL

## (undated) DEVICE — MAT FLR ABSORBENT LG 4FT 10 2.5FT

## (undated) DEVICE — ENDOPATH XCEL UNIVERSAL TROCAR STABLILITY SLEEVES: Brand: ENDOPATH XCEL

## (undated) DEVICE — UNDYED BRAIDED (POLYGLACTIN 910), SYNTHETIC ABSORBABLE SUTURE: Brand: COATED VICRYL

## (undated) DEVICE — SYRINGE, LUER LOCK, 60ML: Brand: MEDLINE

## (undated) DEVICE — ENDOSCOPIC CANNULATED DRILL BIT,                                    4.5 MM, STERILE

## (undated) DEVICE — FOAM WRAP, KNEE: Brand: DEROYAL

## (undated) DEVICE — SUT PROLN 1 CT1 30IN 8425H

## (undated) DEVICE — APPL CHLORAPREP W/TINT 26ML ORNG

## (undated) DEVICE — VIOLET POLYDIOXANONE POLYMER, SYNTHETIC ABSORBABLE SUTURE CLIPS: Brand: LAPRA-TY

## (undated) DEVICE — INTENDED FOR TISSUE SEPARATION, AND OTHER PROCEDURES THAT REQUIRE A SHARP SURGICAL BLADE TO PUNCTURE OR CUT.: Brand: BARD-PARKER ® STAINLESS STEEL BLADES

## (undated) DEVICE — GLV SURG SENSICARE W/ALOE PF LF 7.5 STRL

## (undated) DEVICE — BNDG ELAS ELITE V/CLOSE 4IN 5YD LF

## (undated) DEVICE — COVER,TABLE,HEAVY DUTY,79"X110",STRL: Brand: MEDLINE

## (undated) DEVICE — ADHS SKIN SURG TISS VISC PREMIERPRO EXOFIN HI/VISC FAST/DRY

## (undated) DEVICE — PAD UNDERCAST WYTEX 4IN 4YD LF STRL

## (undated) DEVICE — THIN OFFSET (9.0 X 0.38 X 25.0MM)

## (undated) DEVICE — 2.4MM X 10 INCH DRILL-TIP GUIDE WIRE: Brand: ENDOBUTTON

## (undated) DEVICE — LAPAROSCOPIC SMOKE ELIMINATION DEVICE: Brand: PNEUVIEW XE

## (undated) DEVICE — ACL GRAFT KNIFE 10MM

## (undated) DEVICE — SUT VIC 0/0 UR6 27IN DYED J603H

## (undated) DEVICE — NDL HYPO PRECISIONGLIDE REG 25G 1 1/2

## (undated) DEVICE — SINGLE BASIN: Brand: CARDINAL HEALTH

## (undated) DEVICE — SHEARS WITH ROTICULATOR TECHNOLOGY: Brand: ENDO MINI-SHEARS

## (undated) DEVICE — DRSNG WND BORDR/ADHS NONADHR/GZ LF 2X2IN STRL

## (undated) DEVICE — APPL CHLORAPREP HI/LITE 26ML ORNG